# Patient Record
Sex: FEMALE | Race: BLACK OR AFRICAN AMERICAN | NOT HISPANIC OR LATINO | Employment: OTHER | URBAN - METROPOLITAN AREA
[De-identification: names, ages, dates, MRNs, and addresses within clinical notes are randomized per-mention and may not be internally consistent; named-entity substitution may affect disease eponyms.]

---

## 2017-03-18 ENCOUNTER — HOSPITAL ENCOUNTER (EMERGENCY)
Facility: HOSPITAL | Age: 72
Discharge: HOME | End: 2017-03-18
Attending: EMERGENCY MEDICINE
Payer: MEDICARE

## 2017-03-18 VITALS
HEIGHT: 69 IN | WEIGHT: 210 LBS | TEMPERATURE: 99 F | HEART RATE: 76 BPM | RESPIRATION RATE: 18 BRPM | OXYGEN SATURATION: 100 % | DIASTOLIC BLOOD PRESSURE: 90 MMHG | BODY MASS INDEX: 31.1 KG/M2 | SYSTOLIC BLOOD PRESSURE: 200 MMHG

## 2017-03-18 DIAGNOSIS — I10 UNCONTROLLED HYPERTENSION: ICD-10-CM

## 2017-03-18 DIAGNOSIS — S20.211A CHEST WALL CONTUSION, RIGHT, INITIAL ENCOUNTER: ICD-10-CM
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
## 2017-03-18 LAB
ALBUMIN SERPL BCP-MCNC: 3.6 G/DL
ALP SERPL-CCNC: 105 U/L
ALT SERPL W/O P-5'-P-CCNC: 8 U/L
ANION GAP SERPL CALC-SCNC: 8 MMOL/L
AST SERPL-CCNC: 11 U/L
BASOPHILS # BLD AUTO: 0.01 K/UL
BASOPHILS NFR BLD: 0.1 %
BILIRUB SERPL-MCNC: 0.2 MG/DL
BNP SERPL-MCNC: 12 PG/ML
BUN SERPL-MCNC: 15 MG/DL
CALCIUM SERPL-MCNC: 9.5 MG/DL
CHLORIDE SERPL-SCNC: 102 MMOL/L
CO2 SERPL-SCNC: 29 MMOL/L
CREAT SERPL-MCNC: 1.1 MG/DL
DIFFERENTIAL METHOD: ABNORMAL
EOSINOPHIL # BLD AUTO: 0.1 K/UL
EOSINOPHIL NFR BLD: 0.8 %
ERYTHROCYTE [DISTWIDTH] IN BLOOD BY AUTOMATED COUNT: 15.4 %
EST. GFR  (AFRICAN AMERICAN): 59 ML/MIN/1.73 M^2
EST. GFR  (NON AFRICAN AMERICAN): 51 ML/MIN/1.73 M^2
GLUCOSE SERPL-MCNC: 329 MG/DL
HCT VFR BLD AUTO: 34.6 %
HGB BLD-MCNC: 10.7 G/DL
INR PPP: 1
LYMPHOCYTES # BLD AUTO: 1.7 K/UL
LYMPHOCYTES NFR BLD: 20 %
MCH RBC QN AUTO: 23.2 PG
MCHC RBC AUTO-ENTMCNC: 30.9 %
MCV RBC AUTO: 75 FL
MONOCYTES # BLD AUTO: 0.4 K/UL
MONOCYTES NFR BLD: 4.3 %
NEUTROPHILS # BLD AUTO: 6.3 K/UL
NEUTROPHILS NFR BLD: 74.8 %
PLATELET # BLD AUTO: 256 K/UL
PMV BLD AUTO: 10.3 FL
POTASSIUM SERPL-SCNC: 4 MMOL/L
PROT SERPL-MCNC: 7.7 G/DL
PROTHROMBIN TIME: 10.5 SEC
RBC # BLD AUTO: 4.61 M/UL
SODIUM SERPL-SCNC: 139 MMOL/L
TROPONIN I SERPL DL<=0.01 NG/ML-MCNC: <0.006 NG/ML
WBC # BLD AUTO: 8.47 K/UL

## 2017-03-18 PROCEDURE — 99284 EMERGENCY DEPT VISIT MOD MDM: CPT

## 2017-03-18 PROCEDURE — 80053 COMPREHEN METABOLIC PANEL: CPT

## 2017-03-18 PROCEDURE — 84484 ASSAY OF TROPONIN QUANT: CPT

## 2017-03-18 PROCEDURE — 25000003 PHARM REV CODE 250: Performed by: EMERGENCY MEDICINE

## 2017-03-18 PROCEDURE — 85025 COMPLETE CBC W/AUTO DIFF WBC: CPT

## 2017-03-18 PROCEDURE — 83880 ASSAY OF NATRIURETIC PEPTIDE: CPT

## 2017-03-18 PROCEDURE — 93005 ELECTROCARDIOGRAM TRACING: CPT

## 2017-03-18 PROCEDURE — 85610 PROTHROMBIN TIME: CPT

## 2017-03-18 RX ORDER — TRAMADOL HYDROCHLORIDE 50 MG/1
50 TABLET ORAL
Status: COMPLETED | OUTPATIENT
Start: 2017-03-18 | End: 2017-03-18

## 2017-03-18 RX ORDER — LOVASTATIN 20 MG/1
20 TABLET ORAL NIGHTLY
Qty: 30 TABLET | Refills: 2 | Status: SHIPPED | OUTPATIENT
Start: 2017-03-18 | End: 2019-09-10 | Stop reason: CLARIF

## 2017-03-18 RX ORDER — LISINOPRIL 10 MG/1
10 TABLET ORAL DAILY
Qty: 30 TABLET | Refills: 2 | Status: SHIPPED | OUTPATIENT
Start: 2017-03-18 | End: 2019-09-10 | Stop reason: DRUGHIGH

## 2017-03-18 RX ORDER — LISINOPRIL 5 MG/1
10 TABLET ORAL
Status: COMPLETED | OUTPATIENT
Start: 2017-03-18 | End: 2017-03-18

## 2017-03-18 RX ORDER — TRAMADOL HYDROCHLORIDE 50 MG/1
50 TABLET ORAL EVERY 8 HOURS PRN
Qty: 20 TABLET | Refills: 0 | Status: SHIPPED | OUTPATIENT
Start: 2017-03-18 | End: 2017-03-21

## 2017-03-18 RX ORDER — ASPIRIN 81 MG/1
81 TABLET ORAL DAILY
Qty: 90 TABLET | Refills: 3 | Status: SHIPPED | OUTPATIENT
Start: 2017-03-18

## 2017-03-18 RX ORDER — CLONIDINE HYDROCHLORIDE 0.1 MG/1
0.3 TABLET ORAL
Status: COMPLETED | OUTPATIENT
Start: 2017-03-18 | End: 2017-03-18

## 2017-03-18 RX ORDER — GLIPIZIDE 5 MG/1
5 TABLET, FILM COATED, EXTENDED RELEASE ORAL
Qty: 30 TABLET | Refills: 2 | Status: SHIPPED | OUTPATIENT
Start: 2017-03-18 | End: 2017-03-21

## 2017-03-18 RX ADMIN — TRAMADOL HYDROCHLORIDE 50 MG: 50 TABLET, FILM COATED ORAL at 11:03

## 2017-03-18 RX ADMIN — CLONIDINE HYDROCHLORIDE 0.3 MG: 0.1 TABLET ORAL at 10:03

## 2017-03-18 RX ADMIN — LISINOPRIL 10 MG: 5 TABLET ORAL at 10:03

## 2017-03-18 NOTE — ED AVS SNAPSHOT
[facility]  [address] [place] [address]               Mariangel Moreau   3/18/2017  9:55 PM   ED    Description:  Female : 1945   Department:  [facility]           Your Care was Coordinated By:     Provider Role From To    Judson Nelson MD Attending Provider 17 [phone] --      Reason for Visit     Chest Pain           Diagnoses this Visit        Comments    Fall, initial encounter    -  Primary     Chest wall contusion, right, initial encounter         Uncontrolled hypertension           ED Disposition     ED Disposition Condition Comment    Discharge             To Do List           Follow-up Information     Schedule an appointment as soon as possible for a visit with Cassandra Raman NP.    Specialty:  Family Medicine    Contact information:    [address] [facility] [address]  [phone]         These Medications        Disp Refills Start End    aspirin (ECOTRIN) 81 MG EC tablet 90 tablet 3 3/18/2017     Take 1 tablet (81 mg total) by mouth once daily. - Oral    glipiZIDE (GLUCOTROL) 5 MG TR24 30 tablet 2 3/18/2017     Take 1 tablet (5 mg total) by mouth daily with breakfast. - Oral    lisinopril 10 MG tablet 30 tablet 2 3/18/2017     Take 1 tablet (10 mg total) by mouth once daily. - Oral    lovastatin (MEVACOR) 20 MG tablet 30 tablet 2 3/18/2017     Take 1 tablet (20 mg total) by mouth every evening. - Oral    tramadol (ULTRAM) 50 mg tablet 20 tablet 0 3/18/2017 3/21/2017    Take 1 tablet (50 mg total) by mouth every 8 (eight) hours as needed for Pain. - Oral      [facility]     [facility] Care [facility] -  Assistance  Registered nurses in the [facility] provide clinical advisement, health education, appointment booking, and other advisory services.  Call for this free service at [phone].             Medications           Message regarding Medications      Verify the changes and/or additions to your medication regime listed below are the same as discussed with your clinician today.  If any of these changes or additions are incorrect, please notify your healthcare provider.        START taking these NEW medications        Refills    tramadol (ULTRAM) 50 mg tablet 0    Sig: Take 1 tablet (50 mg total) by mouth every 8 (eight) hours as needed for Pain.    Class: Print    Route: Oral      These medications were administered today        Dose Freq    cloNIDine tablet 0.3 mg 0.3 mg ED 1 Time    Sig: Take 3 tablets (0.3 mg total) by mouth ED 1 Time.    Class: Normal    Route: Oral    lisinopril tablet 10 mg 10 mg ED 1 Time    Sig: Take 2 tablets (10 mg total) by mouth ED 1 Time.    Class: Normal    Route: Oral    tramadol tablet 50 mg 50 mg ED 1 Time    Sig: Take 1 tablet (50 mg total) by mouth ED 1 Time.    Class: Normal    Route: Oral      CHANGE how you are taking these medications     Start Taking Instead of    aspirin (ECOTRIN) 81 MG EC tablet aspirin (ECOTRIN) 81 MG EC tablet    Dosage:  Take 1 tablet (81 mg total) by mouth once daily. Dosage:  Take 81 mg by mouth once daily.    glipiZIDE (GLUCOTROL) 5 MG TR24 glipiZIDE (GLUCOTROL) 10 MG TR24    Dosage:  Take 1 tablet (5 mg total) by mouth daily with breakfast. Dosage:  Take 5 mg by mouth daily with breakfast.    lisinopril 10 MG tablet lisinopril 10 MG tablet    Dosage:  Take 1 tablet (10 mg total) by mouth once daily. Dosage:  Take 10 mg by mouth once daily.    lovastatin (MEVACOR) 20 MG tablet lovastatin (MEVACOR) 20 MG tablet    Dosage:  Take 1 tablet (20 mg total) by mouth every evening. Dosage:  Take 20 mg by mouth every evening.      STOP taking these medications     ondansetron (ZOFRAN-ODT) 8 MG TbDL Take 1 tablet (8 mg total) by mouth every 8 (eight) hours as needed (nausea).    omeprazole (PRILOSEC) 20 MG capsule Take 2 capsules (40 mg total) by mouth once daily.           Verify that the below list of  "medications is an accurate representation of the medications you are currently taking.  If none reported, the list may be blank. If incorrect, please contact your healthcare provider. Carry this list with you in case of emergency.           Current Medications     aspirin (ECOTRIN) 81 MG EC tablet Take 1 tablet (81 mg total) by mouth once daily.    calcium-vitamin D3 500 mg(1,250mg) -200 unit per tablet Take 1 tablet by mouth 2 (two) times daily with meals.    glipiZIDE (GLUCOTROL) 5 MG TR24 Take 1 tablet (5 mg total) by mouth daily with breakfast.    lisinopril 10 MG tablet Take 1 tablet (10 mg total) by mouth once daily.    lisinopril tablet 10 mg Take 2 tablets (10 mg total) by mouth ED 1 Time.    lovastatin (MEVACOR) 20 MG tablet Take 1 tablet (20 mg total) by mouth every evening.    multivitamin capsule Take 1 capsule by mouth once daily.    tramadol (ULTRAM) 50 mg tablet Take 1 tablet (50 mg total) by mouth every 8 (eight) hours as needed for Pain.    tramadol tablet 50 mg Take 1 tablet (50 mg total) by mouth ED 1 Time.           Clinical Reference Information           Your Vitals Were     BP Pulse Temp Resp Height Weight    209/94 74 99.4 °F (37.4 °C) (Oral) 20 5' 9" (1.753 m) 95.3 kg (210 lb)    SpO2 BMI             99% 31.01 kg/m2         Allergies as of 3/18/2017        Reactions    Darvocet A500 [Propoxyphene N-acetaminophen] Swelling    Penicillins Swelling    Percocet [Oxycodone-acetaminophen]       Immunizations Administered on Date of Encounter - 3/18/2017     None      ED Micro, Lab, POCT     Start Ordered       Status Ordering Provider    03/18/17 2026 03/18/17 2025  CBC auto differential  Once      Final result     03/18/17 2026 03/18/17 2025  Comprehensive metabolic panel  Once      Final result     03/18/17 2026 03/18/17 2025  Brain natriuretic peptide  Once      Final result     03/18/17 2026 03/18/17 2025  Troponin I  Once      Final result     03/18/17 2026 03/18/17 2025  Protime-INR  Once   "    Final result       ED Imaging Orders     Start Ordered       Status Ordering Provider    03/18/17 2026 03/18/17 2025  X-Ray Chest PA And Lateral  1 time imaging      Final result       Discharge References/Attachments     CHEST WALL CONTUSION (ENGLISH)    FALLS, PREVENTING, MAKING CHANGES IN YOUR LIVING SPACE (ENGLISH)    HIGH BLOOD PRESSURE, ESTABLISHED, OUT OF CONTROL (ENGLISH)      [facility] Sign-Up     Activating your [facility] account is as easy as 1-2-3!     1) Visit [email or web address], select Sign Up Now, enter this activation code and your date of birth, then select Next.  [email or web address]  Expires: 5/2/2017 10:49 PM      2) Create a username and password to use when you visit [facility] in the future and select a security question in case you lose your password and select Next.    3) Enter your e-mail address and click Sign Up!    Additional Information  If you have questions, please e-mail [email or web address] or call [phone] to talk to our [facility] staff. Remember, [facility] is NOT to be used for urgent needs. For medical emergencies, dial 911.          [facility] complies with applicable Federal civil rights laws and does not discriminate on the basis of race, color, national origin, age, disability, or sex.        Language Assistance Services     ATTENTION: Language assistance services are available, free of charge. Please call [phone].      ATENCIÓN: Si habla español, tiene a restrepo disposición servicios gratuitos de asistencia lingüística. Llame al [phone].     CHÚ Ý: N?u b?n nói Ti?ng Vi?t, có các d?ch v? h? tr? ngôn ng? mi?n phí dành cho b?n. G?i s? [phone].

## 2017-03-19 NOTE — ED PROVIDER NOTES
"Encounter Date: 3/18/2017    SCRIBE #1 NOTE: I, Sybil Bruce, am scribing for, and in the presence of,  Judson Nelson MD. I have scribed the following portions of the note - Other sections scribed: HPI, ROS.       History     Chief Complaint   Patient presents with    Chest Pain     "I've been having cp for 3 days now and I have tingling in my right shoulder and I can feel it in my wrist also. Right-sided cp     Review of patient's allergies indicates:   Allergen Reactions    Darvocet a500 [propoxyphene n-acetaminophen] Swelling    Penicillins Swelling    Percocet [oxycodone-acetaminophen]      HPI Comments: CC: Chest  Pain    HPI: 71 y.o. F with HTN and DM presents to the ED c/o R chest wall pain and R arm and shoulder pain s/p fall 2 days ago. Symptoms are acute onset and severe (8/10). Pt states symptoms began after slip and fall in the shower landing on her R side. No LOC. No head trauma. Pt denies fever, chills, neck pain, vision changes, abdominal pain, N/V, hematuria, flank pain, dizziness, and any other associated symptoms. No alleviating factors. No prior treatment. Of note, pt states she is noncompliant with her daily medications and states she forgets to take them.    The history is provided by the patient. No  was used.     Past Medical History:   Diagnosis Date    Diabetes mellitus     Hypertension     Sarcoidosis      Past Surgical History:   Procedure Laterality Date    CHOLECYSTECTOMY       History reviewed. No pertinent family history.  Social History   Substance Use Topics    Smoking status: Never Smoker    Smokeless tobacco: None    Alcohol use No     Review of Systems   Constitutional: Negative for chills, diaphoresis and fever.   HENT: Negative for ear pain and sore throat.    Eyes: Negative for photophobia and visual disturbance.   Respiratory: Negative for cough and shortness of breath.    Cardiovascular: Positive for chest pain (R chest wall). "   Gastrointestinal: Negative for abdominal pain, diarrhea, nausea and vomiting.   Genitourinary: Negative for dysuria, flank pain and hematuria.   Musculoskeletal: Negative for back pain and neck pain.        (+) R arm and shoulder pain   Skin: Negative for rash.   Neurological: Negative for dizziness and headaches.        (-) LOC       Physical Exam   Initial Vitals   BP Pulse Resp Temp SpO2   03/18/17 2024 03/18/17 2024 03/18/17 2024 03/18/17 2024 03/18/17 2024   224/93 73 20 99.4 °F (37.4 °C) 99 %     Physical Exam  Nursing note and vitals reviewed.  Constitutional:   HENT:    Head: NC/AT    Eyes: Conjunctivae normal.   (-) scleral icterus.              Mouth/Throat: MMM.     Neck: Neck supple, normal rom.   Cardiovascular: RRR  Pulmonary/Chest: CTAB   Abdominal: Soft. ND/NT w/o guarding or rebound.  (-) CVA tenderness.  Musculoskeletal: FROM of all major joints. No extremity edema or tenderness.  Neurological: A&Ox4, Normal speech.  No focal motor deficits.  Normal gait  Skin: Skin is warm and dry.   Psychiatric: normal mood and affect.      ED Course   Procedures  Labs Reviewed   CBC W/ AUTO DIFFERENTIAL - Abnormal; Notable for the following:        Result Value    Hemoglobin 10.7 (*)     Hematocrit 34.6 (*)     MCV 75 (*)     MCH 23.2 (*)     MCHC 30.9 (*)     RDW 15.4 (*)     Gran% 74.8 (*)     All other components within normal limits   COMPREHENSIVE METABOLIC PANEL - Abnormal; Notable for the following:     Glucose 329 (*)     ALT 8 (*)     eGFR if  59 (*)     eGFR if non  51 (*)     All other components within normal limits   B-TYPE NATRIURETIC PEPTIDE   TROPONIN I   PROTIME-INR     EKG Readings: (Independently Interpreted)   Initial Reading: No STEMI.   Normal sinus rhythm, rate 77, normal axis/intervals, no ST/T-wave abnormalities.       X-Rays:   Independently Interpreted Readings:   Chest X-Ray: Normal heart size.  No infiltrates.  No acute abnormalities.  No acute  displaced rib fracture, pneumothorax or hemothorax.     Medical Decision Making:   History:   I obtained history from: someone other than patient.  Old Medical Records: I decided to obtain old medical records.  Old Records Summarized: records from clinic visits and other records.  Independently Interpreted Test(s):   I have ordered and independently interpreted X-rays - see prior notes.  I have ordered and independently interpreted EKG Reading(s) - see prior notes  Clinical Tests:   Lab Tests: Ordered and Reviewed  Radiological Study: Ordered and Reviewed  Medical Tests: Ordered and Reviewed    Initial differential diagnosis includes but is not limited to... ACS, pneumothorax, PE, pneumonia, aortic dissection, chest wall/rib contusion versus fracture.     Additional Medical Decision Makin-year-old female with history of hypertension and diabetes presents the emergency department by her  complaining of right shoulder/arm/right sided chest pain for the past 2 days status post slip and fall in the shower - see hpi for additional details.  On exam, she has reproducible nonspecific right anterior chest ttp; Lugs CTA.  Aside from HTN, VS reassuring.  Basic labs including troponin and BNP wnls and without evidence of end organ damage.  Her EKG is without evidence of acute ischemia and/or dysrhythmia.  She's been given her home antihypertensives including clonidine prior to being discharged home.  Recommend supportive care including Ultram as needed.  All medications have been refilled per patient request.  She has been strongly advised to follow-up with her primary care physician within 5-7 days for reevaluation further management including repeat blood pressure check.            Scribe Attestation:   Scribe #1: I performed the above scribed service and the documentation accurately describes the services I performed. I attest to the accuracy of the note.    Attending Attestation:           Physician  Attestation for Scribe:  Physician Attestation Statement for Scribe #1: I, Judson Nelson MD, reviewed documentation, as scribed by Sybil Bruce in my presence, and it is both accurate and complete.                 ED Course     Clinical Impression:   The primary encounter diagnosis was Fall, initial encounter. Diagnoses of Chest wall contusion, right, initial encounter and Uncontrolled hypertension were also pertinent to this visit.    Disposition:   Disposition: Discharged       Judson Nelson MD  03/18/17 0363

## 2017-03-19 NOTE — ED TRIAGE NOTES
"Pt presents to ED with c/o right-sided cp. Pt states, "I've been having cp for 3 days now and I have tingling in my right shoulder and I can feel it in my wrist also." Rates pain 10/10.   Past Medical History:   Diagnosis Date    Diabetes mellitus     Hypertension     Sarcoidosis      Past Surgical History:   Procedure Laterality Date    CHOLECYSTECTOMY        Vitals:    03/18/17 2024   BP: (!) 224/93   BP Location: Right arm   Patient Position: Sitting   Pulse: 73   Resp: 20   Temp: 99.4 °F (37.4 °C)   TempSrc: Oral   SpO2: 99%   Weight: 95.3 kg (210 lb)   Height: 5' 9" (1.753 m)     "

## 2017-03-21 ENCOUNTER — HOSPITAL ENCOUNTER (EMERGENCY)
Facility: HOSPITAL | Age: 72
Discharge: HOME | End: 2017-03-21
Attending: EMERGENCY MEDICINE
Payer: MEDICARE

## 2017-03-21 VITALS
WEIGHT: 210 LBS | DIASTOLIC BLOOD PRESSURE: 81 MMHG | HEART RATE: 80 BPM | HEIGHT: 69 IN | TEMPERATURE: 98 F | SYSTOLIC BLOOD PRESSURE: 198 MMHG | OXYGEN SATURATION: 96 % | RESPIRATION RATE: 18 BRPM | BODY MASS INDEX: 31.1 KG/M2

## 2017-03-21 DIAGNOSIS — R07.9 CHEST PAIN, UNSPECIFIED TYPE: Primary | ICD-10-CM

## 2017-03-21 LAB
ALBUMIN SERPL BCP-MCNC: 3.4 G/DL
ALP SERPL-CCNC: 101 U/L
ALT SERPL W/O P-5'-P-CCNC: 10 U/L
ANION GAP SERPL CALC-SCNC: 9 MMOL/L
AST SERPL-CCNC: 12 U/L
BASOPHILS # BLD AUTO: 0.01 K/UL
BASOPHILS NFR BLD: 0.1 %
BILIRUB SERPL-MCNC: 0.4 MG/DL
BNP SERPL-MCNC: 21 PG/ML
BUN SERPL-MCNC: 14 MG/DL
CALCIUM SERPL-MCNC: 9.7 MG/DL
CHLORIDE SERPL-SCNC: 95 MMOL/L
CO2 SERPL-SCNC: 31 MMOL/L
CREAT SERPL-MCNC: 1 MG/DL
DIFFERENTIAL METHOD: ABNORMAL
EOSINOPHIL # BLD AUTO: 0 K/UL
EOSINOPHIL NFR BLD: 0.2 %
ERYTHROCYTE [DISTWIDTH] IN BLOOD BY AUTOMATED COUNT: 15.3 %
EST. GFR  (AFRICAN AMERICAN): >60 ML/MIN/1.73 M^2
EST. GFR  (NON AFRICAN AMERICAN): 58 ML/MIN/1.73 M^2
GLUCOSE SERPL-MCNC: 341 MG/DL
HCT VFR BLD AUTO: 34 %
HGB BLD-MCNC: 10.7 G/DL
INR PPP: 1
LYMPHOCYTES # BLD AUTO: 1.2 K/UL
LYMPHOCYTES NFR BLD: 13.3 %
MCH RBC QN AUTO: 22.6 PG
MCHC RBC AUTO-ENTMCNC: 31.5 %
MCV RBC AUTO: 72 FL
MONOCYTES # BLD AUTO: 0.8 K/UL
MONOCYTES NFR BLD: 8.5 %
NEUTROPHILS # BLD AUTO: 7 K/UL
NEUTROPHILS NFR BLD: 77.7 %
PLATELET # BLD AUTO: 269 K/UL
PMV BLD AUTO: 10.6 FL
POTASSIUM SERPL-SCNC: 4.1 MMOL/L
PROT SERPL-MCNC: 8.1 G/DL
PROTHROMBIN TIME: 10.9 SEC
RBC # BLD AUTO: 4.74 M/UL
SODIUM SERPL-SCNC: 135 MMOL/L
TROPONIN I SERPL DL<=0.01 NG/ML-MCNC: <0.006 NG/ML
WBC # BLD AUTO: 8.96 K/UL

## 2017-03-21 PROCEDURE — 99284 EMERGENCY DEPT VISIT MOD MDM: CPT | Mod: 25

## 2017-03-21 PROCEDURE — 85025 COMPLETE CBC W/AUTO DIFF WBC: CPT

## 2017-03-21 PROCEDURE — 25500020 PHARM REV CODE 255: Performed by: EMERGENCY MEDICINE

## 2017-03-21 PROCEDURE — 96360 HYDRATION IV INFUSION INIT: CPT

## 2017-03-21 PROCEDURE — 80053 COMPREHEN METABOLIC PANEL: CPT

## 2017-03-21 PROCEDURE — 83880 ASSAY OF NATRIURETIC PEPTIDE: CPT

## 2017-03-21 PROCEDURE — 25000003 PHARM REV CODE 250: Performed by: EMERGENCY MEDICINE

## 2017-03-21 PROCEDURE — 85610 PROTHROMBIN TIME: CPT

## 2017-03-21 PROCEDURE — 93005 ELECTROCARDIOGRAM TRACING: CPT

## 2017-03-21 PROCEDURE — 84484 ASSAY OF TROPONIN QUANT: CPT

## 2017-03-21 RX ORDER — GLIPIZIDE 10 MG/1
10 TABLET ORAL
COMMUNITY
End: 2019-09-10 | Stop reason: SDUPTHER

## 2017-03-21 RX ORDER — SODIUM CHLORIDE 9 MG/ML
500 INJECTION, SOLUTION INTRAVENOUS
Status: COMPLETED | OUTPATIENT
Start: 2017-03-21 | End: 2017-03-21

## 2017-03-21 RX ORDER — METAXALONE 800 MG/1
400 TABLET ORAL 3 TIMES DAILY PRN
Qty: 15 TABLET | Refills: 0 | Status: SHIPPED | OUTPATIENT
Start: 2017-03-21 | End: 2017-03-26

## 2017-03-21 RX ORDER — NITROGLYCERIN 0.4 MG/1
0.4 TABLET SUBLINGUAL EVERY 5 MIN PRN
Status: COMPLETED | OUTPATIENT
Start: 2017-03-21 | End: 2017-03-21

## 2017-03-21 RX ORDER — METAXALONE 800 MG/1
800 TABLET ORAL
Status: COMPLETED | OUTPATIENT
Start: 2017-03-21 | End: 2017-03-21

## 2017-03-21 RX ADMIN — METAXALONE 800 MG: 800 TABLET ORAL at 10:03

## 2017-03-21 RX ADMIN — IOHEXOL 80 ML: 350 INJECTION, SOLUTION INTRAVENOUS at 09:03

## 2017-03-21 RX ADMIN — NITROGLYCERIN 0.4 MG: 0.4 TABLET SUBLINGUAL at 09:03

## 2017-03-21 RX ADMIN — NITROGLYCERIN 0.4 MG: 0.4 TABLET SUBLINGUAL at 08:03

## 2017-03-21 RX ADMIN — SODIUM CHLORIDE 500 ML: 0.9 INJECTION, SOLUTION INTRAVENOUS at 10:03

## 2017-03-21 NOTE — ED AVS SNAPSHOT
[facility]  [address] [place] [address]               Mariangel Moreau   3/21/2017  7:49 PM   ED    Description:  Female : 1945   Department:  [facility]           Your Care was Coordinated By:     Provider Role From To    Chepe Colon MD Attending Provider 17 --      Reason for Visit     Chest Pain           Diagnoses this Visit        Comments    Chest pain, unspecified type    -  Primary       ED Disposition     ED Disposition Condition Comment    Discharge  Our goal in the emergency department is to always give you outstanding care and exceptional service. You may receive a survey by mail or e-mail in the next week regarding your experience in our ED. We would greatly appreciate your completing and returnin g the survey. Your feedback provides us with a way to recognize our staff who give very good care and it helps us learn how to improve when your experience was below our aspiration of excellence.              To Do List           Follow-up Information     Follow up with Cassandra Raman NP.    Specialty:  Family Medicine    Why:  For repeat evaluation    Contact information:    [address] [facility] [id number] [phone]          Follow up with [facility].    Specialty:  Emergency Medicine    Why:  If symptoms worsen    Contact information:    [address] [place] [address] [phone]        Follow up with Gustavo Camp MD.    Specialty:  Cardiology    Why:  FOR SPECIALTY EVALUATION    Contact information:    [address] [facility] [address] [phone]         These Medications        Disp Refills Start End    metaxalone (SKELAXIN) 800 MG tablet 15 tablet 0 3/21/2017 3/26/2017    Take 0.5 tablets (400 mg total) by mouth 3 (three) times daily as needed for Pain. - Oral      [facility]     [facility] Care [facility] -  24/7 Assistance  Registered nurses in the [facility] provide clinical advisement, health education, appointment booking, and other advisory services.  Call for this free service at [phone].             Medications           Message regarding Medications     Verify the changes and/or additions to your medication regime listed below are the same as discussed with your clinician today.  If any of these changes or additions are incorrect, please notify your healthcare provider.        START taking these NEW medications        Refills    metaxalone (SKELAXIN) 800 MG tablet 0    Sig: Take 0.5 tablets (400 mg total) by mouth 3 (three) times daily as needed for Pain.    Class: Print    Route: Oral      These medications were administered today        Dose Freq    nitroGLYCERIN SL tablet 0.4 mg 0.4 mg Every 5 min PRN    Sig: Place 1 tablet (0.4 mg total) under the tongue every 5 (five) minutes as needed for Chest pain.    Class: Normal    Route: Sublingual    omnipaque 350 iohexol 80 mL 80 mL IMG once as needed    Sig: Inject 80 mLs into the vein ONCE PRN for contrast.    Class: Normal    Route: Intravenous    metaxalone tablet 800 mg 800 mg ED 1 Time    Sig: Take 1 tablet (800 mg total) by mouth ED 1 Time.    Class: Normal    Route: Oral    0.9%  NaCl infusion 500 mL ED 1 Time    Sig: Inject 500 mLs into the vein ED 1 Time.    Class: Normal    Route: Intravenous      STOP taking these medications     glipiZIDE (GLUCOTROL) 5 MG TR24 Take 1 tablet (5 mg total) by mouth daily with breakfast.           Verify that the below list of medications is an accurate representation of the medications you are currently taking.  If none reported, the list may be blank. If incorrect, please contact your healthcare provider. Carry this list with you in case of emergency.           Current Medications     aspirin (ECOTRIN) 81 MG EC tablet Take 1 tablet (81 mg total) by mouth once daily.    glipiZIDE (GLUCOTROL) 10 MG  "tablet Take 10 mg by mouth 2 (two) times daily before meals.    lisinopril 10 MG tablet Take 1 tablet (10 mg total) by mouth once daily.    lovastatin (MEVACOR) 20 MG tablet Take 1 tablet (20 mg total) by mouth every evening.    tramadol (ULTRAM) 50 mg tablet Take 1 tablet (50 mg total) by mouth every 8 (eight) hours as needed for Pain.    calcium-vitamin D3 500 mg(1,250mg) -200 unit per tablet Take 1 tablet by mouth 2 (two) times daily with meals.    metaxalone (SKELAXIN) 800 MG tablet Take 0.5 tablets (400 mg total) by mouth 3 (three) times daily as needed for Pain.    multivitamin capsule Take 1 capsule by mouth once daily.           Clinical Reference Information           Your Vitals Were     BP Pulse Temp Resp Height Weight    150/65 (BP Location: Left arm, Patient Position: Sitting, BP Method: Automatic) 96 98.3 °F (36.8 °C) (Oral) 18 5' 9" (1.753 m) 95.3 kg (210 lb)    SpO2 BMI             97% 31.01 kg/m2         Allergies as of 3/21/2017        Reactions    Darvocet A500 [Propoxyphene N-acetaminophen] Swelling    Penicillins Swelling    Percocet [Oxycodone-acetaminophen]       Immunizations Administered on Date of Encounter - 3/21/2017     None      ED Micro, Lab, POCT     Start Ordered       Status Ordering Provider    03/21/17 1949 03/21/17 1948  CBC auto differential  Once      Final result     03/21/17 1949 03/21/17 1948  Comprehensive metabolic panel  Once      Final result     03/21/17 1949 03/21/17 1948  Brain natriuretic peptide  Once      Final result     03/21/17 1949 03/21/17 1948  Troponin I  Once      Final result     03/21/17 1949 03/21/17 1948  Protime-INR  Once      Final result       ED Imaging Orders     Start Ordered       Status Ordering Provider    03/21/17 2023 03/21/17 2023  CTA Chest Non-Coronary  1 time imaging      Final result     03/21/17 1949 03/21/17 1948  X-Ray Chest PA And Lateral  1 time imaging      Final result         Discharge Instructions           Uncertain Causes of " Chest Pain    Chest pain can happen for a number of reasons. Sometimes the cause can't be determined. If your condition does not seem serious, and your pain does not appear to be coming from your heart, your healthcare provider may recommend watching it closely. Sometimes the signs of a serious problem take more time to appear. Many problems not related to your heart can cause chest pain.These include:  · Musculoskeletal. Costochondritis, an inflammation of the tissues around the ribs that can occur from trauma or overuse injuries  · Respiratory. Pneumonia, pneumothorax, or pneumonitis (inflammation of the lining of the chest and lungs)  · Gastrointestinal. Esophageal reflux, heartburn, or gallbladder disease  · Anxiety and panic disorders  · Nerve compression and neuritis  · Miscellaneous problems such as aortic aneurysm or pulmonary embolism (a blood clot in the lungs)  Home care  After your visit, follow these recommendations:  · Rest today and avoid strenuous activity.  · Take any prescribed medicine as directed.  · Be aware of any recurrent chest pain and notice any changes  Follow-up care  Follow up with your healthcare provider if you do not start to feel better within 24 hours, or as advised.  Call 911  Call 911 if any of these occur:  · A change in the type of pain: if it feels different, becomes more severe, lasts longer, or begins to spread into your shoulder, arm, neck, jaw or back  · Shortness of breath or increased pain with breathing  · Weakness, dizziness, or fainting  · Rapid heart beat  · Crushing sensation in your chest  When to seek medical advice  Call your healthcare provider right away if any of the following occur:  · Cough with dark colored sputum (phlegm) or blood  · Fever of 100.4ºF (38ºC) or higher, or as directed by your healthcare provider  · Swelling, pain or redness in one leg  · Shortness of breath  Date Last Reviewed: 12/30/2015  © 3309-5065 [employer]. [address] [place] [address]  [place] [address]. All rights reserved. This information is not intended as a substitute for professional medical care. Always follow your healthcare professional's instructions.                [facility] Sign-Up     Activating your [facility] account is as easy as 1-2-3!     1) Visit [email or web address], select Sign Up Now, enter this activation code and your date of birth, then select Next.  [email or web address]  Expires: 5/2/2017 10:49 PM      2) Create a username and password to use when you visit [facility] in the future and select a security question in case you lose your password and select Next.    3) Enter your e-mail address and click Sign Up!    Additional Information  If you have questions, please e-mail [email or web address] or call [phone] to talk to our [facility] staff. Remember, [facility] is NOT to be used for urgent needs. For medical emergencies, dial 911.          [facility] complies with applicable Federal civil rights laws and does not discriminate on the basis of race, color, national origin, age, disability, or sex.        Language Assistance Services     ATTENTION: Language assistance services are available, free of charge. Please call [phone].      ATENCIÓN: Si habla español, tiene a restrepo disposición servicios gratuitos de asistencia lingüística. Llame al [phone].     CHÚ Ý: N?u b?n nói Ti?ng Vi?t, có các d?ch v? h? tr? ngôn ng? mi?n phí dành cho b?n. G?i s? [phone].

## 2017-03-22 NOTE — ED NOTES
Pt informed that she needs to speak with her primary doctor for glipizide prescription. Pt verbalized understanding.

## 2017-03-22 NOTE — ED NOTES
Pt states she needs prescription refill for glipizide. Pt states she was wrote a script for 5mg when she takes 10mg twice a day.

## 2017-03-22 NOTE — ED TRIAGE NOTES
Pt presents to ER due to chest pain which began last Saturday. Pt states she was seen in the ER and was told she had a contusion. Pt states that she has been taking tramadol and aleve with no relief. Pt states that the pain is intermittent and radiates midsternal to her back.

## 2017-03-22 NOTE — DISCHARGE INSTRUCTIONS

## 2017-03-22 NOTE — ED NOTES
"  Pt states that she has side effects from flexeril states "it makes me very loopy.. I can't handle it."  "

## 2017-03-22 NOTE — ED PROVIDER NOTES
Encounter Date: 3/21/2017    SCRIBE #1 NOTE: I, Seth Martino, am scribing for, and in the presence of,  Chepe Colon MD. I have scribed the following portions of the note - Other sections scribed: HPI/ROS/PE/EKG.       History     Chief Complaint   Patient presents with    Chest Pain     Right side chest pain that radaites to the back since Saturday. Pain 10/10     Review of patient's allergies indicates:   Allergen Reactions    Darvocet a500 [propoxyphene n-acetaminophen] Swelling    Penicillins Swelling    Percocet [oxycodone-acetaminophen]      HPI Comments: CC: Chest Pain    HPI: This 71 y.o. Female with HTN, DM, and sarcoidosis presents to the ED c/o severe (pain rating 10/10), sharp, intermittent, right sided chest pain that radiates to the back since Saturday. The chest pain is exacerbated by movement. The pt stated that the pain occurs every few minutes. The pt does not have any hx of heart problems. The pt took Aleve but it did not help alleviate her symptoms. The pt denies SOB, cough, fever, vomiting, rhinorrhea, sore throat, or leg swelling.   No recent travel, immobility, clots, hemoptysis, or hormone therapy.      The history is provided by the patient and a relative. No  was used.     Past Medical History:   Diagnosis Date    Diabetes mellitus     Hypertension     Sarcoidosis      Past Surgical History:   Procedure Laterality Date    CHOLECYSTECTOMY       No family history on file.  Social History   Substance Use Topics    Smoking status: Never Smoker    Smokeless tobacco: Not on file    Alcohol use No     Review of Systems   Constitutional: Negative for fever.   HENT: Negative for rhinorrhea and sore throat.    Respiratory: Negative for cough and shortness of breath.    Cardiovascular: Positive for chest pain. Negative for leg swelling.   Gastrointestinal: Negative for nausea and vomiting.   Genitourinary: Negative for dysuria.   Musculoskeletal: Positive for back pain.    Skin: Negative for rash.   Neurological: Negative for weakness.   Hematological: Does not bruise/bleed easily.       Physical Exam   Initial Vitals   BP Pulse Resp Temp SpO2   03/21/17 1946 03/21/17 1946 03/21/17 1946 03/21/17 1946 03/21/17 1946   183/76 76 19 98.9 °F (37.2 °C) 97 %     Physical Exam    Nursing note and vitals reviewed.  Constitutional: She appears well-developed and well-nourished.  Non-toxic appearance. She does not appear ill.   HENT:   Head: Normocephalic and atraumatic.   Eyes: EOM are normal.   Neck: Neck supple.   Cardiovascular: Normal rate and regular rhythm.   Pulmonary/Chest: Effort normal and breath sounds normal. No respiratory distress. Right breast exhibits tenderness (mild).   Abdominal: Soft. Normal appearance and bowel sounds are normal. She exhibits no distension. There is no tenderness.   Musculoskeletal: Normal range of motion.   Neurological: She is alert.   Skin: Skin is warm and dry.   Psychiatric: She has a normal mood and affect.         ED Course   Procedures  Labs Reviewed   CBC W/ AUTO DIFFERENTIAL - Abnormal; Notable for the following:        Result Value    Hemoglobin 10.7 (*)     Hematocrit 34.0 (*)     MCV 72 (*)     MCH 22.6 (*)     MCHC 31.5 (*)     RDW 15.3 (*)     Gran% 77.7 (*)     Lymph% 13.3 (*)     All other components within normal limits   COMPREHENSIVE METABOLIC PANEL - Abnormal; Notable for the following:     Sodium 135 (*)     CO2 31 (*)     Glucose 341 (*)     Albumin 3.4 (*)     eGFR if non  58 (*)     All other components within normal limits   B-TYPE NATRIURETIC PEPTIDE   TROPONIN I   PROTIME-INR     EKG Readings: (Independently Interpreted)   Initial Reading: No STEMI. Rhythm: Normal Sinus Rhythm. Heart Rate: 105. ST Segments: Normal ST Segments. T Waves: Normal.          Medical Decision Making:   History:   Old Medical Records: I decided to obtain old medical records.  Clinical Tests:   Lab Tests: Ordered and  Reviewed  Radiological Study: Reviewed and Ordered  Medical Tests: Ordered and Reviewed  ED Management:  This was an emergent evaluation.  The patient is complaining of chest pain for the past 4 days.  The vital signs are stable in the room except for HTN  EKG is normal.  There is no evidence of STEMI or ischemia.    CXR is negative for pneumonia, pneumothorax and edema.  Troponin is negative.  I doubt ACS.  BNP is negative.  There is no evidence of congestive heart failure.  The electrolytes are relatively normal.  The pt is not anemic.   I doubt PE.    The pt's symptoms were treated with:  NTG without relief, then with skelaxin.    The pt will be discharged in stable condition with a prescription for skelaxin.  She will be referred to cardiology for further workup.            Scribe Attestation:   Scribe #1: I performed the above scribed service and the documentation accurately describes the services I performed. I attest to the accuracy of the note.    Attending Attestation:           Physician Attestation for Scribe:  Physician Attestation Statement for Scribe #1: I, Chepe Colon MD, reviewed documentation, as scribed by Seth Martino in my presence, and it is both accurate and complete.                 ED Course     Clinical Impression:   The encounter diagnosis was Chest pain, unspecified type.    Disposition:   Disposition: Discharged  Condition: Stable       Chepe Colon MD  03/22/17 0258

## 2017-03-28 ENCOUNTER — OFFICE VISIT (OUTPATIENT)
Dept: CARDIOLOGY | Facility: CLINIC | Age: 72
End: 2017-03-28
Payer: MEDICARE

## 2017-03-28 VITALS
WEIGHT: 188 LBS | DIASTOLIC BLOOD PRESSURE: 84 MMHG | HEIGHT: 70 IN | SYSTOLIC BLOOD PRESSURE: 189 MMHG | OXYGEN SATURATION: 100 % | HEART RATE: 69 BPM | BODY MASS INDEX: 26.92 KG/M2

## 2017-03-28 DIAGNOSIS — E11.8 TYPE 2 DIABETES MELLITUS WITH COMPLICATION, WITHOUT LONG-TERM CURRENT USE OF INSULIN: ICD-10-CM

## 2017-03-28 DIAGNOSIS — E78.00 PURE HYPERCHOLESTEROLEMIA: ICD-10-CM
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
## 2017-03-28 DIAGNOSIS — R07.9 CHEST PAIN, UNSPECIFIED TYPE: Primary | ICD-10-CM

## 2017-03-28 DIAGNOSIS — D86.9 SARCOIDOSIS: ICD-10-CM

## 2017-03-28 PROCEDURE — 1160F RVW MEDS BY RX/DR IN RCRD: CPT | Mod: S$GLB,,, | Performed by: INTERNAL MEDICINE

## 2017-03-28 PROCEDURE — 99204 OFFICE O/P NEW MOD 45 MIN: CPT | Mod: S$GLB,,, | Performed by: INTERNAL MEDICINE

## 2017-03-28 PROCEDURE — 4010F ACE/ARB THERAPY RXD/TAKEN: CPT | Mod: S$GLB,,, | Performed by: INTERNAL MEDICINE

## 2017-03-28 PROCEDURE — 2022F DILAT RTA XM EVC RTNOPTHY: CPT | Mod: S$GLB,,, | Performed by: INTERNAL MEDICINE

## 2017-03-28 PROCEDURE — 3079F DIAST BP 80-89 MM HG: CPT | Mod: S$GLB,,, | Performed by: INTERNAL MEDICINE

## 2017-03-28 PROCEDURE — 1159F MED LIST DOCD IN RCRD: CPT | Mod: S$GLB,,, | Performed by: INTERNAL MEDICINE

## 2017-03-28 PROCEDURE — 1157F ADVNC CARE PLAN IN RCRD: CPT | Mod: S$GLB,,, | Performed by: INTERNAL MEDICINE

## 2017-03-28 PROCEDURE — 99999 PR PBB SHADOW E&M-EST. PATIENT-LVL III: CPT | Mod: PBBFAC,,, | Performed by: INTERNAL MEDICINE

## 2017-03-28 PROCEDURE — 3046F HEMOGLOBIN A1C LEVEL >9.0%: CPT | Mod: S$GLB,,, | Performed by: INTERNAL MEDICINE

## 2017-03-28 PROCEDURE — 1126F AMNT PAIN NOTED NONE PRSNT: CPT | Mod: S$GLB,,, | Performed by: INTERNAL MEDICINE

## 2017-03-28 PROCEDURE — 3077F SYST BP >= 140 MM HG: CPT | Mod: S$GLB,,, | Performed by: INTERNAL MEDICINE

## 2017-03-28 NOTE — PROGRESS NOTES
Subjective:    Patient ID:  Mariangel Moreau is a 71 y.o. female who presents for follow-up of Hospital Follow Up      HPI     Recently went to the ER x 2 for CP  HPI: This 71 y.o. Female with HTN, DM, and sarcoidosis presents to the ED c/o severe (pain rating 10/10), sharp, intermittent, right sided chest pain that radiates to the back since Saturday. The chest pain is exacerbated by movement. The pt stated that the pain occurs every few minutes. The pt does not have any hx of heart problems. The pt took Aleve but it did not help alleviate her symptoms. The pt denies SOB, cough, fever, vomiting, rhinorrhea, sore throat, or leg swelling. No recent travel, immobility, clots, hemoptysis, or hormone therapy.      Still with some chest soreness  Denies prior Hx CAD  EKG 3/21/17 - NSR - ok      Review of Systems   Constitution: Negative for decreased appetite.   HENT: Negative for ear discharge.    Eyes: Negative for blurred vision.   Respiratory: Negative for hemoptysis.    Endocrine: Negative for polyphagia.   Hematologic/Lymphatic: Negative for adenopathy.   Skin: Negative for color change.   Musculoskeletal: Negative for joint swelling.   Neurological: Negative for brief paralysis.   Psychiatric/Behavioral: Negative for hallucinations.        Objective:    Physical Exam   Constitutional: She is oriented to person, place, and time. She appears well-developed and well-nourished.   HENT:   Head: Normocephalic and atraumatic.   Eyes: Conjunctivae are normal. Pupils are equal, round, and reactive to light.   Neck: Normal range of motion. Neck supple.   Cardiovascular: Normal rate, normal heart sounds and intact distal pulses.    Pulmonary/Chest: Effort normal and breath sounds normal.   Abdominal: Soft. Bowel sounds are normal.   Musculoskeletal: Normal range of motion.   Neurological: She is alert and oriented to person, place, and time.   Skin: Skin is warm and dry.         Assessment:       1. Chest pain, unspecified  type    2. Type 2 diabetes mellitus with complication, without long-term current use of insulin    3. Fall, sequela    4. Pure hypercholesterolemia    5. Sarcoidosis         Plan:       Will check echo given HTN and Hx sarcoidosis  Lexiscan myoview for CP

## 2017-03-28 NOTE — MR AVS SNAPSHOT
[facility]  [address] [place] [address]  Phone: [phone]                  Mariangel Moreau   3/28/2017 10:45 AM   Office Visit    Description:  Female : 1945   Provider:  Gustavo Camp MD   Department:  [facility]           Reason for Visit     Hospital Follow Up           Diagnoses this Visit        Comments    Chest pain, unspecified type    -  Primary     Type 2 diabetes mellitus with complication, without long-term current use of insulin         Fall, sequela         Pure hypercholesterolemia         Sarcoidosis                To Do List           Future Appointments        Provider Department Dept Phone    3/28/2017 10:45 AM Gustavo Camp MD [facility] [phone]      Goals (5 Years of Data)     None      [facility]     [facility] On Call Nurse Care Line -  Assistance  Registered nurses in the [facility] Call Center provide clinical advisement, health education, appointment booking, and other advisory services.  Call for this free service at [phone].             Medications           Message regarding Medications     Verify the changes and/or additions to your medication regime listed below are the same as discussed with your clinician today.  If any of these changes or additions are incorrect, please notify your healthcare provider.             Verify that the below list of medications is an accurate representation of the medications you are currently taking.  If none reported, the list may be blank. If incorrect, please contact your healthcare provider. Carry this list with you in case of emergency.           Current Medications     aspirin (ECOTRIN) 81 MG EC tablet Take 1 tablet (81 mg total) by mouth once daily.    calcium-vitamin D3 500 mg(1,250mg) -200 unit per tablet Take 1 tablet by mouth 2 (two) times daily with meals.    glipiZIDE (GLUCOTROL) 10 MG tablet Take 10 mg by mouth 2 (two) times daily before meals.     "lisinopril 10 MG tablet Take 1 tablet (10 mg total) by mouth once daily.    lovastatin (MEVACOR) 20 MG tablet Take 1 tablet (20 mg total) by mouth every evening.    multivitamin capsule Take 1 capsule by mouth once daily.           Clinical Reference Information           Your Vitals Were     BP Pulse Height Weight SpO2 BMI    189/84 (BP Location: Right arm, Patient Position: Sitting, BP Method: Automatic) 69 5' 9.5" (1.765 m) 85.3 kg (188 lb) 100% 27.36 kg/m2      Blood Pressure          Most Recent Value    BP  (!)  189/84      Allergies as of 3/28/2017     Darvocet A500 [Propoxyphene N-acetaminophen]    Penicillins    Percocet [Oxycodone-acetaminophen]      Immunizations Administered on Date of Encounter - 3/28/2017     None      [facility] Sign-Up     Activating your [facility] account is as easy as 1-2-3!     1) Visit [email or web address], select Sign Up Now, enter this activation code and your date of birth, then select Next.  [email or web address]  Expires: 5/2/2017 10:49 PM      2) Create a username and password to use when you visit [facility] in the future and select a security question in case you lose your password and select Next.    3) Enter your e-mail address and click Sign Up!    Additional Information  If you have questions, please e-mail [email or web address] or call [phone] to talk to our [facility] staff. Remember, [facility] is NOT to be used for urgent needs. For medical emergencies, dial 911.         Language Assistance Services     ATTENTION: Language assistance services are available, free of charge. Please call [phone].      ATENCIÓN: Si habla español, tiene a restrepo disposición servicios gratuitos de asistencia lingüística. Llame al [phone].     CHÚ Ý: N?u b?n nói Ti?ng Vi?t, có các d?ch v? h? tr? ngôn ng? mi?n phí dành cho b?n. G?i s? [phone].         [facility] complies with applicable Federal civil rights laws and does not discriminate on the basis of race, color, " national origin, age, disability, or sex.

## 2017-04-10 ENCOUNTER — HOSPITAL ENCOUNTER (OUTPATIENT)
Dept: RADIOLOGY | Facility: HOSPITAL | Age: 72
Discharge: HOME | End: 2017-04-10
Attending: INTERNAL MEDICINE
Payer: MEDICARE

## 2017-04-10 ENCOUNTER — HOSPITAL ENCOUNTER (OUTPATIENT)
Dept: CARDIOLOGY | Facility: HOSPITAL | Age: 72
Discharge: HOME | End: 2017-04-10
Attending: INTERNAL MEDICINE
Payer: MEDICARE

## 2017-04-10 DIAGNOSIS — E11.8 TYPE 2 DIABETES MELLITUS WITH COMPLICATION, WITHOUT LONG-TERM CURRENT USE OF INSULIN: ICD-10-CM

## 2017-04-10 DIAGNOSIS — R07.9 CHEST PAIN, UNSPECIFIED TYPE: ICD-10-CM

## 2017-04-10 DIAGNOSIS — D86.9 SARCOIDOSIS: ICD-10-CM

## 2017-04-10 DIAGNOSIS — E78.00 PURE HYPERCHOLESTEROLEMIA: ICD-10-CM
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
## 2017-04-10 LAB
DIASTOLIC DYSFUNCTION: NO
DIASTOLIC DYSFUNCTION: NO
ESTIMATED PA SYSTOLIC PRESSURE: 23.79
GLOBAL PERICARDIAL EFFUSION: NORMAL
MITRAL VALVE MOBILITY: NORMAL
MITRAL VALVE REGURGITATION: NORMAL
RETIRED EF AND QEF - SEE NOTES: 60 (ref 55–65)
TRICUSPID VALVE REGURGITATION: NORMAL

## 2017-04-10 PROCEDURE — 93306 TTE W/DOPPLER COMPLETE: CPT

## 2017-04-10 PROCEDURE — 78452 HT MUSCLE IMAGE SPECT MULT: CPT | Mod: TC

## 2017-04-10 PROCEDURE — 93016 CV STRESS TEST SUPVJ ONLY: CPT | Mod: ,,, | Performed by: INTERNAL MEDICINE

## 2017-04-10 PROCEDURE — 93306 TTE W/DOPPLER COMPLETE: CPT | Mod: 26,,, | Performed by: INTERNAL MEDICINE

## 2017-04-10 PROCEDURE — 63600175 PHARM REV CODE 636 W HCPCS

## 2017-04-10 PROCEDURE — 78452 HT MUSCLE IMAGE SPECT MULT: CPT | Mod: 26,,, | Performed by: INTERNAL MEDICINE

## 2017-04-10 PROCEDURE — 93018 CV STRESS TEST I&R ONLY: CPT | Mod: ,,, | Performed by: INTERNAL MEDICINE

## 2017-04-10 PROCEDURE — 93017 CV STRESS TEST TRACING ONLY: CPT

## 2017-04-10 RX ORDER — REGADENOSON 0.08 MG/ML
INJECTION, SOLUTION INTRAVENOUS
Status: DISPENSED
Start: 2017-04-10 | End: 2017-04-10

## 2017-04-21 ENCOUNTER — OFFICE VISIT (OUTPATIENT)
Dept: CARDIOLOGY | Facility: CLINIC | Age: 72
End: 2017-04-21
Payer: MEDICARE

## 2017-04-21 VITALS
HEART RATE: 68 BPM | BODY MASS INDEX: 27.85 KG/M2 | DIASTOLIC BLOOD PRESSURE: 80 MMHG | SYSTOLIC BLOOD PRESSURE: 191 MMHG | HEIGHT: 69 IN | WEIGHT: 188 LBS | OXYGEN SATURATION: 100 %

## 2017-04-21 DIAGNOSIS — D86.9 SARCOIDOSIS: ICD-10-CM

## 2017-04-21 DIAGNOSIS — R07.9 CHEST PAIN, UNSPECIFIED TYPE: Primary | ICD-10-CM

## 2017-04-21 DIAGNOSIS — E78.00 PURE HYPERCHOLESTEROLEMIA: ICD-10-CM

## 2017-04-21 DIAGNOSIS — E11.8 TYPE 2 DIABETES MELLITUS WITH COMPLICATION, WITHOUT LONG-TERM CURRENT USE OF INSULIN: ICD-10-CM

## 2017-04-21 PROCEDURE — 1160F RVW MEDS BY RX/DR IN RCRD: CPT | Mod: S$GLB,,, | Performed by: INTERNAL MEDICINE

## 2017-04-21 PROCEDURE — 1126F AMNT PAIN NOTED NONE PRSNT: CPT | Mod: S$GLB,,, | Performed by: INTERNAL MEDICINE

## 2017-04-21 PROCEDURE — 99999 PR PBB SHADOW E&M-EST. PATIENT-LVL III: CPT | Mod: PBBFAC,,, | Performed by: INTERNAL MEDICINE

## 2017-04-21 PROCEDURE — 1159F MED LIST DOCD IN RCRD: CPT | Mod: S$GLB,,, | Performed by: INTERNAL MEDICINE

## 2017-04-21 PROCEDURE — 99213 OFFICE O/P EST LOW 20 MIN: CPT | Mod: S$GLB,,, | Performed by: INTERNAL MEDICINE

## 2017-04-21 PROCEDURE — 3077F SYST BP >= 140 MM HG: CPT | Mod: S$GLB,,, | Performed by: INTERNAL MEDICINE

## 2017-04-21 PROCEDURE — 3079F DIAST BP 80-89 MM HG: CPT | Mod: S$GLB,,, | Performed by: INTERNAL MEDICINE

## 2017-04-21 PROCEDURE — 4010F ACE/ARB THERAPY RXD/TAKEN: CPT | Mod: S$GLB,,, | Performed by: INTERNAL MEDICINE

## 2017-04-21 RX ORDER — METFORMIN HYDROCHLORIDE 500 MG/1
TABLET, EXTENDED RELEASE ORAL
COMMUNITY
Start: 2017-04-07 | End: 2022-03-11

## 2017-04-21 RX ORDER — MELOXICAM 7.5 MG/1
7.5 TABLET ORAL DAILY
COMMUNITY

## 2017-04-21 NOTE — MR AVS SNAPSHOT
[facility]  [address] [place] [address]  Phone: [phone]                  Mariangel Moreau   2017 1:45 PM   Office Visit    Description:  Female : 1945   Provider:  Gustavo Camp MD   Department:  [facility]           Reason for Visit     Results                To Do List           Future Appointments        Provider Department Dept Phone    2017 1:45 PM Gustavo Camp MD [facility] [phone]      Goals (5 Years of Data)     None      [facility]     [facility] On Call Nurse Care Line -  Assistance  Unless otherwise directed by your provider, please contact [facility], our nurse care line that is available for  assistance.     Registered nurses in the [facility] Center provide: appointment scheduling, clinical advisement, health education, and other advisory services.  Call: [phone] (toll free)               Medications           Message regarding Medications     Verify the changes and/or additions to your medication regime listed below are the same as discussed with your clinician today.  If any of these changes or additions are incorrect, please notify your healthcare provider.             Verify that the below list of medications is an accurate representation of the medications you are currently taking.  If none reported, the list may be blank. If incorrect, please contact your healthcare provider. Carry this list with you in case of emergency.           Current Medications     aspirin (ECOTRIN) 81 MG EC tablet Take 1 tablet (81 mg total) by mouth once daily.    calcium-vitamin D3 500 mg(1,250mg) -200 unit per tablet Take 1 tablet by mouth 2 (two) times daily with meals.    glipiZIDE (GLUCOTROL) 10 MG tablet Take 10 mg by mouth 2 (two) times daily before meals.    lisinopril 10 MG tablet Take 1 tablet (10 mg total) by mouth once daily.    lovastatin (MEVACOR) 20 MG tablet Take 1 tablet (20 mg total) by  "mouth every evening.    meloxicam (MOBIC) 7.5 MG tablet Take 7.5 mg by mouth once daily.    metformin (GLUCOPHAGE-XR) 500 MG 24 hr tablet     multivitamin capsule Take 1 capsule by mouth once daily.           Clinical Reference Information           Your Vitals Were     BP Pulse Height Weight SpO2 BMI    191/80 (BP Location: Left arm, Patient Position: Sitting, BP Method: Automatic) 68 5' 9" (1.753 m) 85.3 kg (188 lb) 100% 27.76 kg/m2      Blood Pressure          Most Recent Value    BP  (!)  191/80      Allergies as of 4/21/2017     Darvocet A500 [Propoxyphene N-acetaminophen]    Penicillins    Percocet [Oxycodone-acetaminophen]      Immunizations Administered on Date of Encounter - 4/21/2017     None      [facility] Sign-Up     Activating your [facility] account is as easy as 1-2-3!     1) Visit [email or web address], select Sign Up Now, enter this activation code and your date of birth, then select Next.  [email or web address]  Expires: 5/2/2017 10:49 PM      2) Create a username and password to use when you visit [facility] in the future and select a security question in case you lose your password and select Next.    3) Enter your e-mail address and click Sign Up!    Additional Information  If you have questions, please e-mail [email or web address] or call [phone] to talk to our [facility] staff. Remember, [facility] is NOT to be used for urgent needs. For medical emergencies, dial 911.         Language Assistance Services     ATTENTION: Language assistance services are available, free of charge. Please call [phone].      ATENCIÓN: Si habla español, tiene a restrepo disposición servicios gratuitos de asistencia lingüística. Llame al [phone].     CHÚ Ý: N?u b?n nói Ti?ng Vi?t, có các d?ch v? h? tr? ngôn ng? mi?n phí dành cho b?n. G?i s? [phone].         [facility] complies with applicable Federal civil rights laws and does not discriminate on the basis of race, color, national origin, age, " disability, or sex.

## 2017-04-21 NOTE — PROGRESS NOTES
Subjective:    Patient ID:  Mariangel Moreau is a 71 y.o. female who presents for follow-up of Results      HPI     Recently went to the ER x 2 for CP  HPI: This 71 y.o. Female with HTN, DM, and sarcoidosis presents to the ED c/o severe (pain rating 10/10), sharp, intermittent, right sided chest pain that radiates to the back since Saturday. The chest pain is exacerbated by movement. The pt stated that the pain occurs every few minutes. The pt does not have any hx of heart problems. The pt took Aleve but it did not help alleviate her symptoms. The pt denies SOB, cough, fever, vomiting, rhinorrhea, sore throat, or leg swelling. No recent travel, immobility, clots, hemoptysis, or hormone therapy.    Stress test 4/10/17  LVEF: 50 %  Impression: NORMAL MYOCARDIAL PERFUSION  1. The perfusion scan is free of evidence for myocardial ischemia or injury.   2. Resting wall motion is physiologic.   3. Resting LV function is normal.     Echo 4/10/17    1 - Normal left ventricular systolic function (EF 60-65%).  Normal wall motion.     2 - Concentric hypertrophy.     3 - Trivial to mild mitral regurgitation.     4 - Trivial tricuspid regurgitation.     5 - The estimated PA systolic pressure is 24 mmHg.     CP less severe          Review of Systems   Constitution: Negative for decreased appetite.   HENT: Negative for ear discharge.    Eyes: Negative for blurred vision.   Respiratory: Negative for hemoptysis.    Endocrine: Negative for polyphagia.   Hematologic/Lymphatic: Negative for adenopathy.   Skin: Negative for color change.   Musculoskeletal: Negative for joint swelling.   Neurological: Negative for brief paralysis.   Psychiatric/Behavioral: Negative for hallucinations.        Objective:    Physical Exam   Constitutional: She is oriented to person, place, and time. She appears well-developed and well-nourished.   HENT:   Head: Normocephalic and atraumatic.   Eyes: Conjunctivae are normal. Pupils are equal, round, and reactive  to light.   Neck: Normal range of motion. Neck supple.   Cardiovascular: Normal rate, normal heart sounds and intact distal pulses.    Pulmonary/Chest: Effort normal and breath sounds normal.   Abdominal: Soft. Bowel sounds are normal.   Musculoskeletal: Normal range of motion.   Neurological: She is alert and oriented to person, place, and time.   Skin: Skin is warm and dry.         Assessment:       1. Chest pain, unspecified type    2. Type 2 diabetes mellitus with complication, without long-term current use of insulin    3. Pure hypercholesterolemia    4. Sarcoidosis         Plan:       Cardiac stable  OV 6 months

## 2017-06-17 ENCOUNTER — HOSPITAL ENCOUNTER (EMERGENCY)
Facility: HOSPITAL | Age: 72
Discharge: HOME | End: 2017-06-17
Attending: EMERGENCY MEDICINE
Payer: MEDICARE

## 2017-06-17 VITALS
SYSTOLIC BLOOD PRESSURE: 119 MMHG | WEIGHT: 180 LBS | DIASTOLIC BLOOD PRESSURE: 67 MMHG | HEIGHT: 70 IN | HEART RATE: 90 BPM | TEMPERATURE: 100 F | OXYGEN SATURATION: 99 % | BODY MASS INDEX: 25.77 KG/M2 | RESPIRATION RATE: 20 BRPM

## 2017-06-17 DIAGNOSIS — L02.214 ABSCESS OF GROIN, LEFT: Primary | ICD-10-CM

## 2017-06-17 DIAGNOSIS — S66.912A WRIST STRAIN, LEFT, INITIAL ENCOUNTER: ICD-10-CM

## 2017-06-17 PROCEDURE — 96372 THER/PROPH/DIAG INJ SC/IM: CPT

## 2017-06-17 PROCEDURE — 10060 I&D ABSCESS SIMPLE/SINGLE: CPT

## 2017-06-17 PROCEDURE — 87077 CULTURE AEROBIC IDENTIFY: CPT

## 2017-06-17 PROCEDURE — 87070 CULTURE OTHR SPECIMN AEROBIC: CPT

## 2017-06-17 PROCEDURE — 87186 SC STD MICRODIL/AGAR DIL: CPT

## 2017-06-17 PROCEDURE — 25000003 PHARM REV CODE 250: Performed by: EMERGENCY MEDICINE

## 2017-06-17 PROCEDURE — 63600175 PHARM REV CODE 636 W HCPCS: Performed by: EMERGENCY MEDICINE

## 2017-06-17 PROCEDURE — 99283 EMERGENCY DEPT VISIT LOW MDM: CPT | Mod: 25

## 2017-06-17 PROCEDURE — 29125 APPL SHORT ARM SPLINT STATIC: CPT | Mod: LT

## 2017-06-17 RX ORDER — LIDOCAINE HYDROCHLORIDE AND EPINEPHRINE 10; 10 MG/ML; UG/ML
10 INJECTION, SOLUTION INFILTRATION; PERINEURAL
Status: DISCONTINUED | OUTPATIENT
Start: 2017-06-17 | End: 2017-06-17 | Stop reason: RX

## 2017-06-17 RX ORDER — HYDROCODONE BITARTRATE AND ACETAMINOPHEN 5; 325 MG/1; MG/1
1 TABLET ORAL EVERY 6 HOURS PRN
Qty: 8 TABLET | Refills: 0 | Status: SHIPPED | OUTPATIENT
Start: 2017-06-17 | End: 2017-12-11

## 2017-06-17 RX ORDER — CLINDAMYCIN HYDROCHLORIDE 150 MG/1
450 CAPSULE ORAL EVERY 8 HOURS
Qty: 63 CAPSULE | Refills: 0 | Status: SHIPPED | OUTPATIENT
Start: 2017-06-17 | End: 2017-06-24

## 2017-06-17 RX ORDER — CLINDAMYCIN PHOSPHATE 150 MG/ML
600 INJECTION, SOLUTION INTRAVENOUS
Status: COMPLETED | OUTPATIENT
Start: 2017-06-17 | End: 2017-06-17

## 2017-06-17 RX ORDER — HYDROMORPHONE HYDROCHLORIDE 2 MG/ML
1 INJECTION, SOLUTION INTRAMUSCULAR; INTRAVENOUS; SUBCUTANEOUS
Status: COMPLETED | OUTPATIENT
Start: 2017-06-17 | End: 2017-06-17

## 2017-06-17 RX ORDER — LIDOCAINE HYDROCHLORIDE AND EPINEPHRINE 20; 10 MG/ML; UG/ML
10 INJECTION, SOLUTION INFILTRATION; PERINEURAL ONCE
Status: COMPLETED | OUTPATIENT
Start: 2017-06-17 | End: 2017-06-17

## 2017-06-17 RX ORDER — ONDANSETRON 4 MG/1
4 TABLET, ORALLY DISINTEGRATING ORAL
Status: COMPLETED | OUTPATIENT
Start: 2017-06-17 | End: 2017-06-17

## 2017-06-17 RX ADMIN — LIDOCAINE HYDROCHLORIDE,EPINEPHRINE BITARTRATE 10 ML: 20; .01 INJECTION, SOLUTION INFILTRATION; PERINEURAL at 12:06

## 2017-06-17 RX ADMIN — CLINDAMYCIN PHOSPHATE 600 MG: 150 INJECTION, SOLUTION INTRAVENOUS at 12:06

## 2017-06-17 RX ADMIN — HYDROMORPHONE HYDROCHLORIDE 1 MG: 2 INJECTION INTRAMUSCULAR; INTRAVENOUS; SUBCUTANEOUS at 11:06

## 2017-06-17 RX ADMIN — ONDANSETRON 4 MG: 4 TABLET, ORALLY DISINTEGRATING ORAL at 12:06

## 2017-06-17 NOTE — DISCHARGE INSTRUCTIONS
Please return to this emergency department to 3 days for wound check and possible packing change.  Please take medications as prescribed.  Return for new or worsening symptoms such as fever, chills, lightheadedness, redness or hard area spreading outside the areas of the marked off area of your skin.

## 2017-06-17 NOTE — ED TRIAGE NOTES
Pain and swelling left wrist for a few days no trauma also an abscess to genital area for 4-5 days

## 2017-06-17 NOTE — ED PROVIDER NOTES
"Encounter Date: 6/17/2017    SCRIBE #1 NOTE: I, Shameka Chatterjee , am scribing for, and in the presence of,  Jay Mcbride Jr., MD. I have scribed the following portions of the note - Other sections scribed: HPI/ROS .       History     Chief Complaint   Patient presents with    Abscess     States "I have a boil in my private area"    Wrist Pain     L wrist pain X several days. Denies injury     Review of patient's allergies indicates:   Allergen Reactions    Darvocet a500 [propoxyphene n-acetaminophen] Swelling    Penicillins Swelling    Percocet [oxycodone-acetaminophen]      CC: Abscess, Wrist Pain     HPI: This 71 y.o. female with HTN, NIDDM, and sarcoidosis presents to the ED, accompanied by her  and daughter, c/o an acute-onset abscess to the L inguinal crease which has been present for the past 4-5 days. Pt reports associated constant, moderate (5/10) pain and states that she "aggravated" the abscess by applying a heating pad and Boil Ease ointment. Pt notes she has had a hx of abscesses to the groin region, but never to the inguinal crease. Pt denies fever and chills.     Additionally, pt reports a few-days hx of acute-onset, atraumatic L wrist pain. Pt states the pain has reduced since onset. Pt can not recall any recent  injury, falls, or trauma to the wrist. Daughter notes the pt could have injured her wrist while rolling a heavy concrete table around her backyard. No prior attempted tx. No hx of injury to the affected wrist. Pt otherwise denies joint swelling, numbness, and weakness.         The history is provided by the patient and a relative (daughter). No  was used.     Past Medical History:   Diagnosis Date    Diabetes mellitus     Hypertension     Sarcoidosis      Past Surgical History:   Procedure Laterality Date    CHOLECYSTECTOMY       History reviewed. No pertinent family history.  Social History   Substance Use Topics    Smoking status: Never Smoker    " Smokeless tobacco: Not on file    Alcohol use No     Review of Systems   Constitutional: Negative for chills and fever.   HENT: Negative for congestion and sore throat.    Respiratory: Negative for cough and shortness of breath.    Cardiovascular: Negative for chest pain.   Gastrointestinal: Negative for abdominal pain, diarrhea, nausea and rectal pain.   Genitourinary: Negative for decreased urine volume, dysuria, flank pain, frequency, hematuria, urgency, vaginal bleeding, vaginal discharge and vaginal pain.   Musculoskeletal: Positive for arthralgias (L wrist). Negative for back pain and joint swelling.   Skin: Positive for wound (abscess to the L inguinal area ).       Physical Exam     Initial Vitals [06/17/17 1004]   BP Pulse Resp Temp SpO2   136/62 86 18 99 °F (37.2 °C) --     Physical Exam    Nursing note and vitals reviewed.  Constitutional: She appears well-developed and well-nourished. She is not diaphoretic. No distress.   HENT:   Head: Normocephalic and atraumatic.   Right Ear: External ear normal.   Left Ear: External ear normal.   Nose: Nose normal.   Mouth/Throat: Oropharynx is clear and moist.   Eyes: Conjunctivae and EOM are normal. Pupils are equal, round, and reactive to light. Right eye exhibits no discharge. Left eye exhibits no discharge. No scleral icterus.   Neck: Normal range of motion. Neck supple.   Cardiovascular: Normal rate, regular rhythm, normal heart sounds and intact distal pulses.   No murmur heard.  Genitourinary:   Genitourinary Comments: There is induration and erythema which is well-circumscribed in the area of the left inguinal crease.  There is some extension toward the proximal left thigh and the left labia majora.  No perineal involvement.  No evidence of bullae or other findings to suggest necrotizing infection/Alexandria's gangrene.    Musculoskeletal: Normal range of motion. She exhibits no edema or tenderness.   Neurological: She is alert and oriented to person, place,  and time. She has normal strength. No cranial nerve deficit or sensory deficit.   Skin: Skin is warm and dry. No rash noted. No erythema. No pallor.   Psychiatric: She has a normal mood and affect. Her behavior is normal. Judgment and thought content normal.         ED Course   I & D - Incision and Drainage  Date/Time: 6/17/2017 3:00 PM  Location procedure was performed: [facility] EMERGENCY DEPARTMENT  Performed by: RUFINA HARRELL JR  Authorized by: RUFINA HARRELL JR   Consent Done: Not Needed  Type: abscess  Body area: lower extremity  Location details: left leg  Anesthesia: local infiltration    Anesthesia:  Anesthesia: local infiltration  Local Anesthetic: lidocaine 1% with epinephrine   Anesthetic total: 4 mL  Patient sedated: no  Scalpel size: 11  Incision type: cruciate.  Complexity: simple  Drainage: pus  Drainage amount: copious  Wound treatment: incision and  wound packed  Packing material: 1/4 in gauze  Complications: No  Estimated blood loss (mL): 2  Specimens: Yes (wound culture)  Implants: No  Patient tolerance: Patient tolerated the procedure well with no immediate complications        Labs Reviewed   CULTURE, AEROBIC  (SPECIFY SOURCE)             Medical Decision Making:   ED Management:  This is the emergent evaluation of a 71-year-old female presents the emergency department with abscess to the left inguinal area.  Differential diagnosis includes: Drainable abscess, cellulitis, necrotizing infection.  I do not suspect necrotizing infection after inspection.  There is no evidence of perineal involvement or evidence of anything approximately Alexandria's gangrene.  The patient does have a history of diabetes and there is surrounding induration and erythema which is well-circumscribed around the abscess.  The patient received an intramuscular dose of clindamycin after wound culture was obtained.  Draining of the abscess revealed copious amount of drainage.  The patient is afebrile and though there  is surrounding redness, it is well-circumscribed.  I believe that this patient can be treated with home oral antibiotics provided that she returns within 2-3 days for a wound check which I have emphasized.  The patient's daughter is a nurse and I emphasized the importance of watching the wound and making sure that redness does not spread outside of the skin markings were marked in the emergency department today area and I also emphasized return for any fevers, chills, or vomiting, or any other concerning symptoms.            Scribe Attestation:   Scribe #1: I performed the above scribed service and the documentation accurately describes the services I performed. I attest to the accuracy of the note.    Attending Attestation:           Physician Attestation for Scribe:  Physician Attestation Statement for Scribe #1: I, Jay Mcbride Jr., MD , reviewed documentation, as scribed by Shameka Chatterjee  in my presence, and it is both accurate and complete.                 ED Course     Clinical Impression:   The primary encounter diagnosis was Abscess of groin, left. A diagnosis of Wrist strain, left, initial encounter was also pertinent to this visit.          Jay Mcbride Jr., MD  06/17/17 7884       Jay Mcbride Jr., MD  06/17/17 4202

## 2017-06-19 LAB — BACTERIA SPEC AEROBE CULT: NORMAL

## 2017-06-20 ENCOUNTER — TELEPHONE (OUTPATIENT)
Dept: DERMATOLOGY | Facility: CLINIC | Age: 72
End: 2017-06-20

## 2017-06-20 NOTE — TELEPHONE ENCOUNTER
----- Message from Kathy Bennett RN sent at 6/20/2017 11:32 AM CDT -----  Contact: pt   I spoke to the patient and explained that we do not have any appointments available at this time but, Im not sure if you want to ask Dr. Montanez if she would like to fit this patient in. The ER specifically said for her to follow up with Dr. Montanez in two days for some reason. Id appreciate any help or just some feedback if you are able to talk to her. Thank you.     Kathy   ----- Message -----  From: Radha Sosa  Sent: 6/20/2017   8:24 AM  To: NICOLE Grace-pt- pt is calling to speak with the  Nurse pt is a new pt to derm pt was seen in the er last night and was told to follow up with Dr Montanez in two days. Pt had an an incision and drainage from an abscess in her groin area pt said she had a culture done. Can you please call pt at [phone]    DIDI

## 2017-06-20 NOTE — TELEPHONE ENCOUNTER
----- Message from Sybil Mino sent at 6/20/2017 12:11 PM CDT -----  Contact: pt [phone]  Pt went to the ED yesterday for an abscess on the left groin.    These are her AV'S instructions:    ED After Visit Summary (Printed 6/19/2017)   Follow-Ups     1 Schedule an appointment with Ashley Cain MD (Infectious Diseases) in 2 days (6/21/2017); for follow up   2 Schedule an appointment with Regla Montanez MD (Dermatology) in 2 days (6/21/2017); for follow up with Dermatologist   3 Go to [facility] (Emergency Medicine); As needed, If symptoms worsen    Pt is requesting a call from the dermatology staff about getting seen.  Pt already has her apt to follow up with Dr Cain/ID at [facility].    Pt can be reached at [phone]  HIGH PRIORITY per pt    Thanks  sindhu

## 2017-06-21 ENCOUNTER — OFFICE VISIT (OUTPATIENT)
Dept: DERMATOLOGY | Facility: CLINIC | Age: 72
End: 2017-06-21
Payer: MEDICARE

## 2017-06-21 VITALS — WEIGHT: 190 LBS | BODY MASS INDEX: 28.06 KG/M2

## 2017-06-21 DIAGNOSIS — L08.0 PYODERMA: Primary | ICD-10-CM

## 2017-06-21 DIAGNOSIS — L30.0 NUMMULAR ECZEMATOUS DERMATITIS: ICD-10-CM

## 2017-06-21 PROCEDURE — 1159F MED LIST DOCD IN RCRD: CPT | Mod: S$GLB,,, | Performed by: DERMATOLOGY

## 2017-06-21 PROCEDURE — 99999 PR PBB SHADOW E&M-EST. PATIENT-LVL II: CPT | Mod: PBBFAC,,, | Performed by: DERMATOLOGY

## 2017-06-21 PROCEDURE — 99202 OFFICE O/P NEW SF 15 MIN: CPT | Mod: S$GLB,,, | Performed by: DERMATOLOGY

## 2017-06-21 RX ORDER — MUPIROCIN 20 MG/G
OINTMENT TOPICAL
Qty: 30 G | Refills: 2 | Status: SHIPPED | OUTPATIENT
Start: 2017-06-21

## 2017-06-21 RX ORDER — GLIPIZIDE 10 MG/1
TABLET, FILM COATED, EXTENDED RELEASE ORAL
COMMUNITY
Start: 2017-05-12

## 2017-06-21 NOTE — PROGRESS NOTES
Subjective:       Patient ID:  Mariangel Moreau is a 71 y.o. female who presents for   Chief Complaint   Patient presents with    Lesion     groin     See notes ED had abcess groin area now improving with abx.  Also has patches of dry skin on trunk for months no tx no sx.       Lesion         Review of Systems   Constitutional: Negative for fever.   Skin: Positive for rash. Negative for itching.   Hematologic/Lymphatic: Does not bruise/bleed easily.        Objective:    Physical Exam   Constitutional: She appears well-developed and well-nourished. No distress.   Neurological: She is alert and oriented to person, place, and time. She is not disoriented.   Psychiatric: She has a normal mood and affect.   Skin:   Areas Examined (abnormalities noted in diagram):   Head / Face Inspection Performed  Neck Inspection Performed  Chest / Axilla Inspection Performed  Genitals / Buttocks / Groin Inspection Performed  Back Inspection Performed  RUE Inspected  LUE Inspection Performed              Diagram Legend      Erythematous eczematous patches       See annotation      Assessment / Plan:        Pyoderma  -     mupirocin (BACTROBAN) 2 % ointment; Use qd on wound  Dispense: 30 g; Refill: 2  See notes ED    Culture grew MORGANELLA MORGANII  On cipro and clindamycine    Nummular eczematous dermatitis  No hot water  cerave lotion after baths           Return if symptoms worsen or fail to improve.

## 2017-12-11 ENCOUNTER — HOSPITAL ENCOUNTER (EMERGENCY)
Facility: HOSPITAL | Age: 72
Discharge: HOME | End: 2017-12-11
Attending: EMERGENCY MEDICINE
Payer: MEDICARE

## 2017-12-11 VITALS
HEIGHT: 69 IN | BODY MASS INDEX: 27.55 KG/M2 | SYSTOLIC BLOOD PRESSURE: 138 MMHG | DIASTOLIC BLOOD PRESSURE: 63 MMHG | TEMPERATURE: 98 F | WEIGHT: 186 LBS | HEART RATE: 66 BPM | OXYGEN SATURATION: 99 % | RESPIRATION RATE: 16 BRPM

## 2017-12-11 DIAGNOSIS — R51.9 HEADACHE: ICD-10-CM

## 2017-12-11 DIAGNOSIS — M54.2 NECK PAIN: Primary | ICD-10-CM

## 2017-12-11 LAB
ALBUMIN SERPL BCP-MCNC: 3.6 G/DL
ALP SERPL-CCNC: 94 U/L
ALT SERPL W/O P-5'-P-CCNC: 10 U/L
ANION GAP SERPL CALC-SCNC: 12 MMOL/L
AST SERPL-CCNC: 17 U/L
BASOPHILS # BLD AUTO: 0.02 K/UL
BASOPHILS NFR BLD: 0.2 %
BILIRUB SERPL-MCNC: 0.3 MG/DL
BUN SERPL-MCNC: 13 MG/DL
CALCIUM SERPL-MCNC: 9.9 MG/DL
CHLORIDE SERPL-SCNC: 101 MMOL/L
CO2 SERPL-SCNC: 28 MMOL/L
CREAT SERPL-MCNC: 0.8 MG/DL
DIFFERENTIAL METHOD: ABNORMAL
EOSINOPHIL # BLD AUTO: 0.1 K/UL
EOSINOPHIL NFR BLD: 0.8 %
ERYTHROCYTE [DISTWIDTH] IN BLOOD BY AUTOMATED COUNT: 16.5 %
EST. GFR  (AFRICAN AMERICAN): >60 ML/MIN/1.73 M^2
EST. GFR  (NON AFRICAN AMERICAN): >60 ML/MIN/1.73 M^2
GLUCOSE SERPL-MCNC: 56 MG/DL
HCT VFR BLD AUTO: 37.2 %
HGB BLD-MCNC: 11.4 G/DL
LYMPHOCYTES # BLD AUTO: 2.6 K/UL
LYMPHOCYTES NFR BLD: 26.5 %
MCH RBC QN AUTO: 22.2 PG
MCHC RBC AUTO-ENTMCNC: 30.6 G/DL
MCV RBC AUTO: 73 FL
MONOCYTES # BLD AUTO: 0.6 K/UL
MONOCYTES NFR BLD: 5.7 %
NEUTROPHILS # BLD AUTO: 6.4 K/UL
NEUTROPHILS NFR BLD: 66.8 %
PLATELET # BLD AUTO: 338 K/UL
PMV BLD AUTO: 10.7 FL
POLYCHROMASIA BLD QL SMEAR: ABNORMAL
POTASSIUM SERPL-SCNC: 3.7 MMOL/L
PROT SERPL-MCNC: 8.6 G/DL
RBC # BLD AUTO: 5.13 M/UL
SODIUM SERPL-SCNC: 141 MMOL/L
WBC # BLD AUTO: 9.63 K/UL

## 2017-12-11 PROCEDURE — 25000003 PHARM REV CODE 250: Performed by: EMERGENCY MEDICINE

## 2017-12-11 PROCEDURE — 85025 COMPLETE CBC W/AUTO DIFF WBC: CPT

## 2017-12-11 PROCEDURE — 80053 COMPREHEN METABOLIC PANEL: CPT

## 2017-12-11 PROCEDURE — 93010 ELECTROCARDIOGRAM REPORT: CPT | Mod: ,,, | Performed by: INTERNAL MEDICINE

## 2017-12-11 PROCEDURE — 96374 THER/PROPH/DIAG INJ IV PUSH: CPT

## 2017-12-11 PROCEDURE — 93005 ELECTROCARDIOGRAM TRACING: CPT

## 2017-12-11 PROCEDURE — 96375 TX/PRO/DX INJ NEW DRUG ADDON: CPT

## 2017-12-11 PROCEDURE — 63600175 PHARM REV CODE 636 W HCPCS: Performed by: EMERGENCY MEDICINE

## 2017-12-11 PROCEDURE — 99284 EMERGENCY DEPT VISIT MOD MDM: CPT | Mod: 25

## 2017-12-11 RX ORDER — BUTALBITAL, ACETAMINOPHEN AND CAFFEINE 50; 325; 40 MG/1; MG/1; MG/1
1 TABLET ORAL EVERY 4 HOURS PRN
Qty: 15 TABLET | Refills: 0 | Status: SHIPPED | OUTPATIENT
Start: 2017-12-11 | End: 2018-01-10

## 2017-12-11 RX ORDER — PROCHLORPERAZINE EDISYLATE 5 MG/ML
5 INJECTION INTRAMUSCULAR; INTRAVENOUS
Status: COMPLETED | OUTPATIENT
Start: 2017-12-11 | End: 2017-12-11

## 2017-12-11 RX ORDER — BUTALBITAL, ACETAMINOPHEN AND CAFFEINE 50; 325; 40 MG/1; MG/1; MG/1
1 TABLET ORAL
Status: COMPLETED | OUTPATIENT
Start: 2017-12-11 | End: 2017-12-11

## 2017-12-11 RX ORDER — BUTALBITAL, ACETAMINOPHEN AND CAFFEINE 50; 325; 40 MG/1; MG/1; MG/1
1 TABLET ORAL EVERY 4 HOURS PRN
Status: DISCONTINUED | OUTPATIENT
Start: 2017-12-11 | End: 2017-12-11 | Stop reason: HOSPADM

## 2017-12-11 RX ORDER — KETOROLAC TROMETHAMINE 30 MG/ML
15 INJECTION, SOLUTION INTRAMUSCULAR; INTRAVENOUS
Status: COMPLETED | OUTPATIENT
Start: 2017-12-11 | End: 2017-12-11

## 2017-12-11 RX ADMIN — KETOROLAC TROMETHAMINE 15 MG: 30 INJECTION, SOLUTION INTRAMUSCULAR at 12:12

## 2017-12-11 RX ADMIN — PROCHLORPERAZINE EDISYLATE 5 MG: 5 INJECTION INTRAMUSCULAR; INTRAVENOUS at 12:12

## 2017-12-11 RX ADMIN — BUTALBITAL, ACETAMINOPHEN AND CAFFEINE 1 TABLET: 50; 325; 40 TABLET ORAL at 12:12

## 2017-12-11 NOTE — ED PROVIDER NOTES
Encounter Date: 12/11/2017    SCRIBE #1 NOTE: I, Jenna De La Cruz, am scribing for, and in the presence of,  Silvio Laboy PA-C . I have scribed the following portions of the note - Other sections scribed: HPI and ROS .       History     Chief Complaint   Patient presents with    Neck Pain     states woke up with pains to back of head down right neck, denies trauma. PMH HTN, (took her meds this am.)     CC:Neck Pain.   History obtained from patient and her daughter.   Pt arrived via personal transportation.     HPI: This 72 y.o. Female, who has DM, HTN, and Sarcoidosis, presents to the ED for evaluation of a 3 day history of neck pain and occipital head pain. Pain began upon waking, is constant, and is rated as a moderate, 6/10. She denies any recent trauma to head/neck, falls, or recent car accidents. She states she is having difficulty moving the neck and has slept in a chair at night rather than her bed secondary to pain. She denies extremity weakness, facial weakness, numbness, vision change, speech difficulty, chest pain, SOB, or abdominal pain. Daughter reports the patient having a low-grade fever measured at 99 this morning. Treatment with Ibuprofen (taken last night) provided mild relief. Daughter denies observation of any facial asymmetry, change in ambulation, or speech difficulty. No further reported symptoms.       The history is provided by the patient and a relative. No  was used.     Review of patient's allergies indicates:   Allergen Reactions    Darvocet a500 [propoxyphene n-acetaminophen] Swelling    Penicillins Swelling    Percocet [oxycodone-acetaminophen]      Past Medical History:   Diagnosis Date    Diabetes mellitus     Hypertension     Sarcoidosis      Past Surgical History:   Procedure Laterality Date    CHOLECYSTECTOMY       History reviewed. No pertinent family history.  Social History   Substance Use Topics    Smoking status: Never Smoker    Smokeless tobacco:  Never Used    Alcohol use No     Review of Systems   Constitutional: Negative for chills.   HENT: Negative for congestion and rhinorrhea.    Eyes: Negative for visual disturbance.   Respiratory: Negative for cough and shortness of breath.    Cardiovascular: Negative for chest pain.   Gastrointestinal: Negative for abdominal pain, diarrhea, nausea and vomiting.   Musculoskeletal: Positive for neck pain and neck stiffness.   Skin: Negative for rash.   Neurological: Positive for headaches. Negative for syncope, facial asymmetry, speech difficulty, weakness and numbness.   Psychiatric/Behavioral: Negative for confusion.       Physical Exam     Initial Vitals [12/11/17 0903]   BP Pulse Resp Temp SpO2   (!) 217/84 82 16 98.6 °F (37 °C) 100 %      MAP       128.33         Physical Exam    Nursing note and vitals reviewed.  Constitutional: She appears well-developed and well-nourished. She is not diaphoretic. No distress.   HENT:   Head: Normocephalic and atraumatic.   Right Ear: External ear normal.   Left Ear: External ear normal.   Nose: Nose normal.   Mouth/Throat: Oropharynx is clear and moist.   Eyes: Conjunctivae and EOM are normal. Pupils are equal, round, and reactive to light. Right eye exhibits no discharge. Left eye exhibits no discharge. No scleral icterus.   Neck: Normal range of motion. Neck supple.   Patient has full range of motion.  There is discomfort when moving the neck from left to right.  There is a firm approximately 2 cm x 2 cm circumferential nodule in the right mid posterior cervical area.   Cardiovascular: Normal rate, regular rhythm, normal heart sounds and intact distal pulses.   No murmur heard.  Pulmonary/Chest: Breath sounds normal. No respiratory distress. She has no wheezes. She has no rhonchi. She has no rales.   Abdominal: Soft. Bowel sounds are normal. She exhibits no distension and no mass. There is no tenderness. There is no rebound and no guarding.   Musculoskeletal: Normal range of  motion. She exhibits no edema or tenderness.   Lymphadenopathy:     She has cervical adenopathy.   Neurological: She is alert and oriented to person, place, and time. She has normal strength. No cranial nerve deficit or sensory deficit.   GCS is 15.  Patient is alert and oriented to person, place, time, and events.  Cranial nerves II-XII intact by exam.  Strength and sensation equal and intact in all 4 extremities.  There is no evidence of ataxia or dismetria.   Skin: Skin is warm and dry. No rash noted. No erythema. No pallor.   Psychiatric: She has a normal mood and affect. Her behavior is normal. Judgment and thought content normal.         ED Course   Procedures  Labs Reviewed - No data to display  EKG Readings: (Independently Interpreted)   Initial Reading: No STEMI.   EKG reviewed and interpreted by me shows sinus rhythm and rate is 79.  NJ, QRS, QT intervals within normal limits.  Is normal axis.  There is normal R-wave progression.  There are no ST segment or T-wave ischemic findings.       X-Rays:   Independently Interpreted Readings:   Other Readings:  CT the head reviewed and interpreted by me shows no evidence of intracranial hemorrhage or mass effect.    Medical Decision Making:   ED Management:  This is the emergent evaluation of a 72-year-old female presents the emergency department with bilateral neck pain that started 3 days ago.  Patient also complains of occipital headache.Differential diagnosis at the time of initial evaluation included, but was not limited to:  Tension headache, cluster headache, migraine headache, intracranial hemorrhage.  I considered but doubt cervical artery dissection.  Patient's symptoms are bilateral.  She has no fever or chills or confusion.  No focal neurologic deficits.  She has full range of motion of her neck but with discomfort when moving the neck from right to left.    Patient's laboratory evaluation is unremarkable.  Patient's had a head CT which showed remote  gliosis but no acute process such as mass effect or intracranial hemorrhage.  I do not suspect occult subarachnoid hemorrhage at this time.  Do not suspect encephalitis or meningitis.  I believe this patient is safe and stable to go home with symptomatic treatment and close follow-up.  I explained all the above to the patient.  Patient's daughter was also at the bedside.  All questions answered.  Patient was advised to follow-up as above and return for new or worsening symptoms such as fever, worsening headache, abnormal neurologic symptoms such as double vision or difficulty speaking or weakness.  Upon discharge, patient's headache is almost resolved and her blood pressure has decreased significantly.  Do not suspect hypertensive crisis.            Scribe Attestation:   Scribe #1: I performed the above scribed service and the documentation accurately describes the services I performed. I attest to the accuracy of the note.    Attending Attestation:           Physician Attestation for Scribe:  Physician Attestation Statement for Scribe #1: I, Silvio Laboy PA-C , reviewed documentation, as scribed by Jenna De La Cruz  in my presence, and it is both accurate and complete.                 ED Course      Clinical Impression:   Headache, neck pain                         Jay Mcbride Jr., MD  12/11/17 3397

## 2017-12-11 NOTE — ED NOTES
ED charge nurse notified of need for bed to continue care for this patient, will continue to monitor.

## 2017-12-11 NOTE — DISCHARGE INSTRUCTIONS
Please follow-up with your PCP in the next 1-2 days for further evaluation and management.  Please return for new or worsening symptoms such as fever, double vision, confusion, or severe worsening of headache.

## 2017-12-29 ENCOUNTER — HOSPITAL ENCOUNTER (EMERGENCY)
Facility: HOSPITAL | Age: 72
Discharge: HOME | End: 2017-12-29
Attending: EMERGENCY MEDICINE
Payer: MEDICARE

## 2017-12-29 VITALS
OXYGEN SATURATION: 95 % | WEIGHT: 200 LBS | TEMPERATURE: 98 F | HEART RATE: 90 BPM | DIASTOLIC BLOOD PRESSURE: 65 MMHG | BODY MASS INDEX: 29.62 KG/M2 | SYSTOLIC BLOOD PRESSURE: 136 MMHG | RESPIRATION RATE: 18 BRPM | HEIGHT: 69 IN

## 2017-12-29 DIAGNOSIS — B34.9 VIRAL SYNDROME: Primary | ICD-10-CM

## 2017-12-29 DIAGNOSIS — R11.10 NON-INTRACTABLE VOMITING, PRESENCE OF NAUSEA NOT SPECIFIED, UNSPECIFIED VOMITING TYPE: ICD-10-CM

## 2017-12-29 DIAGNOSIS — R05.9 COUGH: ICD-10-CM

## 2017-12-29 LAB
ALBUMIN SERPL BCP-MCNC: 3.5 G/DL
ALP SERPL-CCNC: 71 U/L
ALT SERPL W/O P-5'-P-CCNC: 30 U/L
ANION GAP SERPL CALC-SCNC: 10 MMOL/L
ANISOCYTOSIS BLD QL SMEAR: SLIGHT
AST SERPL-CCNC: 33 U/L
BACTERIA #/AREA URNS HPF: ABNORMAL /HPF
BASOPHILS # BLD AUTO: 0.03 K/UL
BASOPHILS NFR BLD: 0.4 %
BILIRUB SERPL-MCNC: 0.5 MG/DL
BILIRUB UR QL STRIP: NEGATIVE
BUN SERPL-MCNC: 21 MG/DL
CALCIUM SERPL-MCNC: 9.4 MG/DL
CHLORIDE SERPL-SCNC: 98 MMOL/L
CLARITY UR: ABNORMAL
CO2 SERPL-SCNC: 28 MMOL/L
COLOR UR: ABNORMAL
CREAT SERPL-MCNC: 0.9 MG/DL
DIFFERENTIAL METHOD: ABNORMAL
EOSINOPHIL # BLD AUTO: 0 K/UL
EOSINOPHIL NFR BLD: 0 %
ERYTHROCYTE [DISTWIDTH] IN BLOOD BY AUTOMATED COUNT: 16.5 %
EST. GFR  (AFRICAN AMERICAN): >60 ML/MIN/1.73 M^2
EST. GFR  (NON AFRICAN AMERICAN): >60 ML/MIN/1.73 M^2
FLUAV AG SPEC QL IA: NEGATIVE
FLUBV AG SPEC QL IA: NEGATIVE
GLUCOSE SERPL-MCNC: 106 MG/DL
GLUCOSE UR QL STRIP: NEGATIVE
HCT VFR BLD AUTO: 36.7 %
HGB BLD-MCNC: 11.3 G/DL
HGB UR QL STRIP: NEGATIVE
HYALINE CASTS #/AREA URNS LPF: 0 /LPF
KETONES UR QL STRIP: NEGATIVE
LEUKOCYTE ESTERASE UR QL STRIP: NEGATIVE
LIPASE SERPL-CCNC: 28 U/L
LYMPHOCYTES # BLD AUTO: 1.6 K/UL
LYMPHOCYTES NFR BLD: 23.5 %
MCH RBC QN AUTO: 22.3 PG
MCHC RBC AUTO-ENTMCNC: 30.8 G/DL
MCV RBC AUTO: 72 FL
MICROSCOPIC COMMENT: ABNORMAL
MONOCYTES # BLD AUTO: 0.7 K/UL
MONOCYTES NFR BLD: 10.3 %
NEUTROPHILS # BLD AUTO: 4.5 K/UL
NEUTROPHILS NFR BLD: 65.9 %
NITRITE UR QL STRIP: NEGATIVE
NON-SQ EPI CELLS #/AREA URNS HPF: 5 /HPF
OVALOCYTES BLD QL SMEAR: ABNORMAL
PH UR STRIP: 5 [PH] (ref 5–8)
PLATELET # BLD AUTO: 207 K/UL
PMV BLD AUTO: 10.2 FL
POCT GLUCOSE: 115 MG/DL (ref 70–110)
POLYCHROMASIA BLD QL SMEAR: ABNORMAL
POTASSIUM SERPL-SCNC: 4.1 MMOL/L
PROT SERPL-MCNC: 7.9 G/DL
PROT UR QL STRIP: ABNORMAL
RBC # BLD AUTO: 5.07 M/UL
RBC #/AREA URNS HPF: 0 /HPF (ref 0–4)
SODIUM SERPL-SCNC: 136 MMOL/L
SP GR UR STRIP: 1.02 (ref 1–1.03)
SPECIMEN SOURCE: NORMAL
SPHEROCYTES BLD QL SMEAR: ABNORMAL
URN SPEC COLLECT METH UR: ABNORMAL
UROBILINOGEN UR STRIP-ACNC: ABNORMAL EU/DL
WBC # BLD AUTO: 6.82 K/UL
WBC #/AREA URNS HPF: 2 /HPF (ref 0–5)

## 2017-12-29 PROCEDURE — 96361 HYDRATE IV INFUSION ADD-ON: CPT

## 2017-12-29 PROCEDURE — 87400 INFLUENZA A/B EACH AG IA: CPT | Mod: 59

## 2017-12-29 PROCEDURE — 63600175 PHARM REV CODE 636 W HCPCS: Performed by: EMERGENCY MEDICINE

## 2017-12-29 PROCEDURE — 99284 EMERGENCY DEPT VISIT MOD MDM: CPT | Mod: 25

## 2017-12-29 PROCEDURE — 96374 THER/PROPH/DIAG INJ IV PUSH: CPT

## 2017-12-29 PROCEDURE — 82962 GLUCOSE BLOOD TEST: CPT

## 2017-12-29 PROCEDURE — 85025 COMPLETE CBC W/AUTO DIFF WBC: CPT

## 2017-12-29 PROCEDURE — 80053 COMPREHEN METABOLIC PANEL: CPT

## 2017-12-29 PROCEDURE — 83690 ASSAY OF LIPASE: CPT

## 2017-12-29 PROCEDURE — 81000 URINALYSIS NONAUTO W/SCOPE: CPT

## 2017-12-29 PROCEDURE — 25000003 PHARM REV CODE 250: Performed by: EMERGENCY MEDICINE

## 2017-12-29 RX ORDER — ONDANSETRON 4 MG/1
4 TABLET, FILM COATED ORAL EVERY 8 HOURS PRN
Qty: 30 TABLET | Refills: 0 | Status: SHIPPED | OUTPATIENT
Start: 2017-12-29 | End: 2022-03-03

## 2017-12-29 RX ORDER — BENZONATATE 100 MG/1
100 CAPSULE ORAL 3 TIMES DAILY PRN
Qty: 20 CAPSULE | Refills: 0 | Status: SHIPPED | OUTPATIENT
Start: 2017-12-29 | End: 2018-01-08

## 2017-12-29 RX ORDER — ONDANSETRON 2 MG/ML
8 INJECTION INTRAMUSCULAR; INTRAVENOUS
Status: COMPLETED | OUTPATIENT
Start: 2017-12-29 | End: 2017-12-29

## 2017-12-29 RX ADMIN — SODIUM CHLORIDE 1000 ML: 0.9 INJECTION, SOLUTION INTRAVENOUS at 08:12

## 2017-12-29 RX ADMIN — ONDANSETRON 8 MG: 2 INJECTION INTRAMUSCULAR; INTRAVENOUS at 08:12

## 2017-12-30 NOTE — ED PROVIDER NOTES
Encounter Date: 12/29/2017    SCRIBE #1 NOTE: I, EstelitaSuki Jamie, am scribing for, and in the presence of,  Nava Melton MD. I have scribed the following portions of the note - Other sections scribed: HPI, ROS.       History     Chief Complaint   Patient presents with    Generalized Body Aches     body aches, + nausea and vomiting, and diarrhea.  denies fever.  cough - green x 1 week.       CC: Generalized Body Aches    73 y/o female with DM, HTN, and sarcoidosis presents to the ED c/o 1 wk hx of acute onset generalized myalgias, nausea, posttussive emesis (x3 ep today), diarrhea, productive cough, and weakness. The symptoms are moderate (5/10). The patient is unable to keep any food or drinks down. The patient presented to this facility on 12/11/17 for neck pain due to a sarcoidosis flare. The patient denies hx of heart failure. The patient denies any sick contacts. The patient denies abdominal pain, chest pain, or SOB. No attempted treatment reported. No alleviating factors or other symptoms reported.      The history is provided by the patient. No  was used.     Review of patient's allergies indicates:   Allergen Reactions    Darvocet a500 [propoxyphene n-acetaminophen] Swelling    Penicillins Swelling    Percocet [oxycodone-acetaminophen]      Past Medical History:   Diagnosis Date    Diabetes mellitus     Hypertension     Sarcoidosis      Past Surgical History:   Procedure Laterality Date    CHOLECYSTECTOMY       History reviewed. No pertinent family history.  Social History   Substance Use Topics    Smoking status: Never Smoker    Smokeless tobacco: Never Used    Alcohol use No     Review of Systems   Constitutional: Negative for chills, fatigue and fever.   HENT: Negative for congestion, drooling, facial swelling, rhinorrhea, sore throat and trouble swallowing.    Eyes: Negative for redness and visual disturbance.   Respiratory: Positive for cough (productive). Negative for  choking, chest tightness and shortness of breath.    Cardiovascular: Negative for chest pain and palpitations.   Gastrointestinal: Positive for diarrhea, nausea and vomiting (posttussive (x3 ep today)). Negative for abdominal pain.   Genitourinary: Negative for difficulty urinating, dysuria, frequency, hematuria and urgency.   Musculoskeletal: Positive for myalgias (generalized). Negative for back pain, gait problem, neck pain and neck stiffness.        (-) arm or leg trouble   Skin: Negative for rash and wound.   Neurological: Positive for weakness. Negative for headaches.   Hematological: Does not bruise/bleed easily.   Psychiatric/Behavioral: Negative for agitation and confusion.       Physical Exam     Initial Vitals [12/29/17 1848]   BP Pulse Resp Temp SpO2   136/65 87 16 98.8 °F (37.1 °C) 97 %      MAP       88.67         Physical Exam    Nursing note and vitals reviewed.  Constitutional: She appears well-developed and well-nourished. She is not diaphoretic. No distress.   HENT:   Head: Normocephalic and atraumatic.   Right Ear: External ear normal.   Left Ear: External ear normal.   Nose: Nose normal.   Mouth/Throat: Oropharynx is clear and moist. No oropharyngeal exudate.   Eyes: Conjunctivae and EOM are normal. Pupils are equal, round, and reactive to light. Right eye exhibits no discharge. Left eye exhibits no discharge.   Neck: Normal range of motion. Neck supple. No thyromegaly present. No tracheal deviation present.   Cardiovascular: Normal rate, regular rhythm, normal heart sounds and intact distal pulses. Exam reveals no gallop and no friction rub.    No murmur heard.  Pulmonary/Chest: Breath sounds normal. No respiratory distress. She has no wheezes. She has no rhonchi. She has no rales. She exhibits no tenderness.   Abdominal: Soft. Bowel sounds are normal. She exhibits no distension and no mass. There is no tenderness. There is no rebound and no guarding.   Musculoskeletal: Normal range of motion.  She exhibits no edema or tenderness.   Neurological: She is alert and oriented to person, place, and time. She has normal strength. She displays normal reflexes. No cranial nerve deficit or sensory deficit.   Skin: Skin is warm and dry. Capillary refill takes less than 2 seconds. No rash noted. No erythema. No pallor.   Psychiatric: She has a normal mood and affect. Thought content normal.         ED Course   Procedures  Labs Reviewed   CBC W/ AUTO DIFFERENTIAL   COMPREHENSIVE METABOLIC PANEL   LIPASE   URINALYSIS   INFLUENZA A AND B ANTIGEN   POCT GLUCOSE MONITORING CONTINUOUS          X-Rays:   Independently Interpreted Readings:   Other Readings:  CXR reviewed. No evidence of focal consolidation, PTX, effusion or cardiomegaly.     Medical Decision Makin-year-old female with history multiple medical problems as above presents to the emergency department complaining of generalized body aches, productive cough, nausea, vomiting for the last week.  On exam she is afebrile with stable vital signs.  She is nontoxic appearing.  Her abdomen is soft nontender without peritoneal signs.  She has had no emesis in the emergency department.  Differential diagnosis includes but is not limited to viral syndrome, influenza, gastroenteritis, gastritis, electrolyte abnormality, dehydration.  I considered but do not suspect perforated viscus, appendicitis, diverticulitis, urinary tract infection, pyelonephritis, sepsis.  Labs are pending at this time.  Chest x-ray is unremarkable as above.  Will give IV fluids as well as Zofran for symptomatic relief.  Patient and her daughter at bedside agree with the plan.    Patient feels much better after receiving IV fluids as well as Zofran.  She is tolerating by mouth without emesis.  Labs and imaging are unremarkable.  Patient and her daughter at bedside requests discharge home.  We'll discharge with Zofran as well as Tessalon Perles.  Patient agrees to follow-up with her primary care  doctor return for any new or worsening complaints.            Scribe Attestation:   Scribe #1: I performed the above scribed service and the documentation accurately describes the services I performed. I attest to the accuracy of the note.    Attending Attestation:           Physician Attestation for Scribe:  Physician Attestation Statement for Scribe #1: I, Nava Melton MD, reviewed documentation, as scribed by Sue Ayala in my presence, and it is both accurate and complete.                 ED Course      Clinical Impression:   The encounter diagnosis was Cough.                           Nava Jacobson MD  12/29/17 [id number]

## 2019-07-29 ENCOUNTER — TELEPHONE (OUTPATIENT)
Dept: ENDOSCOPY | Facility: HOSPITAL | Age: 74
End: 2019-07-29

## 2019-07-29 DIAGNOSIS — Z12.11 SPECIAL SCREENING FOR MALIGNANT NEOPLASMS, COLON: Primary | ICD-10-CM

## 2019-07-29 RX ORDER — POLYETHYLENE GLYCOL 3350, SODIUM SULFATE ANHYDROUS, SODIUM BICARBONATE, SODIUM CHLORIDE, POTASSIUM CHLORIDE 236; 22.74; 6.74; 5.86; 2.97 G/4L; G/4L; G/4L; G/4L; G/4L
4 POWDER, FOR SOLUTION ORAL ONCE
Qty: 4000 ML | Refills: 0 | Status: SHIPPED | OUTPATIENT
Start: 2019-07-29 | End: 2019-07-29

## 2019-08-15 ENCOUNTER — OFFICE VISIT (OUTPATIENT)
Dept: UROLOGY | Facility: CLINIC | Age: 74
End: 2019-08-15
Payer: MEDICARE

## 2019-08-15 VITALS
DIASTOLIC BLOOD PRESSURE: 70 MMHG | WEIGHT: 200 LBS | HEIGHT: 69 IN | SYSTOLIC BLOOD PRESSURE: 118 MMHG | BODY MASS INDEX: 29.62 KG/M2

## 2019-08-15 DIAGNOSIS — R31.0 GROSS HEMATURIA: Primary | ICD-10-CM

## 2019-08-15 PROCEDURE — 99204 PR OFFICE/OUTPT VISIT, NEW, LEVL IV, 45-59 MIN: ICD-10-PCS | Mod: 25,S$GLB,, | Performed by: NURSE PRACTITIONER

## 2019-08-15 PROCEDURE — 3074F PR MOST RECENT SYSTOLIC BLOOD PRESSURE < 130 MM HG: ICD-10-PCS | Mod: CPTII,S$GLB,, | Performed by: NURSE PRACTITIONER

## 2019-08-15 PROCEDURE — 1101F PT FALLS ASSESS-DOCD LE1/YR: CPT | Mod: CPTII,S$GLB,, | Performed by: NURSE PRACTITIONER

## 2019-08-15 PROCEDURE — 87086 URINE CULTURE/COLONY COUNT: CPT

## 2019-08-15 PROCEDURE — 99204 OFFICE O/P NEW MOD 45 MIN: CPT | Mod: 25,S$GLB,, | Performed by: NURSE PRACTITIONER

## 2019-08-15 PROCEDURE — 3074F SYST BP LT 130 MM HG: CPT | Mod: CPTII,S$GLB,, | Performed by: NURSE PRACTITIONER

## 2019-08-15 PROCEDURE — 81001 URINALYSIS AUTO W/SCOPE: CPT | Mod: S$GLB,,, | Performed by: NURSE PRACTITIONER

## 2019-08-15 PROCEDURE — 81001 PR  URINALYSIS, AUTO, W/SCOPE: ICD-10-PCS | Mod: S$GLB,,, | Performed by: NURSE PRACTITIONER

## 2019-08-15 PROCEDURE — 99999 PR PBB SHADOW E&M-EST. PATIENT-LVL III: CPT | Mod: PBBFAC,,, | Performed by: NURSE PRACTITIONER

## 2019-08-15 PROCEDURE — 3078F DIAST BP <80 MM HG: CPT | Mod: CPTII,S$GLB,, | Performed by: NURSE PRACTITIONER

## 2019-08-15 PROCEDURE — 99999 PR PBB SHADOW E&M-EST. PATIENT-LVL III: ICD-10-PCS | Mod: PBBFAC,,, | Performed by: NURSE PRACTITIONER

## 2019-08-15 PROCEDURE — 3078F PR MOST RECENT DIASTOLIC BLOOD PRESSURE < 80 MM HG: ICD-10-PCS | Mod: CPTII,S$GLB,, | Performed by: NURSE PRACTITIONER

## 2019-08-15 PROCEDURE — 1101F PR PT FALLS ASSESS DOC 0-1 FALLS W/OUT INJ PAST YR: ICD-10-PCS | Mod: CPTII,S$GLB,, | Performed by: NURSE PRACTITIONER

## 2019-08-15 NOTE — PROGRESS NOTES
Subjective:       Patient ID: Mariangel Moreau is a 74 y.o. female who is a new patient was self referred for evaluation of hematuria  Chief Complaint:   Chief Complaint   Patient presents with    Other     Pt. was referred by PCP recently because of blood in her urine ..no pain or burning associated ..states she saw blood herself about 2 weeks ago but its not everytime she goes to the bathroom    Other     Pt will bring medications with her on next appt. so the list can be updated        Hematuria  Patient complains of both microscopic and gross hematuria. Reports gross hematuria only with wiping x 1 episode.  Onset of hematuria was a few weeks ago and was sudden in onset. There is not a history of nephrolithiasis. There is not a history of urologic trauma. Other urologic symptoms include urgency. Patient admits to history of tobacco use.  She quit smoking ~ 40 year ago. Patient denies history of chronic Ibrahim catheter,  surgeries, occupational exposure, sexually transmitted diseases, trauma and urolithiasis. Prior workup has been UA'S.  Denies known family history of  malignancy.      ACTIVE MEDICAL ISSUES:  Patient Active Problem List   Diagnosis    Foot pain, bilateral    Fall    Chest pain    Type 2 diabetes mellitus with complication, without long-term current use of insulin    Pure hypercholesterolemia    Sarcoidosis    Neck pain       PAST MEDICAL HISTORY  Past Medical History:   Diagnosis Date    Diabetes mellitus     Hypertension     Sarcoidosis        PAST SURGICAL HISTORY:  Past Surgical History:   Procedure Laterality Date    CHOLECYSTECTOMY         SOCIAL HISTORY:  Social History     Tobacco Use    Smoking status: Never Smoker    Smokeless tobacco: Never Used   Substance Use Topics    Alcohol use: No    Drug use: Not on file       FAMILY HISTORY:  History reviewed. No pertinent family history.    ALLERGIES AND MEDICATIONS: updated and reviewed.  Review of patient's allergies  indicates:   Allergen Reactions    Darvocet a500 [propoxyphene n-acetaminophen] Swelling    Penicillins Swelling    Percocet [oxycodone-acetaminophen]      Current Outpatient Medications   Medication Sig    aspirin (ECOTRIN) 81 MG EC tablet Take 1 tablet (81 mg total) by mouth once daily.    calcium-vitamin D3 500 mg(1,250mg) -200 unit per tablet Take 1 tablet by mouth 2 (two) times daily with meals.    glipiZIDE (GLUCOTROL) 10 MG tablet Take 10 mg by mouth 2 (two) times daily before meals.    glipiZIDE (GLUCOTROL) 10 MG TR24     lisinopril 10 MG tablet Take 1 tablet (10 mg total) by mouth once daily.    lovastatin (MEVACOR) 20 MG tablet Take 1 tablet (20 mg total) by mouth every evening.    meloxicam (MOBIC) 7.5 MG tablet Take 7.5 mg by mouth once daily.    metformin (GLUCOPHAGE-XR) 500 MG 24 hr tablet     multivitamin capsule Take 1 capsule by mouth once daily.    mupirocin (BACTROBAN) 2 % ointment Use qd on wound    ondansetron (ZOFRAN) 4 MG tablet Take 1 tablet (4 mg total) by mouth every 8 (eight) hours as needed.     No current facility-administered medications for this visit.        Review of Systems   Constitutional: Negative for activity change, chills, fatigue, fever and unexpected weight change.   Eyes: Negative for discharge, redness and visual disturbance.   Respiratory: Negative for cough, shortness of breath and wheezing.    Cardiovascular: Negative for chest pain and leg swelling.   Gastrointestinal: Negative for abdominal distention, abdominal pain, constipation, diarrhea, nausea and vomiting.   Genitourinary: Positive for hematuria. Negative for decreased urine volume, difficulty urinating, dysuria, flank pain, frequency, pelvic pain and urgency.   Musculoskeletal: Negative for arthralgias, joint swelling and myalgias.   Skin: Negative for color change and rash.   Neurological: Negative for dizziness and light-headedness.   Psychiatric/Behavioral: Negative for behavioral problems and  "confusion. The patient is not nervous/anxious.        Objective:      Vitals:    08/15/19 1433   BP: 118/70   Weight: 90.7 kg (200 lb)   Height: 5' 9" (1.753 m)     Physical Exam   Constitutional: She is oriented to person, place, and time. She appears well-developed.   HENT:   Head: Normocephalic and atraumatic.   Nose: Nose normal.   Eyes: Conjunctivae are normal. Right eye exhibits no discharge. Left eye exhibits no discharge.   Neck: Normal range of motion. Neck supple. No tracheal deviation present. No thyromegaly present.   Cardiovascular: Normal rate and regular rhythm.    Pulmonary/Chest: Effort normal. No respiratory distress. She has no wheezes.   Abdominal: Soft. She exhibits no distension. There is no hepatosplenomegaly. There is no tenderness. There is no CVA tenderness. No hernia.   Genitourinary:   Genitourinary Comments: Patient declined exam   Musculoskeletal: Normal range of motion. She exhibits no edema.   Neurological: She is alert and oriented to person, place, and time.   Skin: Skin is warm and dry. No rash noted. No erythema.     Psychiatric: She has a normal mood and affect. Her behavior is normal. Judgment normal.       Urine dipstick shows 50 RBCs.      Assessment:       1. Gross hematuria          Plan:       1. Gross hematuria   - Discussed etiology and workup of hematuria   -+history of tobacco use   - Cytology - not indicated  - POCT urinalysis, dipstick or tablet reag  - Basic metabolic panel; Future  - CT Urogram Abd Pelvis W WO; Future  - Urine culture  - Cystoscopy; Future            Follow up in about 6 weeks (around 9/26/2019) for office cystoscopy.  "

## 2019-08-16 LAB
BILIRUB SERPL-MCNC: NORMAL MG/DL
BLOOD URINE, POC: 50
COLOR, POC UA: YELLOW
GLUCOSE UR QL STRIP: NORMAL
KETONES UR QL STRIP: NORMAL
LEUKOCYTE ESTERASE URINE, POC: NORMAL
NITRITE, POC UA: NORMAL
PH, POC UA: 5
PROTEIN, POC: NORMAL
SPECIFIC GRAVITY, POC UA: 1030
UROBILINOGEN, POC UA: NORMAL

## 2019-08-17 LAB — BACTERIA UR CULT: NORMAL

## 2019-09-10 ENCOUNTER — TELEPHONE (OUTPATIENT)
Dept: ENDOSCOPY | Facility: HOSPITAL | Age: 74
End: 2019-09-10

## 2019-09-10 RX ORDER — LORATADINE 10 MG/1
10 TABLET ORAL DAILY
COMMUNITY
End: 2022-03-03

## 2019-09-10 RX ORDER — HYDROCHLOROTHIAZIDE 12.5 MG/1
12.5 CAPSULE ORAL DAILY
COMMUNITY
End: 2022-03-03

## 2019-09-10 RX ORDER — ROSUVASTATIN CALCIUM 20 MG/1
20 TABLET, COATED ORAL DAILY
COMMUNITY
End: 2022-03-03

## 2019-09-10 RX ORDER — ERGOCALCIFEROL 1.25 MG/1
50000 CAPSULE ORAL
COMMUNITY

## 2019-09-10 RX ORDER — LISINOPRIL 40 MG/1
40 TABLET ORAL DAILY
COMMUNITY

## 2019-09-13 ENCOUNTER — HOSPITAL ENCOUNTER (OUTPATIENT)
Facility: HOSPITAL | Age: 74
Discharge: HOME | End: 2019-09-13
Attending: INTERNAL MEDICINE | Admitting: INTERNAL MEDICINE
Payer: MEDICARE

## 2019-09-13 ENCOUNTER — ANESTHESIA EVENT (OUTPATIENT)
Dept: ENDOSCOPY | Facility: HOSPITAL | Age: 74
End: 2019-09-13
Payer: MEDICARE

## 2019-09-13 ENCOUNTER — ANESTHESIA (OUTPATIENT)
Dept: ENDOSCOPY | Facility: HOSPITAL | Age: 74
End: 2019-09-13
Payer: MEDICARE

## 2019-09-13 VITALS
WEIGHT: 180 LBS | HEIGHT: 69 IN | HEART RATE: 65 BPM | TEMPERATURE: 98 F | SYSTOLIC BLOOD PRESSURE: 174 MMHG | BODY MASS INDEX: 26.66 KG/M2 | OXYGEN SATURATION: 100 % | RESPIRATION RATE: 17 BRPM | DIASTOLIC BLOOD PRESSURE: 74 MMHG

## 2019-09-13 DIAGNOSIS — Z12.11 COLON CANCER SCREENING: Primary | ICD-10-CM

## 2019-09-13 DIAGNOSIS — Z12.11 SCREENING FOR COLON CANCER: ICD-10-CM

## 2019-09-13 LAB — POCT GLUCOSE: 95 MG/DL (ref 70–110)

## 2019-09-13 PROCEDURE — 63600175 PHARM REV CODE 636 W HCPCS: Performed by: INTERNAL MEDICINE

## 2019-09-13 PROCEDURE — 27201089 HC SNARE, DISP (ANY): Performed by: INTERNAL MEDICINE

## 2019-09-13 PROCEDURE — 25000003 PHARM REV CODE 250: Performed by: ANESTHESIOLOGY

## 2019-09-13 PROCEDURE — 37000008 HC ANESTHESIA 1ST 15 MINUTES: Performed by: INTERNAL MEDICINE

## 2019-09-13 PROCEDURE — 88305 TISSUE EXAM BY PATHOLOGIST: CPT | Mod: 26,,, | Performed by: PATHOLOGY

## 2019-09-13 PROCEDURE — 37000009 HC ANESTHESIA EA ADD 15 MINS: Performed by: INTERNAL MEDICINE

## 2019-09-13 PROCEDURE — E9220 PRA ENDO ANESTHESIA: HCPCS | Mod: PT,,, | Performed by: NURSE ANESTHETIST, CERTIFIED REGISTERED

## 2019-09-13 PROCEDURE — 63600175 PHARM REV CODE 636 W HCPCS: Performed by: NURSE ANESTHETIST, CERTIFIED REGISTERED

## 2019-09-13 PROCEDURE — 45385 PR COLONOSCOPY,REMV LESN,SNARE: ICD-10-PCS | Mod: PT,,, | Performed by: INTERNAL MEDICINE

## 2019-09-13 PROCEDURE — 88305 TISSUE EXAM BY PATHOLOGIST: CPT | Mod: 59 | Performed by: PATHOLOGY

## 2019-09-13 PROCEDURE — 88305 TISSUE SPECIMEN TO PATHOLOGY - SURGERY: ICD-10-PCS | Mod: 26,,, | Performed by: PATHOLOGY

## 2019-09-13 PROCEDURE — 45385 COLONOSCOPY W/LESION REMOVAL: CPT | Mod: PT,,, | Performed by: INTERNAL MEDICINE

## 2019-09-13 PROCEDURE — E9220 PRA ENDO ANESTHESIA: ICD-10-PCS | Mod: PT,,, | Performed by: NURSE ANESTHETIST, CERTIFIED REGISTERED

## 2019-09-13 PROCEDURE — 45385 COLONOSCOPY W/LESION REMOVAL: CPT | Performed by: INTERNAL MEDICINE

## 2019-09-13 RX ORDER — SODIUM CHLORIDE 9 MG/ML
INJECTION, SOLUTION INTRAVENOUS CONTINUOUS
Status: DISCONTINUED | OUTPATIENT
Start: 2019-09-13 | End: 2019-09-13 | Stop reason: HOSPADM

## 2019-09-13 RX ORDER — PROPOFOL 10 MG/ML
VIAL (ML) INTRAVENOUS
Status: DISCONTINUED | OUTPATIENT
Start: 2019-09-13 | End: 2019-09-13

## 2019-09-13 RX ORDER — LIDOCAINE HCL/PF 100 MG/5ML
SYRINGE (ML) INTRAVENOUS
Status: DISCONTINUED | OUTPATIENT
Start: 2019-09-13 | End: 2019-09-13

## 2019-09-13 RX ORDER — LISINOPRIL 40 MG/1
40 TABLET ORAL ONCE
Status: COMPLETED | OUTPATIENT
Start: 2019-09-13 | End: 2019-09-13

## 2019-09-13 RX ADMIN — LIDOCAINE HYDROCHLORIDE 50 MG: 20 INJECTION, SOLUTION INTRAVENOUS at 10:09

## 2019-09-13 RX ADMIN — PROPOFOL 50 MG: 10 INJECTION, EMULSION INTRAVENOUS at 10:09

## 2019-09-13 RX ADMIN — PROPOFOL 100 MG: 10 INJECTION, EMULSION INTRAVENOUS at 10:09

## 2019-09-13 RX ADMIN — LISINOPRIL 40 MG: 40 TABLET ORAL at 11:09

## 2019-09-13 RX ADMIN — SODIUM CHLORIDE: 0.9 INJECTION, SOLUTION INTRAVENOUS at 09:09

## 2019-09-13 NOTE — DISCHARGE INSTRUCTIONS

## 2019-09-13 NOTE — ANESTHESIA POSTPROCEDURE EVALUATION
Anesthesia Post Evaluation    Patient: Mariangel Moreau    Procedure(s) Performed: Procedure(s) (LRB):  COLONOSCOPY (N/A)    Final Anesthesia Type: general  Patient location during evaluation: PACU  Patient participation: Yes- Able to Participate  Level of consciousness: awake and alert and oriented  Post-procedure vital signs: reviewed and stable  Pain management: adequate  Airway patency: patent  PONV status at discharge: No PONV  Anesthetic complications: no      Cardiovascular status: stable  Respiratory status: unassisted, spontaneous ventilation and room air  Hydration status: euvolemic  Follow-up not needed.          Vitals Value Taken Time   /74 9/13/2019 11:32 AM   Temp 36.6 °C (97.9 °F) 9/13/2019 11:03 AM   Pulse 65 9/13/2019 11:32 AM   Resp 17 9/13/2019 11:32 AM   SpO2 100 % 9/13/2019 11:32 AM         Event Time     Out of Recovery 11:33:55          Pain/Rachana Score: Rachana Score: 10 (9/13/2019 11:33 AM)

## 2019-09-13 NOTE — H&P
Short Stay Endoscopy History and Physical    PCP - Jorge Sahni NP    Procedure - Colonoscopy  Sedation: GA  ASA - per anesthesia  Mallampati - per anesthesia  History of Anesthesia problems - no  Family history Anesthesia problems -  no     HPI:  This is a 74 y.o. female here for evaluation of : Screening for CRC    Reflux - no  Dysphagia - no  Abdominal pain - no  Diarrhea - no    ROS:  Constitutional: No fevers, chills, No weight loss  ENT: No allergies  CV: No chest pain  Pulm: No cough, No shortness of breath  Ophtho: No vision changes  GI: see HPI  Medical History:  has a past medical history of Aortic atherosclerosis, Diabetes mellitus, High cholesterol, Hypertension, and Sarcoidosis.    Surgical History:  has a past surgical history that includes Cholecystectomy.    Family History: family history is not on file.. Otherwise no colon cancer, inflammatory bowel disease, or GI malignancies.    Social History:  reports that she has never smoked. She has never used smokeless tobacco. She reports that she does not drink alcohol.    Review of patient's allergies indicates:   Allergen Reactions    Darvocet a500 [propoxyphene n-acetaminophen] Swelling    Penicillins Swelling    Percocet [oxycodone-acetaminophen]        Medications:   Medications Prior to Admission   Medication Sig Dispense Refill Last Dose    aspirin (ECOTRIN) 81 MG EC tablet Take 1 tablet (81 mg total) by mouth once daily. 90 tablet 3 Taking    ergocalciferol (ERGOCALCIFEROL) 50,000 unit Cap Take 50,000 Units by mouth every 7 days.       glipiZIDE (GLUCOTROL) 10 MG TR24    Taking    hydroCHLOROthiazide (MICROZIDE) 12.5 mg capsule Take 12.5 mg by mouth once daily.       lisinopril (PRINIVIL,ZESTRIL) 40 MG tablet Take 40 mg by mouth once daily.       loratadine (CLARITIN) 10 mg tablet Take 10 mg by mouth once daily.       meloxicam (MOBIC) 7.5 MG tablet Take 7.5 mg by mouth once daily.   Taking    metformin (GLUCOPHAGE-XR) 500 MG 24 hr  tablet    Taking    multivitamin capsule Take 1 capsule by mouth once daily.   Taking    mupirocin (BACTROBAN) 2 % ointment Use qd on wound 30 g 2 Taking    ondansetron (ZOFRAN) 4 MG tablet Take 1 tablet (4 mg total) by mouth every 8 (eight) hours as needed. 30 tablet 0 Taking    rosuvastatin (CRESTOR) 20 MG tablet Take 20 mg by mouth once daily.          Objective Findings:    Vital Signs: Per nursing notes.    Physical Exam:  General Appearance: Well appearing in no acute distress  Head:   Normocephalic, without obvious abnormality  Eyes:    No scleral icterus  Airway: Open  Neck: No restriction in mobility  Lungs: CTA bilaterally in anterior and posterior fields, no wheezes, no crackles.  Heart:  Regular rate and rhythm, S1, S2 normal, no murmurs heard  Abdomen: Soft, non tender, non distended      Labs:  Lab Results   Component Value Date    WBC 6.82 12/29/2017    HGB 11.3 (L) 12/29/2017    HCT 36.7 (L) 12/29/2017     12/29/2017    ALT 30 12/29/2017    AST 33 12/29/2017     12/29/2017    K 4.1 12/29/2017    CL 98 12/29/2017    CREATININE 0.9 12/29/2017    BUN 21 12/29/2017    CO2 28 12/29/2017    INR 1.0 03/21/2017         I have explained the risks and benefits of endoscopy procedures to the patient including but not limited to bleeding, perforation, infection, and death.    Thank you so much for allowing me to participate in the care of Mariangel Moreau    Miguelito Garcia MD

## 2019-09-13 NOTE — TRANSFER OF CARE
"Anesthesia Transfer of Care Note    Patient: Mariangel Moreau    Procedure(s) Performed: Procedure(s) (LRB):  COLONOSCOPY (N/A)    Patient location: PACU    Anesthesia Type: general    Transport from OR: Transported from OR on room air with adequate spontaneous ventilation    Post pain: adequate analgesia    Post assessment: no apparent anesthetic complications and tolerated procedure well    Post vital signs: stable    Level of consciousness: sedated    Nausea/Vomiting: no nausea/vomiting    Complications: none    Transfer of care protocol was followed      Last vitals:   Visit Vitals  BP (!) 217/90   Pulse 72   Temp 36.9 °C (98.4 °F)   Resp 15   Ht 5' 9" (1.753 m)   Wt 81.6 kg (180 lb)   SpO2 99%   BMI 26.58 kg/m²     "

## 2019-09-13 NOTE — [employer] PATIENT INSTRUCTIONS
Discharge Summary/Instructions after an Endoscopic Procedure  Patient Name: Mariangel Moreau  Patient MRN: [id number]  Patient YOB: 1945 Friday, September 13, 2019  Miguelito Garcia MD  RESTRICTIONS:  During your procedure today, you received medications for sedation.  These   medications may affect your judgment, balance and coordination.  Therefore,   for 24 hours, you have the following restrictions:   - DO NOT drive a car, operate machinery, make legal/financial decisions,   sign important papers or drink alcohol.    ACTIVITY:  Today: no heavy lifting, straining or running due to procedural   sedation/anesthesia.  The following day: return to full activity including work.  DIET:  Eat and drink normally unless instructed otherwise.     TREATMENT FOR COMMON SIDE EFFECTS:  - Mild abdominal pain, nausea, belching, bloating or excessive gas:  rest,   eat lightly and use a heating pad.  - Sore Throat: treat with throat lozenges and/or gargle with warm salt   water.  - Because air was used during the procedure, expelling large amounts of air   from your rectum or belching is normal.  - If a bowel prep was taken, you may not have a bowel movement for 1-3 days.    This is normal.  SYMPTOMS TO WATCH FOR AND REPORT TO YOUR PHYSICIAN:  1. Abdominal pain or bloating, other than gas cramps.  2. Chest pain.  3. Back pain.  4. Signs of infection such as: chills or fever occurring within 24 hours   after the procedure.  5. Rectal bleeding, which would show as bright red, maroon, or black stools.   (A tablespoon of blood from the rectum is not serious, especially if   hemorrhoids are present.)  6. Vomiting.  7. Weakness or dizziness.  GO DIRECTLY TO THE NEAREST EMERGENCY ROOM IF YOU HAVE ANY OF THE FOLLOWING:      Difficulty breathing              Chills and/or fever over 101 F   Persistent vomiting and/or vomiting blood   Severe abdominal pain   Severe chest pain   Black, tarry stools   Bleeding- more than one  tablespoon   Any other symptom or condition that you feel may need urgent attention  Your doctor recommends these additional instructions:  If any biopsies were taken, your doctors clinic will contact you in 1 to 2   weeks with any results.  - Patient has a contact number available for emergencies.  The signs and   symptoms of potential delayed complications were discussed with the   patient.  Return to normal activities tomorrow.  Written discharge   instructions were provided to the patient.   - Discharge patient to home.   - Resume previous diet.   - Continue present medications.   - Await pathology results.   - Repeat colonoscopy in 5 years for surveillance.   For questions, problems or results please call your physician - Miguelito Garcia MD at Work:  [phone].  [facility], EMERGENCY ROOM PHONE NUMBER: [phone]  IF A COMPLICATION OR EMERGENCY SITUATION ARISES AND YOU ARE UNABLE TO REACH   YOUR PHYSICIAN - GO DIRECTLY TO THE EMERGENCY ROOM.  Miguelito Garcia MD  9/13/2019 11:01:59 AM  This report has been verified and signed electronically.  [employer]

## 2019-09-13 NOTE — ANESTHESIA PREPROCEDURE EVALUATION
"                                                                                                             09/13/2019  Mariangel Moreau is a 74 y.o., female.    Procedure: COLONOSCOPY (N/A Abdomen) - Referral from SABRINA Gonzalez, [facility]   Ht: 5'9"  Wt: 180 lbs BMI: 26.2   Anesthesia type: General   Diagnosis: Special screening for malignant neoplasms, colon [Z12.11]         Pre-operative evaluation for Procedure(s) (LRB):  COLONOSCOPY (N/A)    Encounter Diagnoses   Name Primary?    Colon cancer screening Yes    Screening for colon cancer        Review of patient's allergies indicates:   Allergen Reactions    Darvocet a500 [propoxyphene n-acetaminophen] Swelling    Penicillins Swelling    Percocet [oxycodone-acetaminophen]        No current facility-administered medications on file prior to encounter.      Current Outpatient Medications on File Prior to Encounter   Medication Sig Dispense Refill    aspirin (ECOTRIN) 81 MG EC tablet Take 1 tablet (81 mg total) by mouth once daily. 90 tablet 3    glipiZIDE (GLUCOTROL) 10 MG TR24       meloxicam (MOBIC) 7.5 MG tablet Take 7.5 mg by mouth once daily.      metformin (GLUCOPHAGE-XR) 500 MG 24 hr tablet       multivitamin capsule Take 1 capsule by mouth once daily.      mupirocin (BACTROBAN) 2 % ointment Use qd on wound 30 g 2    ondansetron (ZOFRAN) 4 MG tablet Take 1 tablet (4 mg total) by mouth every 8 (eight) hours as needed. 30 tablet 0       Social History     Tobacco Use   Smoking Status Never Smoker   Smokeless Tobacco Never Used       Social History     Substance and Sexual Activity   Alcohol Use No       Patient Active Problem List   Diagnosis    Foot pain, bilateral    Fall    Chest pain    Type 2 diabetes mellitus with complication, without long-term current use of insulin    Pure hypercholesterolemia    Sarcoidosis    Neck pain    Screening for colon cancer       Past Surgical History:   Procedure Laterality Date    CHOLECYSTECTOMY   "           No results for input(s): HCT in the last 72 hours.  No results for input(s): PLT in the last 72 hours.  No results for input(s): K in the last 72 hours.  No results for input(s): CREATININE in the last 72 hours.  No results for input(s): GLU in the last 72 hours.  No results for input(s): PT in the last 72 hours.                    Anesthesia Evaluation         Review of Systems  Anesthesia Hx:  No problems with previous Anesthesia   Hematology/Oncology:  Hematology Normal   Oncology Normal     Cardiovascular:   Hypertension, well controlled Denies MI.    Denies Angina. 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  Denies Asthma.  Denies Shortness of breath.    Renal/:   Denies Chronic Renal Disease.     Hepatic/GI:   Denies Liver Disease.    Neurological:   Denies TIA. Denies CVA. Denies Seizures.    Endocrine:   Diabetes, type 2        Physical Exam  General:  Well nourished    Airway/Jaw/Neck:  Airway Findings: Mouth Opening: Normal Tongue: Normal  General Airway Assessment: Adult, Average  Mallampati: II  TM Distance: Normal, at least 6 cm  Jaw/Neck Findings:  Neck ROM: Normal ROM  Full upper and lower          Mental Status:  Mental Status Findings:  Cooperative, Alert and Oriented         Anesthesia Plan  Type of Anesthesia, risks & benefits discussed:  Anesthesia Type:  general  Patient's Preference:   Intra-op Monitoring Plan:   Intra-op Monitoring Plan Comments:   Post Op Pain Control Plan:   Post Op Pain Control Plan Comments: As per surgeon's plan  Induction:   IV  Beta Blocker:  Patient is not currently on a Beta-Blocker (No further documentation required).       Informed Consent: Patient understands risks and agrees with Anesthesia plan.  Questions answered. Anesthesia consent signed with patient.  ASA Score: 2     Day of Surgery Review of History & Physical:    H&P update referred to the surgeon.         Ready For Surgery From Anesthesia Perspective.      Vitals - 1 value per visit 3/21/2017 3/28/2017 4/21/2017 6/17/2017   SYSTOLIC 198 189 191 119   DIASTOLIC 81 84 80 67   PULSE 80 69 68 90     Vitals - 1 value per visit 6/19/2017 6/21/2017 12/11/2017 12/29/2017   SYSTOLIC 129  138 136   DIASTOLIC 61  63 65   PULSE 69  66 90     Vitals - 1 value per visit 8/15/2019 9/13/2019   SYSTOLIC 118 217   DIASTOLIC 70 90   PULSE  72

## 2019-09-17 ENCOUNTER — TELEPHONE (OUTPATIENT)
Dept: GASTROENTEROLOGY | Facility: CLINIC | Age: 74
End: 2019-09-17

## 2019-09-17 NOTE — TELEPHONE ENCOUNTER
----- Message from Miguelito Garcia MD sent at 9/17/2019  9:18 AM CDT -----  Please notify patient, the colon polyps were benign.

## 2019-09-20 ENCOUNTER — TELEPHONE (OUTPATIENT)
Dept: ENDOSCOPY | Facility: HOSPITAL | Age: 74
End: 2019-09-20

## 2019-10-21 ENCOUNTER — HOSPITAL ENCOUNTER (EMERGENCY)
Facility: HOSPITAL | Age: 74
Discharge: HOME | End: 2019-10-21
Attending: EMERGENCY MEDICINE
Payer: MEDICARE

## 2019-10-21 VITALS
SYSTOLIC BLOOD PRESSURE: 183 MMHG | RESPIRATION RATE: 18 BRPM | TEMPERATURE: 99 F | HEART RATE: 71 BPM | OXYGEN SATURATION: 100 % | HEIGHT: 69 IN | DIASTOLIC BLOOD PRESSURE: 75 MMHG | WEIGHT: 180 LBS | BODY MASS INDEX: 26.66 KG/M2

## 2019-10-21 DIAGNOSIS — S80.01XA CONTUSION OF RIGHT KNEE, INITIAL ENCOUNTER: Primary | ICD-10-CM

## 2019-10-21 DIAGNOSIS — S93.601A RIGHT FOOT SPRAIN, INITIAL ENCOUNTER: ICD-10-CM

## 2019-10-21 DIAGNOSIS — R52 PAIN: ICD-10-CM

## 2019-10-21 PROCEDURE — 99284 EMERGENCY DEPT VISIT MOD MDM: CPT | Mod: 25

## 2019-10-21 PROCEDURE — 25000003 PHARM REV CODE 250: Performed by: PHYSICIAN ASSISTANT

## 2019-10-21 RX ORDER — DICLOFENAC SODIUM 10 MG/G
2 GEL TOPICAL 4 TIMES DAILY PRN
Qty: 100 G | Refills: 0 | Status: SHIPPED | OUTPATIENT
Start: 2019-10-21 | End: 2019-10-31

## 2019-10-21 RX ORDER — ACETAMINOPHEN 325 MG/1
650 TABLET ORAL
Status: COMPLETED | OUTPATIENT
Start: 2019-10-21 | End: 2019-10-21

## 2019-10-21 RX ADMIN — ACETAMINOPHEN 650 MG: 325 TABLET, FILM COATED ORAL at 11:10

## 2019-10-21 NOTE — ED PROVIDER NOTES
"Encounter Date: 10/21/2019    SCRIBE #1 NOTE: I, Ema Tovar, am scribing for, and in the presence of,  Silvio Laboy PA-C. I have scribed the following portions of the note - Other sections scribed: HPI and ROS.       History     Chief Complaint   Patient presents with    Knee Pain     pt fell saturday and c/o R knee pain and R foot pain. denies hitting head. states she slipped and fell. denies dizziness or weakness.      CC: Knee Pain    HPI: This 74 y.o female, with a medical history of aortic atherosclerosis, diabetes mellitus, high cholesterol, hypertension, and sarcoidosis, presents to the ED accompanied by her daughter c/o right knee pain (with swelling) and right foot pain s/p a mechanical fall that occurred on Saturday. Pt reports that her right foot got caught causing her to fall onto the right foot and right knee. She states that she has since been experiencing pain to the anterior right knee (with swelling) as well as to the top of the right foot. The symptoms are acute, constant and moderate (4/10). She notes taking Ibuprofen for treatment. Pt denies head trauma and numbness. No other associated symptoms. No alleviating factors.     The history is provided by the patient. No  was used.     Review of patient's allergies indicates:   Allergen Reactions    Darvocet a500 [propoxyphene n-acetaminophen] Swelling    Penicillins Swelling    Percocet [oxycodone-acetaminophen]      Past Medical History:   Diagnosis Date    Aortic atherosclerosis     Diabetes mellitus     High cholesterol     Hypertension     Sarcoidosis      Past Surgical History:   Procedure Laterality Date    CHOLECYSTECTOMY      COLONOSCOPY N/A 9/13/2019    Procedure: COLONOSCOPY;  Surgeon: Miguelito Garcia MD;  Location: [facility] ([address] [place] [address]);  Service: Endoscopy;  Laterality: N/A;  Referral from SABRINA Gonzalez, [facility]  Ht: 5'9"  Wt: 180 lbs BMI: 26.2     No family history on file.  Social History     Tobacco Use "    Smoking status: Never Smoker    Smokeless tobacco: Never Used   Substance Use Topics    Alcohol use: No    Drug use: Not on file     Review of Systems   Constitutional: Negative for fever.   HENT: Negative for sore throat.    Respiratory: Negative for shortness of breath.    Cardiovascular: Negative for chest pain.   Gastrointestinal: Negative for nausea.   Genitourinary: Negative for dysuria.   Musculoskeletal: Positive for arthralgias (anterior right knee pain) and joint swelling (to the right knee). Negative for back pain.        (+) right foot pain   Skin: Negative for rash.   Neurological: Negative for weakness and numbness.       Physical Exam     Initial Vitals [10/21/19 1040]   BP Pulse Resp Temp SpO2   (!) 163/68 75 18 98.1 °F (36.7 °C) 100 %      MAP       --         Physical Exam    Vitals reviewed.  Constitutional: She appears well-developed and well-nourished. She is not diaphoretic. No distress.   HENT:   Head: Normocephalic and atraumatic.   Right Ear: External ear normal.   Left Ear: External ear normal.   Nose: Nose normal.   Eyes: Conjunctivae are normal. No scleral icterus.   Neck: Normal range of motion. Neck supple.   Cardiovascular: Normal rate, regular rhythm and intact distal pulses.   Pulmonary/Chest: No respiratory distress.   Musculoskeletal: Normal range of motion. She exhibits edema and tenderness.   Tenderness and ecchymosis to the medial right knee with no deformity or laxity.  Leg compartments are soft and nontender. Mild tenderness over the dorsal right foot over the navicular.  No ankle tenderness. No metatarsal tenderness.   Neurological: She is alert and oriented to person, place, and time. No sensory deficit.   Skin: Skin is warm and dry.         ED Course   Procedures  Labs Reviewed - No data to display       Imaging Results          X-Ray Knee 3 View Right (Final result)  Result time 10/21/19 11:21:49    Final result by Judson Yates MD (10/21/19 11:21:49)                  Impression:      Mild degenerative changes.    Chondrocalcinosis.    Probable small suprapatellar joint effusion.      Electronically signed by: Judson Yates MD  Date:    10/21/2019  Time:    11:21             Narrative:    EXAMINATION:  XR KNEE 3 VIEW RIGHT    CLINICAL HISTORY:  Pain, unspecified    TECHNIQUE:  AP, lateral, and Merchant views of the right knee were performed.    COMPARISON:  None    FINDINGS:  The bones are intact.  There is no evidence for acute fracture or bone destruction.  There are degenerative changes with small osteophytes of the femorotibial and patellofemoral joints.  There appears to be minimal narrowing of the medial compartment of the femorotibial joint.  There is chondrocalcinosis present.  There is soft tissue prominence in the suprapatellar location and a small joint effusion is suspected.                                X-Ray Foot Complete Right (Final result)  Result time 10/21/19 11:26:03    Final result by Louis Smallwood MD (10/21/19 11:26:03)                 Impression:      Mild deformity of the proximal phalanx of the right 4th toe which may represent an old healed fracture.  No definite evidence of acute fracture or dislocation.  If symptoms persist, follow-up radiographs in 10-14 days may be helpful for further evaluation.    This report was flagged in Epic as abnormal.      Electronically signed by: Louis Smallwood MD  Date:    10/21/2019  Time:    11:26             Narrative:    EXAMINATION:  XR FOOT COMPLETE 3 VIEW RIGHT    CLINICAL HISTORY:  . Pain, unspecified    TECHNIQUE:  AP, lateral, and oblique views of the right foot were performed.    COMPARISON:  None    FINDINGS:  Generalized osteopenia.  Alignment is satisfactory.  Suggestion of some mild deformity of the proximal phalanx of the 4th digit, best appreciated on the oblique view.  Findings may represent an old healed fracture.  No definite evidence of acute fracture or dislocation.  Degenerative changes are  present.  Prominent calcaneal spurs at the insertions of the Achilles tendon and plantar fascia.  Some spurring noted at the dorsal aspect of the midfoot.                              X-Rays:   Independently Interpreted Readings:   Other Readings:  X-ray right knee with small suprapatellar effusion and degenerative changes.  No evidence of fracture.  X-ray right foot with possible old fracture of the 4th toe.    Medical Decision Making:   ED Management:  74-year-old female with history and exam concerning for right knee and right foot contusions.  She is ambulatory.  No evidence of infection, compartment syndrome, DVT, or Achilles tendon rupture.  X-rays negative for acute fracture.  X-ray of the right foot with possible old fracture of the 4th toe.  She has no tenderness here, and this is not acute finding.  Will treat with Tylenol and topical NSAID and have patient follow up with Orthopedics and/or PCP.                      Clinical Impression:       ICD-10-CM ICD-9-CM   1. Contusion of right knee, initial encounter S80.01XA 924.11   2. Pain R52 780.96   3. Right foot sprain, initial encounter S93.601A 845.10            Scribe attestation: I, Silvio Laboy, personally performed the services described in this documentation. All medical record entries made by the scribe were at my direction and in my presence. I have reviewed the chart and agree that the record reflects my personal performance and is accurate and complete.                    Silvio Laboy PA-C  10/21/19 2928

## 2019-11-11 ENCOUNTER — HOSPITAL ENCOUNTER (OUTPATIENT)
Dept: RADIOLOGY | Facility: HOSPITAL | Age: 74
Discharge: HOME | End: 2019-11-11
Attending: NURSE PRACTITIONER
Payer: MEDICARE

## 2019-11-11 ENCOUNTER — PROCEDURE VISIT (OUTPATIENT)
Dept: UROLOGY | Facility: CLINIC | Age: 74
End: 2019-11-11
Payer: MEDICARE

## 2019-11-11 VITALS
WEIGHT: 185.63 LBS | TEMPERATURE: 99 F | HEIGHT: 69 IN | BODY MASS INDEX: 27.49 KG/M2 | HEART RATE: 70 BPM | DIASTOLIC BLOOD PRESSURE: 82 MMHG | SYSTOLIC BLOOD PRESSURE: 142 MMHG | RESPIRATION RATE: 16 BRPM

## 2019-11-11 DIAGNOSIS — R31.0 GROSS HEMATURIA: ICD-10-CM

## 2019-11-11 LAB
BILIRUB SERPL-MCNC: NEGATIVE MG/DL
BLOOD URINE, POC: NORMAL
COLOR, POC UA: YELLOW
GLUCOSE UR QL STRIP: NORMAL
KETONES UR QL STRIP: NEGATIVE
LEUKOCYTE ESTERASE URINE, POC: NEGATIVE
NITRITE, POC UA: NEGATIVE
PH, POC UA: 5
PROTEIN, POC: NEGATIVE
SPECIFIC GRAVITY, POC UA: 1000
UROBILINOGEN, POC UA: NORMAL

## 2019-11-11 PROCEDURE — 52000 CYSTOURETHROSCOPY: CPT | Mod: S$GLB,,, | Performed by: UROLOGY

## 2019-11-11 PROCEDURE — 74178 CT ABD&PLV WO CNTR FLWD CNTR: CPT | Mod: 26,,, | Performed by: RADIOLOGY

## 2019-11-11 PROCEDURE — 81002 POCT URINE DIPSTICK WITHOUT MICROSCOPE: ICD-10-PCS | Mod: S$GLB,,, | Performed by: UROLOGY

## 2019-11-11 PROCEDURE — 74178 CT UROGRAM ABD PELVIS W WO: ICD-10-PCS | Mod: 26,,, | Performed by: RADIOLOGY

## 2019-11-11 PROCEDURE — 81002 URINALYSIS NONAUTO W/O SCOPE: CPT | Mod: S$GLB,,, | Performed by: UROLOGY

## 2019-11-11 PROCEDURE — 25500020 PHARM REV CODE 255: Performed by: NURSE PRACTITIONER

## 2019-11-11 PROCEDURE — 74178 CT ABD&PLV WO CNTR FLWD CNTR: CPT | Mod: TC

## 2019-11-11 PROCEDURE — 52000 CYSTOSCOPY: ICD-10-PCS | Mod: S$GLB,,, | Performed by: UROLOGY

## 2019-11-11 RX ORDER — AMLODIPINE BESYLATE 5 MG/1
5 TABLET ORAL DAILY
Refills: 0 | COMMUNITY
Start: 2019-10-30 | End: 2022-03-03

## 2019-11-11 RX ADMIN — IOHEXOL 125 ML: 350 INJECTION, SOLUTION INTRAVENOUS at 09:11

## 2019-11-11 NOTE — PROCEDURES
"Cystoscopy  Date/Time: 11/11/2019 11:00 AM  Performed by: Cassandra Lay MD  Authorized by: Jayashree Zhang NP     Consent Done?:  Yes (Written)  Time out: Immediately prior to procedure a "time out" was called to verify the correct patient, procedure, equipment, support staff and site/side marked as required.    Indications: hematuria    Position:  Dorsal lithotomy  Anesthesia:  Lidocaine jelly  Patient sedated?: No    Preparation: Patient was prepped and draped in usual sterile fashion      Scope type:  Flexible cystoscope  Stent inserted: No    Stent removed: No    External exam normal: Yes    Digital exam performed: No    Urethra normal: Yes  Bladder neck normal: Bladder neck normal   Bladder normal: Yes      Patient tolerance:  Patient tolerated the procedure well with no immediate complications     Cystoscopy normal.  CT uro with small pulmonary nodules, discussed with pt. Recommend f/u with PCP.       "

## 2022-02-14 ENCOUNTER — TELEPHONE (OUTPATIENT)
Dept: SURGERY | Facility: CLINIC | Age: 77
End: 2022-02-14
Payer: MEDICARE

## 2022-02-14 NOTE — TELEPHONE ENCOUNTER
Spoke with Mercedes regarding referral. infor her it was never received, gave correct fax number.She state she ill be faxing it over soon.      ----- Message from Jesus Duong sent at 2/14/2022  1:13 PM CST -----  Type: Patient Call Back    Who called: Mercedes Blackmon    What is the request in detail: She faxed over a referral to schedule an appt, would like to know if fax was received.    Can the clinic reply by [facility]? no    Would the patient rather a call back or a response via [facility]? Call back    Best call back number: [phone]

## 2022-02-17 ENCOUNTER — OFFICE VISIT (OUTPATIENT)
Dept: SURGERY | Facility: CLINIC | Age: 77
End: 2022-02-17
Payer: MEDICARE

## 2022-02-17 VITALS
DIASTOLIC BLOOD PRESSURE: 73 MMHG | SYSTOLIC BLOOD PRESSURE: 168 MMHG | HEIGHT: 69 IN | BODY MASS INDEX: 28.24 KG/M2 | WEIGHT: 190.69 LBS

## 2022-02-17 DIAGNOSIS — R22.31 ARM MASS, RIGHT: Primary | ICD-10-CM

## 2022-02-17 PROCEDURE — 1159F PR MEDICATION LIST DOCUMENTED IN MEDICAL RECORD: ICD-10-PCS | Mod: CPTII,S$GLB,, | Performed by: SURGERY

## 2022-02-17 PROCEDURE — 1125F AMNT PAIN NOTED PAIN PRSNT: CPT | Mod: CPTII,S$GLB,, | Performed by: SURGERY

## 2022-02-17 PROCEDURE — 99204 OFFICE O/P NEW MOD 45 MIN: CPT | Mod: S$GLB,,, | Performed by: SURGERY

## 2022-02-17 PROCEDURE — 3008F PR BODY MASS INDEX (BMI) DOCUMENTED: ICD-10-PCS | Mod: CPTII,S$GLB,, | Performed by: SURGERY

## 2022-02-17 PROCEDURE — 3077F SYST BP >= 140 MM HG: CPT | Mod: CPTII,S$GLB,, | Performed by: SURGERY

## 2022-02-17 PROCEDURE — 3078F DIAST BP <80 MM HG: CPT | Mod: CPTII,S$GLB,, | Performed by: SURGERY

## 2022-02-17 PROCEDURE — 3077F PR MOST RECENT SYSTOLIC BLOOD PRESSURE >= 140 MM HG: ICD-10-PCS | Mod: CPTII,S$GLB,, | Performed by: SURGERY

## 2022-02-17 PROCEDURE — 3078F PR MOST RECENT DIASTOLIC BLOOD PRESSURE < 80 MM HG: ICD-10-PCS | Mod: CPTII,S$GLB,, | Performed by: SURGERY

## 2022-02-17 PROCEDURE — 3288F PR FALLS RISK ASSESSMENT DOCUMENTED: ICD-10-PCS | Mod: CPTII,S$GLB,, | Performed by: SURGERY

## 2022-02-17 PROCEDURE — 3288F FALL RISK ASSESSMENT DOCD: CPT | Mod: CPTII,S$GLB,, | Performed by: SURGERY

## 2022-02-17 PROCEDURE — 1101F PR PT FALLS ASSESS DOC 0-1 FALLS W/OUT INJ PAST YR: ICD-10-PCS | Mod: CPTII,S$GLB,, | Performed by: SURGERY

## 2022-02-17 PROCEDURE — 99204 PR OFFICE/OUTPT VISIT, NEW, LEVL IV, 45-59 MIN: ICD-10-PCS | Mod: S$GLB,,, | Performed by: SURGERY

## 2022-02-17 PROCEDURE — 1125F PR PAIN SEVERITY QUANTIFIED, PAIN PRESENT: ICD-10-PCS | Mod: CPTII,S$GLB,, | Performed by: SURGERY

## 2022-02-17 PROCEDURE — 3008F BODY MASS INDEX DOCD: CPT | Mod: CPTII,S$GLB,, | Performed by: SURGERY

## 2022-02-17 PROCEDURE — 1159F MED LIST DOCD IN RCRD: CPT | Mod: CPTII,S$GLB,, | Performed by: SURGERY

## 2022-02-17 PROCEDURE — 1101F PT FALLS ASSESS-DOCD LE1/YR: CPT | Mod: CPTII,S$GLB,, | Performed by: SURGERY

## 2022-02-17 RX ORDER — SODIUM CHLORIDE 9 MG/ML
INJECTION, SOLUTION INTRAVENOUS CONTINUOUS
Status: CANCELLED | OUTPATIENT
Start: 2022-02-17

## 2022-02-17 RX ORDER — MUPIROCIN 20 MG/G
OINTMENT TOPICAL
Status: CANCELLED | OUTPATIENT
Start: 2022-02-17

## 2022-02-17 NOTE — H&P (VIEW-ONLY)
History & Physical    SUBJECTIVE:     History of Present Illness:  Patient is a 76 y.o. female presents with right upper extremity mass for year that has increased in size recently. It is more symptomatic    Chief Complaint   Patient presents with    Consult     Cyst on right upper arm       Review of patient's allergies indicates:   Allergen Reactions    Darvocet a500 [propoxyphene n-acetaminophen] Swelling    Penicillins Swelling    Percocet [oxycodone-acetaminophen]        Current Outpatient Medications   Medication Sig Dispense Refill    amLODIPine (NORVASC) 5 MG tablet Take 5 mg by mouth once daily.  0    aspirin (ECOTRIN) 81 MG EC tablet Take 1 tablet (81 mg total) by mouth once daily. 90 tablet 3    ergocalciferol (ERGOCALCIFEROL) 50,000 unit Cap Take 50,000 Units by mouth every 7 days.      glipiZIDE (GLUCOTROL) 10 MG TR24       hydroCHLOROthiazide (MICROZIDE) 12.5 mg capsule Take 12.5 mg by mouth once daily.      lisinopril (PRINIVIL,ZESTRIL) 40 MG tablet Take 40 mg by mouth once daily.      loratadine (CLARITIN) 10 mg tablet Take 10 mg by mouth once daily.      meloxicam (MOBIC) 7.5 MG tablet Take 7.5 mg by mouth once daily.      metformin (GLUCOPHAGE-XR) 500 MG 24 hr tablet       multivitamin capsule Take 1 capsule by mouth once daily.      mupirocin (BACTROBAN) 2 % ointment Use qd on wound 30 g 2    ondansetron (ZOFRAN) 4 MG tablet Take 1 tablet (4 mg total) by mouth every 8 (eight) hours as needed. 30 tablet 0    rosuvastatin (CRESTOR) 20 MG tablet Take 20 mg by mouth once daily.      diclofenac sodium (VOLTAREN) 1 % Gel Apply 2 g topically 4 (four) times daily as needed. 100 g 0     No current facility-administered medications for this visit.       Past Medical History:   Diagnosis Date    Aortic atherosclerosis     Diabetes mellitus     High cholesterol     Hypertension     Sarcoidosis      Past Surgical History:   Procedure Laterality Date    CHOLECYSTECTOMY      COLONOSCOPY  "N/A 9/13/2019    Procedure: COLONOSCOPY;  Surgeon: Miguelito Garcia MD;  Location: [facility] ([address] [place] [address]);  Service: Endoscopy;  Laterality: N/A;  Referral from SABRINA Gonzalez [facility]  Ht: 5'9"  Wt: 180 lbs BMI: 26.2     No family history on file.  Social History     Tobacco Use    Smoking status: Former Smoker     Types: Cigarettes    Smokeless tobacco: Never Used   Substance Use Topics    Alcohol use: No    Drug use: Never        Review of Systems:  Review of Systems   Constitutional: Negative.    HENT: Negative.    Eyes: Negative.    Respiratory: Negative.    Cardiovascular: Negative.    Gastrointestinal: Negative.    Endocrine: Negative.    Musculoskeletal: Negative.    Skin: Negative.    Allergic/Immunologic: Negative.    Neurological: Negative.    Hematological: Negative.    Psychiatric/Behavioral: Negative.    All other systems reviewed and are negative.      OBJECTIVE:     Vital Signs (Most Recent)  BP: (!) 168/73 (02/17/22 0909)  5' 9" (1.753 m)  86.5 kg (190 lb 11.2 oz)     Physical Exam:  Physical Exam  Vitals reviewed.   Constitutional:       Appearance: She is well-developed and well-nourished.   HENT:      Head: Normocephalic and atraumatic.      Right Ear: External ear normal.      Left Ear: External ear normal.      Nose: Nose normal.      Mouth/Throat:      Mouth: Oropharynx is clear and moist.   Eyes:      Extraocular Movements: EOM normal.      Conjunctiva/sclera: Conjunctivae normal.      Pupils: Pupils are equal, round, and reactive to light.   Cardiovascular:      Rate and Rhythm: Normal rate and regular rhythm.      Pulses: Intact distal pulses.      Heart sounds: Normal heart sounds.   Pulmonary:      Effort: Pulmonary effort is normal.      Breath sounds: Normal breath sounds.   Abdominal:      General: Bowel sounds are normal.      Palpations: Abdomen is soft.   Musculoskeletal:         General: Normal range of motion.        Arms:       Cervical back: Normal range of motion and neck supple. "   Skin:     General: Skin is warm and dry.   Neurological:      Mental Status: She is alert and oriented to person, place, and time.      Deep Tendon Reflexes: Reflexes are normal and symmetric.   Psychiatric:         Mood and Affect: Mood and affect normal.         Behavior: Behavior normal.         Thought Content: Thought content normal.         Laboratory  none    Diagnostic Results:  CT: Reviewed  mass of right arm    ASSESSMENT/PLAN:     Right upper extremity mass    PLAN:Plan     I discussed her status with the mass and I will take her to the OR for excision with the risks explained

## 2022-02-17 NOTE — PROGRESS NOTES
History & Physical    SUBJECTIVE:     History of Present Illness:  Patient is a 76 y.o. female presents with right upper extremity mass for year that has increased in size recently. It is more symptomatic    Chief Complaint   Patient presents with    Consult     Cyst on right upper arm       Review of patient's allergies indicates:   Allergen Reactions    Darvocet a500 [propoxyphene n-acetaminophen] Swelling    Penicillins Swelling    Percocet [oxycodone-acetaminophen]        Current Outpatient Medications   Medication Sig Dispense Refill    amLODIPine (NORVASC) 5 MG tablet Take 5 mg by mouth once daily.  0    aspirin (ECOTRIN) 81 MG EC tablet Take 1 tablet (81 mg total) by mouth once daily. 90 tablet 3    ergocalciferol (ERGOCALCIFEROL) 50,000 unit Cap Take 50,000 Units by mouth every 7 days.      glipiZIDE (GLUCOTROL) 10 MG TR24       hydroCHLOROthiazide (MICROZIDE) 12.5 mg capsule Take 12.5 mg by mouth once daily.      lisinopril (PRINIVIL,ZESTRIL) 40 MG tablet Take 40 mg by mouth once daily.      loratadine (CLARITIN) 10 mg tablet Take 10 mg by mouth once daily.      meloxicam (MOBIC) 7.5 MG tablet Take 7.5 mg by mouth once daily.      metformin (GLUCOPHAGE-XR) 500 MG 24 hr tablet       multivitamin capsule Take 1 capsule by mouth once daily.      mupirocin (BACTROBAN) 2 % ointment Use qd on wound 30 g 2    ondansetron (ZOFRAN) 4 MG tablet Take 1 tablet (4 mg total) by mouth every 8 (eight) hours as needed. 30 tablet 0    rosuvastatin (CRESTOR) 20 MG tablet Take 20 mg by mouth once daily.      diclofenac sodium (VOLTAREN) 1 % Gel Apply 2 g topically 4 (four) times daily as needed. 100 g 0     No current facility-administered medications for this visit.       Past Medical History:   Diagnosis Date    Aortic atherosclerosis     Diabetes mellitus     High cholesterol     Hypertension     Sarcoidosis      Past Surgical History:   Procedure Laterality Date    CHOLECYSTECTOMY      COLONOSCOPY  "N/A 9/13/2019    Procedure: COLONOSCOPY;  Surgeon: Miguelito Garcia MD;  Location: [facility] ([address] [place] [address]);  Service: Endoscopy;  Laterality: N/A;  Referral from SABRINA [facility]  Ht: 5'9"  Wt: 180 lbs BMI: 26.2     No family history on file.  Social History     Tobacco Use    Smoking status: Former Smoker     Types: Cigarettes    Smokeless tobacco: Never Used   Substance Use Topics    Alcohol use: No    Drug use: Never        Review of Systems:  Review of Systems   Constitutional: Negative.    HENT: Negative.    Eyes: Negative.    Respiratory: Negative.    Cardiovascular: Negative.    Gastrointestinal: Negative.    Endocrine: Negative.    Musculoskeletal: Negative.    Skin: Negative.    Allergic/Immunologic: Negative.    Neurological: Negative.    Hematological: Negative.    Psychiatric/Behavioral: Negative.    All other systems reviewed and are negative.      OBJECTIVE:     Vital Signs (Most Recent)  BP: (!) 168/73 (02/17/22 0909)  5' 9" (1.753 m)  86.5 kg (190 lb 11.2 oz)     Physical Exam:  Physical Exam  Vitals reviewed.   Constitutional:       Appearance: She is well-developed and well-nourished.   HENT:      Head: Normocephalic and atraumatic.      Right Ear: External ear normal.      Left Ear: External ear normal.      Nose: Nose normal.      Mouth/Throat:      Mouth: Oropharynx is clear and moist.   Eyes:      Extraocular Movements: EOM normal.      Conjunctiva/sclera: Conjunctivae normal.      Pupils: Pupils are equal, round, and reactive to light.   Cardiovascular:      Rate and Rhythm: Normal rate and regular rhythm.      Pulses: Intact distal pulses.      Heart sounds: Normal heart sounds.   Pulmonary:      Effort: Pulmonary effort is normal.      Breath sounds: Normal breath sounds.   Abdominal:      General: Bowel sounds are normal.      Palpations: Abdomen is soft.   Musculoskeletal:         General: Normal range of motion.        Arms:       Cervical back: Normal range of motion and neck supple. "   Skin:     General: Skin is warm and dry.   Neurological:      Mental Status: She is alert and oriented to person, place, and time.      Deep Tendon Reflexes: Reflexes are normal and symmetric.   Psychiatric:         Mood and Affect: Mood and affect normal.         Behavior: Behavior normal.         Thought Content: Thought content normal.         Laboratory  none    Diagnostic Results:  CT: Reviewed  mass of right arm    ASSESSMENT/PLAN:     Right upper extremity mass    PLAN:Plan     I discussed her status with the mass and I will take her to the OR for excision with the risks explained

## 2022-02-25 ENCOUNTER — ANESTHESIA EVENT (OUTPATIENT)
Dept: SURGERY | Facility: HOSPITAL | Age: 77
End: 2022-02-25
Payer: MEDICARE

## 2022-03-03 ENCOUNTER — HOSPITAL ENCOUNTER (OUTPATIENT)
Dept: PREADMISSION TESTING | Facility: HOSPITAL | Age: 77
Discharge: HOME | End: 2022-03-03
Attending: SURGERY
Payer: MEDICARE

## 2022-03-03 VITALS
BODY MASS INDEX: 28.05 KG/M2 | SYSTOLIC BLOOD PRESSURE: 141 MMHG | WEIGHT: 189.38 LBS | HEIGHT: 69 IN | RESPIRATION RATE: 18 BRPM | TEMPERATURE: 98 F | OXYGEN SATURATION: 100 % | DIASTOLIC BLOOD PRESSURE: 77 MMHG | HEART RATE: 92 BPM

## 2022-03-03 DIAGNOSIS — Z01.818 PREOPERATIVE TESTING: Primary | ICD-10-CM

## 2022-03-03 DIAGNOSIS — Z11.52 ENCOUNTER FOR PREOPERATIVE SCREENING LABORATORY TESTING FOR COVID-19 VIRUS: ICD-10-CM

## 2022-03-03 DIAGNOSIS — Z01.812 ENCOUNTER FOR PREOPERATIVE SCREENING LABORATORY TESTING FOR COVID-19 VIRUS: ICD-10-CM

## 2022-03-03 DIAGNOSIS — R22.31 ARM MASS, RIGHT: ICD-10-CM

## 2022-03-03 DIAGNOSIS — Z79.01 ANTICOAGULANT LONG-TERM USE: ICD-10-CM

## 2022-03-03 LAB
ALBUMIN SERPL BCP-MCNC: 3.2 G/DL (ref 3.5–5.2)
ALP SERPL-CCNC: 79 U/L (ref 55–135)
ALT SERPL W/O P-5'-P-CCNC: 12 U/L (ref 10–44)
ANION GAP SERPL CALC-SCNC: 8 MMOL/L (ref 8–16)
APTT BLDCRRT: 28.9 SEC (ref 21–32)
AST SERPL-CCNC: 7 U/L (ref 10–40)
BASOPHILS # BLD AUTO: 0.04 K/UL (ref 0–0.2)
BASOPHILS NFR BLD: 0.5 % (ref 0–1.9)
BILIRUB SERPL-MCNC: 0.2 MG/DL (ref 0.1–1)
BUN SERPL-MCNC: 14 MG/DL (ref 8–23)
CALCIUM SERPL-MCNC: 9.2 MG/DL (ref 8.7–10.5)
CHLORIDE SERPL-SCNC: 98 MMOL/L (ref 95–110)
CO2 SERPL-SCNC: 29 MMOL/L (ref 23–29)
CREAT SERPL-MCNC: 0.9 MG/DL (ref 0.5–1.4)
DIFFERENTIAL METHOD: ABNORMAL
EOSINOPHIL # BLD AUTO: 0 K/UL (ref 0–0.5)
EOSINOPHIL NFR BLD: 0.3 % (ref 0–8)
ERYTHROCYTE [DISTWIDTH] IN BLOOD BY AUTOMATED COUNT: 17 % (ref 11.5–14.5)
EST. GFR  (AFRICAN AMERICAN): >60 ML/MIN/1.73 M^2
EST. GFR  (NON AFRICAN AMERICAN): >60 ML/MIN/1.73 M^2
GLUCOSE SERPL-MCNC: 297 MG/DL (ref 70–110)
HCT VFR BLD AUTO: 35.3 % (ref 37–48.5)
HGB BLD-MCNC: 10.6 G/DL (ref 12–16)
IMM GRANULOCYTES # BLD AUTO: 0.03 K/UL (ref 0–0.04)
IMM GRANULOCYTES NFR BLD AUTO: 0.3 % (ref 0–0.5)
INR PPP: 1 (ref 0.8–1.2)
LYMPHOCYTES # BLD AUTO: 1.9 K/UL (ref 1–4.8)
LYMPHOCYTES NFR BLD: 21.9 % (ref 18–48)
MCH RBC QN AUTO: 22.8 PG (ref 27–31)
MCHC RBC AUTO-ENTMCNC: 30 G/DL (ref 32–36)
MCV RBC AUTO: 76 FL (ref 82–98)
MONOCYTES # BLD AUTO: 0.5 K/UL (ref 0.3–1)
MONOCYTES NFR BLD: 5.2 % (ref 4–15)
NEUTROPHILS # BLD AUTO: 6.2 K/UL (ref 1.8–7.7)
NEUTROPHILS NFR BLD: 71.8 % (ref 38–73)
NRBC BLD-RTO: 0 /100 WBC
PLATELET # BLD AUTO: 225 K/UL (ref 150–450)
PMV BLD AUTO: 9.7 FL (ref 9.2–12.9)
POTASSIUM SERPL-SCNC: 4.3 MMOL/L (ref 3.5–5.1)
PROT SERPL-MCNC: 7.3 G/DL (ref 6–8.4)
PROTHROMBIN TIME: 10.3 SEC (ref 9–12.5)
RBC # BLD AUTO: 4.64 M/UL (ref 4–5.4)
SARS-COV-2 RDRP RESP QL NAA+PROBE: NEGATIVE
SODIUM SERPL-SCNC: 135 MMOL/L (ref 136–145)
WBC # BLD AUTO: 8.58 K/UL (ref 3.9–12.7)

## 2022-03-03 PROCEDURE — 85025 COMPLETE CBC W/AUTO DIFF WBC: CPT | Performed by: SURGERY

## 2022-03-03 PROCEDURE — 93010 EKG 12-LEAD: ICD-10-PCS | Mod: ,,, | Performed by: INTERNAL MEDICINE

## 2022-03-03 PROCEDURE — 93005 ELECTROCARDIOGRAM TRACING: CPT

## 2022-03-03 PROCEDURE — 80053 COMPREHEN METABOLIC PANEL: CPT | Performed by: SURGERY

## 2022-03-03 PROCEDURE — 93010 ELECTROCARDIOGRAM REPORT: CPT | Mod: ,,, | Performed by: INTERNAL MEDICINE

## 2022-03-03 PROCEDURE — U0002 COVID-19 LAB TEST NON-CDC: HCPCS | Performed by: SURGERY

## 2022-03-03 PROCEDURE — 85730 THROMBOPLASTIN TIME PARTIAL: CPT | Performed by: SURGERY

## 2022-03-03 PROCEDURE — 85610 PROTHROMBIN TIME: CPT | Performed by: SURGERY

## 2022-03-03 RX ORDER — FLUCONAZOLE 150 MG/1
150 TABLET ORAL ONCE AS NEEDED
COMMUNITY
Start: 2021-12-01

## 2022-03-03 NOTE — PLAN OF CARE
"Pre-operative instructions, medication directives and pain scales reviewed with patient. All questions the patient had were answered. Re-assurance about surgical procedure and day of surgery routine given as needed, patient verbalized understanding of the pre-op instructions.    Patient reports that she "took herself off" off of her amlodipine, Crestor, and HCTZ after talking with her three daughters who are nurses. She states that her daughters told her that she didn't need to take these medications, thus she stopped taking them and admits that she has been "noncompliant" with them for "a while." She reports that she is compliant with her lisinopril, metformin, glipizide, and aspirin. She reports that she takes Mobic as needed for pain. Leslie Mensah, NP w/ Anesthesia was notified.     Pt instructed to maintain NPO status starting at midnight tonight (with the exception of a sip of water with AM meds) and to avoid taking antidiabetics on the morning of surgery. She verbalized understanding. She was also instructed on the use of Hibiclens to which she also verbalized understanding. Pre-op Center contact info was provided for any additional questions.    "

## 2022-03-03 NOTE — ANESTHESIA PREPROCEDURE EVALUATION
"                                                                                                             03/03/2022  Mariangel Moreau is a 76 y.o., female scheduled for EXCISION, MASS, UPPER EXTREMITY (Right ) on 3/4/2022.    Past Medical History:   Diagnosis Date    Aortic atherosclerosis     Diabetes mellitus     High cholesterol     Hypertension     Sarcoidosis        Past Surgical History:   Procedure Laterality Date    CHOLECYSTECTOMY      COLONOSCOPY N/A 9/13/2019    Procedure: COLONOSCOPY;  Surgeon: Miguelito Garcia MD;  Location: [address]);  Service: Endoscopy;  Laterality: N/A;  [facility]  Ht: 5'9"  Wt: 180 lbs BMI: 26.2       Lab Results   Component Value Date    WBC 8.58 03/03/2022    HGB 10.6 (L) 03/03/2022    HCT 35.3 (L) 03/03/2022    MCV 76 (L) 03/03/2022     03/03/2022       CMP  Sodium   Date Value Ref Range Status   03/03/2022 135 (L) 136 - 145 mmol/L Final     Potassium   Date Value Ref Range Status   03/03/2022 4.3 3.5 - 5.1 mmol/L Final     Chloride   Date Value Ref Range Status   03/03/2022 98 95 - 110 mmol/L Final     CO2   Date Value Ref Range Status   03/03/2022 29 23 - 29 mmol/L Final     Glucose   Date Value Ref Range Status   03/03/2022 297 (H) 70 - 110 mg/dL Final     BUN   Date Value Ref Range Status   03/03/2022 14 8 - 23 mg/dL Final     Creatinine   Date Value Ref Range Status   03/03/2022 0.9 0.5 - 1.4 mg/dL Final     Calcium   Date Value Ref Range Status   03/03/2022 9.2 8.7 - 10.5 mg/dL Final     Total Protein   Date Value Ref Range Status   03/03/2022 7.3 6.0 - 8.4 g/dL Final     Albumin   Date Value Ref Range Status   03/03/2022 3.2 (L) 3.5 - 5.2 g/dL Final     Total Bilirubin   Date Value Ref Range Status   03/03/2022 0.2 0.1 - 1.0 mg/dL Final     Comment:     For infants and newborns, interpretation of results should be based  on gestational age, weight and in agreement with clinical  observations.    Premature Infant " recommended reference ranges:  Up to 24 hours.............<8.0 mg/dL  Up to 48 hours............<12.0 mg/dL  3-5 days..................<15.0 mg/dL  6-29 days.................<15.0 mg/dL       Alkaline Phosphatase   Date Value Ref Range Status   03/03/2022 79 55 - 135 U/L Final     AST   Date Value Ref Range Status   03/03/2022 7 (L) 10 - 40 U/L Final     ALT   Date Value Ref Range Status   03/03/2022 12 10 - 44 U/L Final     Anion Gap   Date Value Ref Range Status   03/03/2022 8 8 - 16 mmol/L Final     eGFR if    Date Value Ref Range Status   03/03/2022 >60 >60 mL/min/1.73 m^2 Final     eGFR if non    Date Value Ref Range Status   03/03/2022 >60 >60 mL/min/1.73 m^2 Final     Comment:     Calculation used to obtain the estimated glomerular filtration  rate (eGFR) is the CKD-EPI equation.            Pre-op Assessment    I have reviewed the Patient Summary Reports.     I have reviewed the Nursing Notes. I have reviewed the NPO Status.   I have reviewed the Medications.     Review of Systems  Anesthesia Hx:  No problems with previous Anesthesia  History of prior surgery of interest to airway management or planning: Denies Family Hx of Anesthesia complications.   Denies Personal Hx of Anesthesia complications.   Social:  No Alcohol Use, Former Smoker    Hematology/Oncology:  Hematology Normal   Oncology Normal     EENT/Dental:EENT/Dental Normal   Cardiovascular:   Hypertension  Denies Angina. hyperlipidemia     ECG has been reviewed.  Functional Capacity good / => 4 METS    Pulmonary:  Pulmonary Normal    Renal/:  Renal/ Normal     Hepatic/GI:  Hepatic/GI Normal    Musculoskeletal:   Right arm mass   Neurological:  Neurology Normal    Endocrine:   Diabetes, type 2    Dermatological:  Skin Normal    Psych:  Psychiatric Normal           Physical Exam  General: Well nourished    Airway:  Mallampati: II   TM Distance: 4 - 6 cm          Anesthesia Plan  Type of Anesthesia, risks & benefits  discussed:    Anesthesia Type: MAC, Gen Natural Airway  Intra-op Monitoring Plan: Standard ASA Monitors  Post Op Pain Control Plan: multimodal analgesia  Induction:  IV  Informed Consent: Informed consent signed with the Patient and all parties understand the risks and agree with anesthesia plan.  All questions answered.   ASA Score: 3  Anesthesia Plan Notes: npo    Ready For Surgery From Anesthesia Perspective.     .

## 2022-03-03 NOTE — DISCHARGE INSTRUCTIONS
Before 7 AM, enter through the Emergency Entrance..   After 7 AM enter through the Main Entrance.      Your procedure  is scheduled for tomorrow, March 4, 2022.    Call [phone] between 2pm and 5pm on TODAY_to find out your arrival time for the day of surgery.    You may use the main entrance to the hospital on the [facility] side, or the entrance that is next to the [facility].    You may have one visitor.  Visiting hours for non-COVID-19 patients expanded to 24/7 (still restricted to one visitor)  Youth visitation changed from age 18 to age 12.      You will be going to the Same Day Surgery Unit on the 2nd floor of the hospital.    Important instructions:  Do not eat or drink after 12 midnight, including water.  It is okay to brush your teeth.  Do not have gum, candy or mints.    SEE MEDICATION SHEET.   TAKE MEDICATIONS AS DIRECTED WITH SIPS OF WATER.      Do not take any diabetic medication on the morning of surgery unless instructed to do so by your doctor or pre op nurse.    STOP taking Aspirin, Ibuprofen,  Advil, Motrin, Mobic(meloxicam), Aleve (naproxen), Fish oil, and Vitamin E for at least 7 days before your surgery.     You may take Tylenol if needed which is not a blood thinner.    Please shower the night before and the morning of your surgery.      Use Hibiclens soap as instructed by your pre op nurse.   Please place clean linens on your bed the night before surgery. Please wear fresh clean clothing after each shower.    No shaving of procedural area at least 4-5 days before surgery due to increased risk of skin irritation and/or possible infection.    Contact lenses and removable denture work may not be worn during your procedure.    You may wear deodorant only. If you are having breast surgery, do not wear deodorant on the operative side.    Do not wear powder, body lotion, perfume/cologne or make-up.    Do not wear any jewelry or have any metal on your body.    You will be asked to remove  any dentures or partials for the procedure.    If you are going home on the same day of surgery, you must arrange for a family member or a friend to drive you home.  Public transportation is prohibited.  You will not be able to drive home if you were given anesthesia or sedation.    Patients who want to have their Post-op prescriptions filled from our in-house [facility], bring a Credit/Debit Card  or cash with you. A co-pay may be required.  The pharmacy closes at 5:30 pm.    Children under 18 years of age require a parent/guardian present the entire time that they are here.    Wear loose fitting clothes allowing for bandages.    Please leave money and valuables home.      You may bring your cell phone.    Call the doctor if fever or illness should occur before your surgery.    Call [phone] to contact us here if needed.

## 2022-03-04 ENCOUNTER — HOSPITAL ENCOUNTER (OUTPATIENT)
Facility: HOSPITAL | Age: 77
Discharge: HOME | End: 2022-03-04
Attending: SURGERY | Admitting: SURGERY
Payer: MEDICARE

## 2022-03-04 ENCOUNTER — ANESTHESIA (OUTPATIENT)
Dept: SURGERY | Facility: HOSPITAL | Age: 77
End: 2022-03-04
Payer: MEDICARE

## 2022-03-04 VITALS
WEIGHT: 189.38 LBS | DIASTOLIC BLOOD PRESSURE: 69 MMHG | TEMPERATURE: 98 F | RESPIRATION RATE: 16 BRPM | SYSTOLIC BLOOD PRESSURE: 155 MMHG | BODY MASS INDEX: 27.97 KG/M2 | HEART RATE: 77 BPM | OXYGEN SATURATION: 97 %

## 2022-03-04 DIAGNOSIS — R22.31 ARM MASS, RIGHT: Primary | ICD-10-CM

## 2022-03-04 DIAGNOSIS — Z01.812 ENCOUNTER FOR PREOPERATIVE SCREENING LABORATORY TESTING FOR COVID-19 VIRUS: ICD-10-CM

## 2022-03-04 DIAGNOSIS — Z11.52 ENCOUNTER FOR PREOPERATIVE SCREENING LABORATORY TESTING FOR COVID-19 VIRUS: ICD-10-CM

## 2022-03-04 LAB — POCT GLUCOSE: 234 MG/DL (ref 70–110)

## 2022-03-04 PROCEDURE — 24071 EXC ARM/ELBOW LES SC 3 CM/>: CPT | Mod: RT,,, | Performed by: SURGERY

## 2022-03-04 PROCEDURE — 71000016 HC POSTOP RECOV ADDL HR: Performed by: SURGERY

## 2022-03-04 PROCEDURE — 88304 PR  SURG PATH,LEVEL III: ICD-10-PCS | Mod: 26,,, | Performed by: PATHOLOGY

## 2022-03-04 PROCEDURE — D9220A PRA ANESTHESIA: ICD-10-PCS | Mod: ANES,,, | Performed by: ANESTHESIOLOGY

## 2022-03-04 PROCEDURE — 71000015 HC POSTOP RECOV 1ST HR: Performed by: SURGERY

## 2022-03-04 PROCEDURE — 63600175 PHARM REV CODE 636 W HCPCS: Performed by: ANESTHESIOLOGY

## 2022-03-04 PROCEDURE — 71000033 HC RECOVERY, INTIAL HOUR: Performed by: SURGERY

## 2022-03-04 PROCEDURE — 88304 TISSUE EXAM BY PATHOLOGIST: CPT | Mod: 26,,, | Performed by: PATHOLOGY

## 2022-03-04 PROCEDURE — 63600175 PHARM REV CODE 636 W HCPCS: Performed by: NURSE ANESTHETIST, CERTIFIED REGISTERED

## 2022-03-04 PROCEDURE — 71000039 HC RECOVERY, EACH ADD'L HOUR: Performed by: SURGERY

## 2022-03-04 PROCEDURE — D9220A PRA ANESTHESIA: Mod: CRNA,,, | Performed by: NURSE ANESTHETIST, CERTIFIED REGISTERED

## 2022-03-04 PROCEDURE — 37000009 HC ANESTHESIA EA ADD 15 MINS: Performed by: SURGERY

## 2022-03-04 PROCEDURE — D9220A PRA ANESTHESIA: Mod: ANES,,, | Performed by: ANESTHESIOLOGY

## 2022-03-04 PROCEDURE — 25000003 PHARM REV CODE 250: Performed by: NURSE ANESTHETIST, CERTIFIED REGISTERED

## 2022-03-04 PROCEDURE — 24071 PR EXC TUMOR SOFT TISSUE UPPER ARM/ELBOW SUBQ 3+CM: ICD-10-PCS | Mod: RT,,, | Performed by: SURGERY

## 2022-03-04 PROCEDURE — 36000707: Performed by: SURGERY

## 2022-03-04 PROCEDURE — 36000706: Performed by: SURGERY

## 2022-03-04 PROCEDURE — 25000003 PHARM REV CODE 250: Performed by: SURGERY

## 2022-03-04 PROCEDURE — 82962 GLUCOSE BLOOD TEST: CPT | Performed by: SURGERY

## 2022-03-04 PROCEDURE — 63600175 PHARM REV CODE 636 W HCPCS: Performed by: SURGERY

## 2022-03-04 PROCEDURE — 88304 TISSUE EXAM BY PATHOLOGIST: CPT | Performed by: PATHOLOGY

## 2022-03-04 PROCEDURE — 37000008 HC ANESTHESIA 1ST 15 MINUTES: Performed by: SURGERY

## 2022-03-04 PROCEDURE — D9220A PRA ANESTHESIA: ICD-10-PCS | Mod: CRNA,,, | Performed by: NURSE ANESTHETIST, CERTIFIED REGISTERED

## 2022-03-04 RX ORDER — CEFAZOLIN SODIUM 1 G/50ML
2 SOLUTION INTRAVENOUS
Status: COMPLETED | OUTPATIENT
Start: 2022-03-04 | End: 2022-03-04

## 2022-03-04 RX ORDER — SODIUM CHLORIDE 9 MG/ML
INJECTION, SOLUTION INTRAVENOUS CONTINUOUS
Status: DISCONTINUED | OUTPATIENT
Start: 2022-03-04 | End: 2022-03-04 | Stop reason: HOSPADM

## 2022-03-04 RX ORDER — SODIUM CHLORIDE 0.9 % (FLUSH) 0.9 %
10 SYRINGE (ML) INJECTION
Status: CANCELLED | OUTPATIENT
Start: 2022-03-04

## 2022-03-04 RX ORDER — FENTANYL CITRATE 50 UG/ML
25 INJECTION, SOLUTION INTRAMUSCULAR; INTRAVENOUS EVERY 5 MIN PRN
Status: DISCONTINUED | OUTPATIENT
Start: 2022-03-04 | End: 2022-03-04 | Stop reason: HOSPADM

## 2022-03-04 RX ORDER — IBUPROFEN 600 MG/1
600 TABLET ORAL 3 TIMES DAILY
Qty: 30 TABLET | Refills: 1 | Status: SHIPPED | OUTPATIENT
Start: 2022-03-04

## 2022-03-04 RX ORDER — ACETAMINOPHEN 10 MG/ML
1000 INJECTION, SOLUTION INTRAVENOUS ONCE
Status: DISCONTINUED | OUTPATIENT
Start: 2022-03-04 | End: 2022-03-04

## 2022-03-04 RX ORDER — LIDOCAINE HCL/PF 100 MG/5ML
SYRINGE (ML) INTRAVENOUS
Status: DISCONTINUED | OUTPATIENT
Start: 2022-03-04 | End: 2022-03-04

## 2022-03-04 RX ORDER — LIDOCAINE HYDROCHLORIDE 10 MG/ML
1 INJECTION, SOLUTION EPIDURAL; INFILTRATION; INTRACAUDAL; PERINEURAL ONCE
Status: DISCONTINUED | OUTPATIENT
Start: 2022-03-04 | End: 2022-03-04 | Stop reason: HOSPADM

## 2022-03-04 RX ORDER — KETOROLAC TROMETHAMINE 30 MG/ML
15 INJECTION, SOLUTION INTRAMUSCULAR; INTRAVENOUS EVERY 8 HOURS PRN
Status: DISCONTINUED | OUTPATIENT
Start: 2022-03-04 | End: 2022-03-04 | Stop reason: HOSPADM

## 2022-03-04 RX ORDER — SODIUM CHLORIDE 0.9 G/100ML
IRRIGANT IRRIGATION
Status: DISCONTINUED | OUTPATIENT
Start: 2022-03-04 | End: 2022-03-04 | Stop reason: HOSPADM

## 2022-03-04 RX ORDER — BUPIVACAINE HYDROCHLORIDE 2.5 MG/ML
INJECTION, SOLUTION INFILTRATION; PERINEURAL
Status: DISCONTINUED | OUTPATIENT
Start: 2022-03-04 | End: 2022-03-04 | Stop reason: HOSPADM

## 2022-03-04 RX ORDER — PROPOFOL 10 MG/ML
INJECTION, EMULSION INTRAVENOUS CONTINUOUS PRN
Status: DISCONTINUED | OUTPATIENT
Start: 2022-03-04 | End: 2022-03-04

## 2022-03-04 RX ORDER — SODIUM CHLORIDE, SODIUM LACTATE, POTASSIUM CHLORIDE, CALCIUM CHLORIDE 600; 310; 30; 20 MG/100ML; MG/100ML; MG/100ML; MG/100ML
INJECTION, SOLUTION INTRAVENOUS CONTINUOUS
Status: DISCONTINUED | OUTPATIENT
Start: 2022-03-04 | End: 2022-03-04 | Stop reason: HOSPADM

## 2022-03-04 RX ORDER — FENTANYL CITRATE 50 UG/ML
INJECTION, SOLUTION INTRAMUSCULAR; INTRAVENOUS
Status: DISCONTINUED | OUTPATIENT
Start: 2022-03-04 | End: 2022-03-04

## 2022-03-04 RX ORDER — SODIUM CHLORIDE 0.9 % (FLUSH) 0.9 %
10 SYRINGE (ML) INJECTION
Status: DISCONTINUED | OUTPATIENT
Start: 2022-03-04 | End: 2022-03-04 | Stop reason: HOSPADM

## 2022-03-04 RX ORDER — LIDOCAINE HYDROCHLORIDE 10 MG/ML
INJECTION, SOLUTION EPIDURAL; INFILTRATION; INTRACAUDAL; PERINEURAL
Status: DISCONTINUED | OUTPATIENT
Start: 2022-03-04 | End: 2022-03-04 | Stop reason: HOSPADM

## 2022-03-04 RX ORDER — PROPOFOL 10 MG/ML
VIAL (ML) INTRAVENOUS CONTINUOUS PRN
Status: DISCONTINUED | OUTPATIENT
Start: 2022-03-04 | End: 2022-03-04

## 2022-03-04 RX ORDER — FENTANYL CITRATE 50 UG/ML
25 INJECTION, SOLUTION INTRAMUSCULAR; INTRAVENOUS EVERY 5 MIN PRN
Status: CANCELLED | OUTPATIENT
Start: 2022-03-04

## 2022-03-04 RX ORDER — ONDANSETRON 2 MG/ML
INJECTION INTRAMUSCULAR; INTRAVENOUS
Status: DISCONTINUED | OUTPATIENT
Start: 2022-03-04 | End: 2022-03-04

## 2022-03-04 RX ORDER — PROPOFOL 10 MG/ML
INJECTION, EMULSION INTRAVENOUS
Status: DISCONTINUED | OUTPATIENT
Start: 2022-03-04 | End: 2022-03-04

## 2022-03-04 RX ORDER — ACETAMINOPHEN 10 MG/ML
1000 INJECTION, SOLUTION INTRAVENOUS ONCE
Status: COMPLETED | OUTPATIENT
Start: 2022-03-04 | End: 2022-03-04

## 2022-03-04 RX ORDER — MORPHINE SULFATE 4 MG/ML
3 INJECTION, SOLUTION INTRAMUSCULAR; INTRAVENOUS
Status: DISCONTINUED | OUTPATIENT
Start: 2022-03-04 | End: 2022-03-04 | Stop reason: HOSPADM

## 2022-03-04 RX ORDER — MUPIROCIN 20 MG/G
OINTMENT TOPICAL
Status: DISCONTINUED | OUTPATIENT
Start: 2022-03-04 | End: 2022-03-04 | Stop reason: HOSPADM

## 2022-03-04 RX ORDER — MIDAZOLAM HYDROCHLORIDE 1 MG/ML
INJECTION, SOLUTION INTRAMUSCULAR; INTRAVENOUS
Status: DISCONTINUED | OUTPATIENT
Start: 2022-03-04 | End: 2022-03-04

## 2022-03-04 RX ADMIN — FENTANYL CITRATE 25 MCG: 50 INJECTION, SOLUTION INTRAMUSCULAR; INTRAVENOUS at 03:03

## 2022-03-04 RX ADMIN — PROPOFOL 50 MCG/KG/MIN: 10 INJECTION, EMULSION INTRAVENOUS at 03:03

## 2022-03-04 RX ADMIN — PROPOFOL 20 MG: 10 INJECTION, EMULSION INTRAVENOUS at 03:03

## 2022-03-04 RX ADMIN — CEFAZOLIN SODIUM 2 G: 1 SOLUTION INTRAVENOUS at 03:03

## 2022-03-04 RX ADMIN — SODIUM CHLORIDE, SODIUM LACTATE, POTASSIUM CHLORIDE, AND CALCIUM CHLORIDE: .6; .31; .03; .02 INJECTION, SOLUTION INTRAVENOUS at 10:03

## 2022-03-04 RX ADMIN — LIDOCAINE HYDROCHLORIDE 60 MG: 20 INJECTION, SOLUTION INTRAVENOUS at 03:03

## 2022-03-04 RX ADMIN — ONDANSETRON 4 MG: 2 INJECTION, SOLUTION INTRAMUSCULAR; INTRAVENOUS at 03:03

## 2022-03-04 RX ADMIN — ACETAMINOPHEN 1000 MG: 10 INJECTION INTRAVENOUS at 04:03

## 2022-03-04 RX ADMIN — FENTANYL CITRATE 25 MCG: 50 INJECTION INTRAMUSCULAR; INTRAVENOUS at 05:03

## 2022-03-04 RX ADMIN — MIDAZOLAM HYDROCHLORIDE 1 MG: 1 INJECTION, SOLUTION INTRAMUSCULAR; INTRAVENOUS at 03:03

## 2022-03-04 RX ADMIN — KETOROLAC TROMETHAMINE 15 MG: 30 INJECTION, SOLUTION INTRAMUSCULAR; INTRAVENOUS at 05:03

## 2022-03-04 RX ADMIN — FENTANYL CITRATE 50 MCG: 50 INJECTION, SOLUTION INTRAMUSCULAR; INTRAVENOUS at 03:03

## 2022-03-04 RX ADMIN — MUPIROCIN: 20 OINTMENT TOPICAL at 10:03

## 2022-03-04 NOTE — OP NOTE
[facility] - Surgery  General Surgery  Operative Note    SUMMARY     Date of Procedure: 3/4/2022     Procedure: Procedure(s) (LRB):  EXCISION, MASS, UPPER EXTREMITY (Right)       Surgeon(s) and Role:     * Miller Saravia MD - Primary    Assisting Surgeon: None    Pre-Operative Diagnosis: Arm mass, right [R22.31]    Post-Operative Diagnosis: Post-Op Diagnosis Codes:     * Arm mass, right [R22.31]    Anesthesia: Local MAC    Operative Findings (including complications, if any):  10 cm mass was 6 cm incision    Description of Technical Procedures:  Patient was taken to the operating room postoperative table in left lateral decubitus position prepped and draped around the right upper on a usual sterile fashion.  A 6 cm incision was used over lying this mass off the scope subcutaneous his was upgraded with 1% lidocaine 0.25% Marcaine 50 50 solution.  This was done so after a time-out was taken a surgical tech this was discussed.  The mass was dissected free from surrounding tissue and felt to be a lobulated lipoma.  Once it was removed hemostasis was obtained electrocautery.  Was injected with local anesthetic again within them the incision was closed in layers with absorbable suture.  Dermabond tape was used she was awakened and transported to the room is satisfactory condition.    Significant Surgical Tasks Conducted by the Assistant(s), if Applicable:  None    Estimated Blood Loss (EBL): * No values recorded between 3/4/2022  3:41 PM and 3/4/2022  4:09 PM *           Implants: * No implants in log *    Specimens:   Specimen (24h ago, onward)             Start     Ordered    03/04/22 8296  Specimen to Pathology, Surgery General Surgery  Once        Comments: Pre-op Diagnosis: Arm mass, right [R22.31]    Procedure(s):  EXCISION, MASS, UPPER EXTREMITY     Number of specimens: 1    Name of specimens: right upper arm mass   References:    Click here for ordering Quick Tip   Question Answer Comment   Procedure Type:  General Surgery    Specimen Class: Routine/Screening    Release to patient Immediate        03/04/22 1556                        Condition: Good    Disposition: PACU - hemodynamically stable.    Attestation: I performed the procedure.

## 2022-03-04 NOTE — TRANSFER OF CARE
Anesthesia Transfer of Care Note    Patient: Mariangel Moreau    Procedure(s) Performed: Procedure(s) (LRB):  EXCISION, MASS, UPPER EXTREMITY (Right)    Patient location: PACU    Anesthesia Type: MAC    Transport from OR: Transported from OR on room air with adequate spontaneous ventilation    Post pain: adequate analgesia    Post assessment: tolerated procedure well and no apparent anesthetic complications    Post vital signs: stable    Level of consciousness: sedated and responds to stimulation    Nausea/Vomiting: no nausea/vomiting    Complications: none    Transfer of care protocol was followed      Last vitals:   Visit Vitals  BP (!) 160/72 (BP Location: Left arm)   Pulse 90   Temp 36.7 °C (98.1 °F) (Oral)   Resp 17   Wt 85.9 kg (189 lb 6 oz)   SpO2 100%   Breastfeeding No   BMI 27.97 kg/m²

## 2022-03-04 NOTE — BRIEF OP NOTE
[facility] - Surgery  Brief Operative Note    Surgery Date: 3/4/2022     Surgeon(s) and Role:     * Miller Saravia MD - Primary    Assisting Surgeon: None    Pre-op Diagnosis:  Arm mass, right [R22.31]    Post-op Diagnosis:  Post-Op Diagnosis Codes:     * Arm mass, right [R22.31]    Procedure(s) (LRB):  EXCISION, MASS, UPPER EXTREMITY (Right)    Anesthesia: Local MAC    Operative Findings: 10 cm mass through a 6 cm incision    Estimated Blood Loss: * No values recorded between 3/4/2022  3:41 PM and 3/4/2022  4:09 PM *         Specimens:   Specimen (24h ago, onward)             Start     Ordered    03/04/22 1546  Specimen to Pathology, Surgery General Surgery  Once        Comments: Pre-op Diagnosis: Arm mass, right [R22.31]    Procedure(s):  EXCISION, MASS, UPPER EXTREMITY     Number of specimens: 1    Name of specimens: right upper arm mass   References:    Click here for ordering Quick Tip   Question Answer Comment   Procedure Type: General Surgery    Specimen Class: Routine/Screening    Release to patient Immediate        03/04/22 1556                  Discharge Note    OUTCOME: Patient tolerated treatment/procedure well without complication and is now ready for discharge.    DISPOSITION: Home or Self Care    FINAL DIAGNOSIS:  Arm mass, right    FOLLOWUP: In clinic    DISCHARGE INSTRUCTIONS:    Discharge Procedure Orders   Diet general     Remove dressing in 24 hours     Call MD for:  temperature >100.4     Call MD for:  persistent nausea and vomiting     Call MD for:  severe uncontrolled pain     Call MD for:  difficulty breathing, headache or visual disturbances     Call MD for:  redness, tenderness, or signs of infection (pain, swelling, redness, odor or green/yellow discharge around incision site)     Call MD for:  hives     Shower on day dressing removed (No bath)

## 2022-03-05 NOTE — DISCHARGE INSTRUCTIONS
[facility]  Office # [phone]    Discharge Instructions for Same Day Surgery     Call the office for and appointment if one has not already been made.     Diet: Drink plenty of fluids the first 48 hours and you may resume your   usual diet.     Activity: No heavy lifting (over 10 pounds), pushing or pulling until your   post op visit. Your doctor's office may have told you to limit your lifting to less weight, or even no weight.  Be sure to follow those instructions.    Note: You may ride in a car and you may drive when comfortable.     Do not drive, drink alcohol, or sign legal documents for 24 hours, or if taking narcotic pain medication.    Dressings: You may shower  24 hours after surgery and you may wash your hair.     Dermabond / Prineotape    or a material like it was used on your incision.   It is like a liquid glue.   Do not peel or try to remove it it will start to fall off in 7-10 days on its' own.   It is OK to shower, pat dry, do not apply any creams or lotions.    No tub baths, swimming pools, hot tubs or submersion of the incision until your surgeon says it's ok.      Drains: If you have a drain, measure and record the drainage. Bring this information with you on your office visit.     Medical: Call the doctor for any of the following problems: fever above 101,   severe pain, bleeding, or abdominal distention (swelling).   If constipated you may take any stool softener you choose.     Occasionally small areas of skin numbness or an unpleasant skin sensation can result. Also, you may find that your incision is swollen and tender for a few days.  Some redness around sutures and staples is a normal reaction, but if the discomfort persists or worsens, call you doctor.             Fall Prevention  Millions of people fall every year and injure themselves. You may have had anesthesia or sedation which may increase your risk of falling. You may have health issues that put you at an  increased risk of falling.     Here are ways to reduce your risk of falling.    Make your home safe by keeping walkways clear of objects you may trip over.  Use non-slip pads under rugs. Do not use area rugs or small throw rugs.  Use non-slip mats in bathtubs and showers.  Install handrails and lights on staircases.  Do not walk in poorly lit areas.  Do not stand on chairs or wobbly ladders.  Use caution when reaching overhead or looking upward. This position can cause a loss of balance.  Be sure your shoes fit properly, have non-slip bottoms and are in good condition.   Wear shoes both inside and out. Avoid going barefoot or wearing slippers.  Be cautious when going up and down stairs, curbs, and when walking on uneven sidewalks.  If your balance is poor, consider using a cane or walker.  If your fall was related to alcohol use, stop or limit alcohol intake.   If your fall was related to use of sleeping medicines, talk to your doctor about this. You may need to reduce your dosage at bedtime if you awaken during the night to go to the bathroom.    To reduce the need for nighttime bathroom trips:  Avoid drinking fluids for several hours before going to bed  Empty your bladder before going to bed  Men can keep a urinal at the bedside  Stay as active as you can. Balance, flexibility, strength, and endurance all come from exercise. They all play a role in preventing falls. Ask your healthcare provider which types of activity are right for you.  Get your vision checked on a regular basis.  If you have pets, know where they are before you stand up or walk so you don't trip over them.  Use night lights.

## 2022-03-05 NOTE — ANESTHESIA POSTPROCEDURE EVALUATION
Anesthesia Post Evaluation    Patient: Mariangel Moreau    Procedure(s) Performed: Procedure(s) (LRB):  EXCISION, MASS, UPPER EXTREMITY (Right)    Final Anesthesia Type: MAC      Patient location during evaluation: PACU  Patient participation: Yes- Able to Participate  Level of consciousness: awake and alert  Post-procedure vital signs: reviewed and stable  Pain management: adequate  Airway patency: patent  RAHEEL mitigation strategies: Multimodal analgesia  PONV status at discharge: No PONV  Anesthetic complications: no      Cardiovascular status: blood pressure returned to baseline  Respiratory status: unassisted and spontaneous ventilation  Hydration status: euvolemic  Follow-up not needed.          Vitals Value Taken Time   /69 03/04/22 1910   Temp 36.9 °C (98.4 °F) 03/04/22 1910   Pulse 77 03/04/22 1910   Resp 16 03/04/22 1910   SpO2 97 % 03/04/22 1910         Event Time   Out of Recovery 17:55:00         Pain/Rachana Score: Pain Rating Prior to Med Admin: 5 (3/4/2022  5:10 PM)  Pain Rating Post Med Admin: 3 (3/4/2022  5:55 PM)  Rachana Score: 10 (3/4/2022  7:10 PM)  Modified Rachana Score: 20 (3/4/2022  7:10 PM)

## 2022-03-09 LAB
FINAL PATHOLOGIC DIAGNOSIS: NORMAL
GROSS: NORMAL
Lab: NORMAL

## 2022-03-11 ENCOUNTER — OFFICE VISIT (OUTPATIENT)
Dept: SURGERY | Facility: CLINIC | Age: 77
End: 2022-03-11
Payer: MEDICARE

## 2022-03-11 VITALS
BODY MASS INDEX: 28.05 KG/M2 | WEIGHT: 189.38 LBS | DIASTOLIC BLOOD PRESSURE: 77 MMHG | HEIGHT: 69 IN | HEART RATE: 79 BPM | SYSTOLIC BLOOD PRESSURE: 160 MMHG

## 2022-03-11 DIAGNOSIS — R22.31 ARM MASS, RIGHT: Primary | ICD-10-CM

## 2022-03-11 PROCEDURE — 4010F PR ACE/ARB THEARPY RXD/TAKEN: ICD-10-PCS | Mod: CPTII,S$GLB,, | Performed by: SURGERY

## 2022-03-11 PROCEDURE — 3008F BODY MASS INDEX DOCD: CPT | Mod: CPTII,S$GLB,, | Performed by: SURGERY

## 2022-03-11 PROCEDURE — 1159F PR MEDICATION LIST DOCUMENTED IN MEDICAL RECORD: ICD-10-PCS | Mod: CPTII,S$GLB,, | Performed by: SURGERY

## 2022-03-11 PROCEDURE — 3008F PR BODY MASS INDEX (BMI) DOCUMENTED: ICD-10-PCS | Mod: CPTII,S$GLB,, | Performed by: SURGERY

## 2022-03-11 PROCEDURE — 1125F AMNT PAIN NOTED PAIN PRSNT: CPT | Mod: CPTII,S$GLB,, | Performed by: SURGERY

## 2022-03-11 PROCEDURE — 1101F PR PT FALLS ASSESS DOC 0-1 FALLS W/OUT INJ PAST YR: ICD-10-PCS | Mod: CPTII,S$GLB,, | Performed by: SURGERY

## 2022-03-11 PROCEDURE — 1159F MED LIST DOCD IN RCRD: CPT | Mod: CPTII,S$GLB,, | Performed by: SURGERY

## 2022-03-11 PROCEDURE — 3288F PR FALLS RISK ASSESSMENT DOCUMENTED: ICD-10-PCS | Mod: CPTII,S$GLB,, | Performed by: SURGERY

## 2022-03-11 PROCEDURE — 4010F ACE/ARB THERAPY RXD/TAKEN: CPT | Mod: CPTII,S$GLB,, | Performed by: SURGERY

## 2022-03-11 PROCEDURE — 3077F SYST BP >= 140 MM HG: CPT | Mod: CPTII,S$GLB,, | Performed by: SURGERY

## 2022-03-11 PROCEDURE — 3078F DIAST BP <80 MM HG: CPT | Mod: CPTII,S$GLB,, | Performed by: SURGERY

## 2022-03-11 PROCEDURE — 99024 POSTOP FOLLOW-UP VISIT: CPT | Mod: S$GLB,,, | Performed by: SURGERY

## 2022-03-11 PROCEDURE — 3078F PR MOST RECENT DIASTOLIC BLOOD PRESSURE < 80 MM HG: ICD-10-PCS | Mod: CPTII,S$GLB,, | Performed by: SURGERY

## 2022-03-11 PROCEDURE — 3077F PR MOST RECENT SYSTOLIC BLOOD PRESSURE >= 140 MM HG: ICD-10-PCS | Mod: CPTII,S$GLB,, | Performed by: SURGERY

## 2022-03-11 PROCEDURE — 3288F FALL RISK ASSESSMENT DOCD: CPT | Mod: CPTII,S$GLB,, | Performed by: SURGERY

## 2022-03-11 PROCEDURE — 1101F PT FALLS ASSESS-DOCD LE1/YR: CPT | Mod: CPTII,S$GLB,, | Performed by: SURGERY

## 2022-03-11 PROCEDURE — 1125F PR PAIN SEVERITY QUANTIFIED, PAIN PRESENT: ICD-10-PCS | Mod: CPTII,S$GLB,, | Performed by: SURGERY

## 2022-03-11 PROCEDURE — 99024 PR POST-OP FOLLOW-UP VISIT: ICD-10-PCS | Mod: S$GLB,,, | Performed by: SURGERY

## 2022-03-11 RX ORDER — LIDOCAINE 50 MG/G
1 PATCH TOPICAL DAILY
Qty: 15 PATCH | Refills: 5 | Status: SHIPPED | OUTPATIENT
Start: 2022-03-11

## 2022-03-11 RX ORDER — METFORMIN HYDROCHLORIDE 500 MG/1
500 TABLET ORAL 2 TIMES DAILY
COMMUNITY
Start: 2022-02-16

## 2022-03-11 RX ORDER — LIDOCAINE 560 MG/1
1 PATCH PERCUTANEOUS; TOPICAL; TRANSDERMAL DAILY
Qty: 10 PATCH | Refills: 5 | Status: SHIPPED | OUTPATIENT
Start: 2022-03-11

## 2022-03-11 NOTE — PROGRESS NOTES
Subjective:       Patient ID: Mariangel Moreau is a 76 y.o. female.    Chief Complaint: Follow-up    HPI 75 yo female s/p excision of arm mass here for follow up with some c/o arm and shoulder pain  Review of Systems      Objective:      Physical Exam  Vitals reviewed.   Constitutional:       Appearance: She is well-developed.   HENT:      Head: Normocephalic and atraumatic.      Right Ear: External ear normal.      Left Ear: External ear normal.      Nose: Nose normal.   Eyes:      Conjunctiva/sclera: Conjunctivae normal.      Pupils: Pupils are equal, round, and reactive to light.   Cardiovascular:      Rate and Rhythm: Normal rate and regular rhythm.      Heart sounds: Normal heart sounds.   Pulmonary:      Effort: Pulmonary effort is normal.      Breath sounds: Normal breath sounds.   Abdominal:      General: Bowel sounds are normal.      Palpations: Abdomen is soft.   Musculoskeletal:         General: Normal range of motion.        Arms:       Cervical back: Normal range of motion and neck supple.   Skin:     General: Skin is warm and dry.   Neurological:      Mental Status: She is alert and oriented to person, place, and time.      Deep Tendon Reflexes: Reflexes are normal and symmetric.   Psychiatric:         Behavior: Behavior normal.         Thought Content: Thought content normal.         Assessment:       Problem List Items Addressed This Visit     Arm mass, right - Primary        healing well  Plan:       I discussed the findings and expectant wound care and I will see her back prn

## 2022-03-18 ENCOUNTER — OFFICE VISIT (OUTPATIENT)
Dept: RHEUMATOLOGY | Facility: CLINIC | Age: 77
End: 2022-03-18
Payer: MEDICARE

## 2022-03-18 ENCOUNTER — LAB VISIT (OUTPATIENT)
Dept: LAB | Facility: HOSPITAL | Age: 77
End: 2022-03-18
Attending: INTERNAL MEDICINE
Payer: MEDICARE

## 2022-03-18 VITALS
HEIGHT: 69 IN | SYSTOLIC BLOOD PRESSURE: 150 MMHG | OXYGEN SATURATION: 98 % | WEIGHT: 192.25 LBS | HEART RATE: 87 BPM | RESPIRATION RATE: 18 BRPM | BODY MASS INDEX: 28.47 KG/M2 | DIASTOLIC BLOOD PRESSURE: 74 MMHG

## 2022-03-18 DIAGNOSIS — M15.9 PRIMARY OSTEOARTHRITIS INVOLVING MULTIPLE JOINTS: Primary | ICD-10-CM

## 2022-03-18 DIAGNOSIS — Z71.89 COUNSELING AND COORDINATION OF CARE: ICD-10-CM

## 2022-03-18 DIAGNOSIS — Z86.2 HISTORY OF SARCOIDOSIS: ICD-10-CM

## 2022-03-18 DIAGNOSIS — Z79.1 NSAID LONG-TERM USE: ICD-10-CM

## 2022-03-18 DIAGNOSIS — M79.18 MYALGIA, OTHER SITE: ICD-10-CM

## 2022-03-18 DIAGNOSIS — M15.9 PRIMARY OSTEOARTHRITIS INVOLVING MULTIPLE JOINTS: ICD-10-CM

## 2022-03-18 DIAGNOSIS — M72.0 PALMAR FASCIITIS: ICD-10-CM

## 2022-03-18 LAB
25(OH)D3+25(OH)D2 SERPL-MCNC: 35 NG/ML (ref 30–96)
ALBUMIN SERPL BCP-MCNC: 3 G/DL (ref 3.5–5.2)
ALP SERPL-CCNC: 79 U/L (ref 55–135)
ALT SERPL W/O P-5'-P-CCNC: 15 U/L (ref 10–44)
ANION GAP SERPL CALC-SCNC: 11 MMOL/L (ref 8–16)
AST SERPL-CCNC: 11 U/L (ref 10–40)
BASOPHILS # BLD AUTO: 0.04 K/UL (ref 0–0.2)
BASOPHILS NFR BLD: 0.4 % (ref 0–1.9)
BILIRUB SERPL-MCNC: 0.3 MG/DL (ref 0.1–1)
BUN SERPL-MCNC: 14 MG/DL (ref 8–23)
CALCIUM SERPL-MCNC: 9.5 MG/DL (ref 8.7–10.5)
CCP AB SER IA-ACNC: <0.5 U/ML
CHLORIDE SERPL-SCNC: 97 MMOL/L (ref 95–110)
CK SERPL-CCNC: 37 U/L (ref 20–180)
CO2 SERPL-SCNC: 28 MMOL/L (ref 23–29)
CREAT SERPL-MCNC: 0.9 MG/DL (ref 0.5–1.4)
CRP SERPL-MCNC: 134.5 MG/L (ref 0–8.2)
DIFFERENTIAL METHOD: ABNORMAL
EOSINOPHIL # BLD AUTO: 0 K/UL (ref 0–0.5)
EOSINOPHIL NFR BLD: 0.4 % (ref 0–8)
ERYTHROCYTE [DISTWIDTH] IN BLOOD BY AUTOMATED COUNT: 16.3 % (ref 11.5–14.5)
ERYTHROCYTE [SEDIMENTATION RATE] IN BLOOD BY WESTERGREN METHOD: 80 MM/HR (ref 0–20)
EST. GFR  (AFRICAN AMERICAN): >60 ML/MIN/1.73 M^2
EST. GFR  (NON AFRICAN AMERICAN): >60 ML/MIN/1.73 M^2
GLUCOSE SERPL-MCNC: 275 MG/DL (ref 70–110)
HCT VFR BLD AUTO: 33.8 % (ref 37–48.5)
HGB BLD-MCNC: 9.9 G/DL (ref 12–16)
IMM GRANULOCYTES # BLD AUTO: 0.05 K/UL (ref 0–0.04)
IMM GRANULOCYTES NFR BLD AUTO: 0.5 % (ref 0–0.5)
LYMPHOCYTES # BLD AUTO: 2.1 K/UL (ref 1–4.8)
LYMPHOCYTES NFR BLD: 21 % (ref 18–48)
MCH RBC QN AUTO: 22.3 PG (ref 27–31)
MCHC RBC AUTO-ENTMCNC: 29.3 G/DL (ref 32–36)
MCV RBC AUTO: 76 FL (ref 82–98)
MONOCYTES # BLD AUTO: 0.6 K/UL (ref 0.3–1)
MONOCYTES NFR BLD: 6.1 % (ref 4–15)
NEUTROPHILS # BLD AUTO: 7.2 K/UL (ref 1.8–7.7)
NEUTROPHILS NFR BLD: 71.6 % (ref 38–73)
NRBC BLD-RTO: 0 /100 WBC
PLATELET # BLD AUTO: 414 K/UL (ref 150–450)
PMV BLD AUTO: 10.2 FL (ref 9.2–12.9)
POTASSIUM SERPL-SCNC: 4.6 MMOL/L (ref 3.5–5.1)
PROT SERPL-MCNC: 7.6 G/DL (ref 6–8.4)
RBC # BLD AUTO: 4.43 M/UL (ref 4–5.4)
RHEUMATOID FACT SERPL-ACNC: 13 IU/ML (ref 0–15)
SODIUM SERPL-SCNC: 136 MMOL/L (ref 136–145)
TSH SERPL DL<=0.005 MIU/L-ACNC: 0.74 UIU/ML (ref 0.4–4)
URATE SERPL-MCNC: 4.1 MG/DL (ref 2.4–5.7)
WBC # BLD AUTO: 10.03 K/UL (ref 3.9–12.7)

## 2022-03-18 PROCEDURE — 80053 COMPREHEN METABOLIC PANEL: CPT | Performed by: INTERNAL MEDICINE

## 2022-03-18 PROCEDURE — 3008F BODY MASS INDEX DOCD: CPT | Mod: CPTII,S$GLB,, | Performed by: INTERNAL MEDICINE

## 2022-03-18 PROCEDURE — 82652 VIT D 1 25-DIHYDROXY: CPT | Performed by: INTERNAL MEDICINE

## 2022-03-18 PROCEDURE — 1125F PR PAIN SEVERITY QUANTIFIED, PAIN PRESENT: ICD-10-PCS | Mod: CPTII,S$GLB,, | Performed by: INTERNAL MEDICINE

## 2022-03-18 PROCEDURE — 3288F PR FALLS RISK ASSESSMENT DOCUMENTED: ICD-10-PCS | Mod: CPTII,S$GLB,, | Performed by: INTERNAL MEDICINE

## 2022-03-18 PROCEDURE — 82306 VITAMIN D 25 HYDROXY: CPT | Performed by: INTERNAL MEDICINE

## 2022-03-18 PROCEDURE — 82550 ASSAY OF CK (CPK): CPT | Performed by: INTERNAL MEDICINE

## 2022-03-18 PROCEDURE — 86706 HEP B SURFACE ANTIBODY: CPT | Performed by: INTERNAL MEDICINE

## 2022-03-18 PROCEDURE — 3077F SYST BP >= 140 MM HG: CPT | Mod: CPTII,S$GLB,, | Performed by: INTERNAL MEDICINE

## 2022-03-18 PROCEDURE — 99204 PR OFFICE/OUTPT VISIT, NEW, LEVL IV, 45-59 MIN: ICD-10-PCS | Mod: S$GLB,,, | Performed by: INTERNAL MEDICINE

## 2022-03-18 PROCEDURE — 83520 IMMUNOASSAY QUANT NOS NONAB: CPT | Performed by: INTERNAL MEDICINE

## 2022-03-18 PROCEDURE — 4010F ACE/ARB THERAPY RXD/TAKEN: CPT | Mod: CPTII,S$GLB,, | Performed by: INTERNAL MEDICINE

## 2022-03-18 PROCEDURE — 87389 HIV-1 AG W/HIV-1&-2 AB AG IA: CPT | Performed by: INTERNAL MEDICINE

## 2022-03-18 PROCEDURE — 84550 ASSAY OF BLOOD/URIC ACID: CPT | Performed by: INTERNAL MEDICINE

## 2022-03-18 PROCEDURE — 86200 CCP ANTIBODY: CPT | Performed by: INTERNAL MEDICINE

## 2022-03-18 PROCEDURE — 1101F PR PT FALLS ASSESS DOC 0-1 FALLS W/OUT INJ PAST YR: ICD-10-PCS | Mod: CPTII,S$GLB,, | Performed by: INTERNAL MEDICINE

## 2022-03-18 PROCEDURE — 99204 OFFICE O/P NEW MOD 45 MIN: CPT | Mod: S$GLB,,, | Performed by: INTERNAL MEDICINE

## 2022-03-18 PROCEDURE — 86038 ANTINUCLEAR ANTIBODIES: CPT | Performed by: INTERNAL MEDICINE

## 2022-03-18 PROCEDURE — 1159F PR MEDICATION LIST DOCUMENTED IN MEDICAL RECORD: ICD-10-PCS | Mod: CPTII,S$GLB,, | Performed by: INTERNAL MEDICINE

## 2022-03-18 PROCEDURE — 3008F PR BODY MASS INDEX (BMI) DOCUMENTED: ICD-10-PCS | Mod: CPTII,S$GLB,, | Performed by: INTERNAL MEDICINE

## 2022-03-18 PROCEDURE — 82164 ANGIOTENSIN I ENZYME TEST: CPT | Performed by: INTERNAL MEDICINE

## 2022-03-18 PROCEDURE — 1159F MED LIST DOCD IN RCRD: CPT | Mod: CPTII,S$GLB,, | Performed by: INTERNAL MEDICINE

## 2022-03-18 PROCEDURE — 3288F FALL RISK ASSESSMENT DOCD: CPT | Mod: CPTII,S$GLB,, | Performed by: INTERNAL MEDICINE

## 2022-03-18 PROCEDURE — 86803 HEPATITIS C AB TEST: CPT | Performed by: INTERNAL MEDICINE

## 2022-03-18 PROCEDURE — 1125F AMNT PAIN NOTED PAIN PRSNT: CPT | Mod: CPTII,S$GLB,, | Performed by: INTERNAL MEDICINE

## 2022-03-18 PROCEDURE — 84443 ASSAY THYROID STIM HORMONE: CPT | Performed by: INTERNAL MEDICINE

## 2022-03-18 PROCEDURE — 1101F PT FALLS ASSESS-DOCD LE1/YR: CPT | Mod: CPTII,S$GLB,, | Performed by: INTERNAL MEDICINE

## 2022-03-18 PROCEDURE — 86431 RHEUMATOID FACTOR QUANT: CPT | Performed by: INTERNAL MEDICINE

## 2022-03-18 PROCEDURE — 85025 COMPLETE CBC W/AUTO DIFF WBC: CPT | Performed by: INTERNAL MEDICINE

## 2022-03-18 PROCEDURE — 87340 HEPATITIS B SURFACE AG IA: CPT | Performed by: INTERNAL MEDICINE

## 2022-03-18 PROCEDURE — 4010F PR ACE/ARB THEARPY RXD/TAKEN: ICD-10-PCS | Mod: CPTII,S$GLB,, | Performed by: INTERNAL MEDICINE

## 2022-03-18 PROCEDURE — 85652 RBC SED RATE AUTOMATED: CPT | Performed by: INTERNAL MEDICINE

## 2022-03-18 PROCEDURE — 3078F DIAST BP <80 MM HG: CPT | Mod: CPTII,S$GLB,, | Performed by: INTERNAL MEDICINE

## 2022-03-18 PROCEDURE — 86140 C-REACTIVE PROTEIN: CPT | Performed by: INTERNAL MEDICINE

## 2022-03-18 PROCEDURE — 99999 PR PBB SHADOW E&M-EST. PATIENT-LVL IV: ICD-10-PCS | Mod: PBBFAC,,, | Performed by: INTERNAL MEDICINE

## 2022-03-18 PROCEDURE — 3078F PR MOST RECENT DIASTOLIC BLOOD PRESSURE < 80 MM HG: ICD-10-PCS | Mod: CPTII,S$GLB,, | Performed by: INTERNAL MEDICINE

## 2022-03-18 PROCEDURE — 99999 PR PBB SHADOW E&M-EST. PATIENT-LVL IV: CPT | Mod: PBBFAC,,, | Performed by: INTERNAL MEDICINE

## 2022-03-18 PROCEDURE — 3077F PR MOST RECENT SYSTOLIC BLOOD PRESSURE >= 140 MM HG: ICD-10-PCS | Mod: CPTII,S$GLB,, | Performed by: INTERNAL MEDICINE

## 2022-03-18 RX ORDER — AMITRIPTYLINE HYDROCHLORIDE 10 MG/1
10 TABLET, FILM COATED ORAL NIGHTLY
Qty: 30 TABLET | Refills: 11 | Status: SHIPPED | OUTPATIENT
Start: 2022-03-18 | End: 2022-03-30

## 2022-03-18 NOTE — PROGRESS NOTES
RHEUMATOLOGY OUTPATIENT CLINIC NOTE    3/18/2022    Attending Rheumatologist: Pieter Santillan  Primary Care Provider: Jorge Sahni NP   Physician Requesting Consultation: Pepe Gutierres MD  [address] [place] [address]  Chief Complaint/Reason For Consultation:  No chief complaint on file.      Subjective:       HPI  Mariangel Moreau is a 76 y.o. Black or  female with a medical history noted who presents for evaluation of joint pain.    Patient presents for evaluation of joint pain noting she has joint pain for many years.  She notes her worst areas are her hands, knees, and shoulders.  She reports about 30 minutes of morning stiffness.  Denies any swelling.  Notes the pain is typically worse with activity and improves with rest.  Reports paresthesias coming down from her shoulders to her hands.  Patient endorses fatigue and headaches.  Denies weight loss, night sweats, fevers, cough, rash or shortness of breath.  Of note patient reports history of sarcoid.    Review of Systems   Constitutional: Positive for fatigue. Negative for chills, fever and unexpected weight change.   HENT: Negative for mouth sores.    Eyes: Negative for redness and eye dryness.   Respiratory: Negative for cough and shortness of breath.    Cardiovascular: Negative for chest pain.   Gastrointestinal: Negative for abdominal distention, constipation, diarrhea, nausea and vomiting.   Genitourinary: Negative for vaginal dryness.   Musculoskeletal: Positive for arthralgias, back pain, leg pain and myalgias. Negative for gait problem, joint swelling, neck pain, neck stiffness and joint deformity.   Integumentary:  Negative for rash.   Neurological: Positive for numbness and headaches. Negative for weakness.   Hematological: Negative for adenopathy. Does not bruise/bleed easily.   Psychiatric/Behavioral: Positive for sleep disturbance. Negative for confusion and decreased concentration. The patient is not  "nervous/anxious.    All other systems reviewed and are negative.       Chronic comorbid conditions affecting medical decision making today:  Past Medical History:   Diagnosis Date    Aortic atherosclerosis     Diabetes mellitus     High cholesterol     Hypertension     Sarcoidosis      Past Surgical History:   Procedure Laterality Date    CHOLECYSTECTOMY      COLONOSCOPY N/A 9/13/2019    Procedure: COLONOSCOPY;  Surgeon: Miguelito Garcia MD;  Location: [address]);  Service: Endoscopy;  Laterality: N/A;  Referral from SABRINA Gonzalez, [facility]  Ht: 5'9"  Wt: 180 lbs BMI: 26.2    SURGICAL REMOVAL OF MASS OF UPPER EXTREMITY Right 3/4/2022    Procedure: EXCISION, MASS, UPPER EXTREMITY;  Surgeon: Miller Saravia MD;  Location: [facility];  Service: General;  Laterality: Right;  CALLED AND SPOKE TO DAUGHTER ON 2/25/2022. DAUGHTER WILL CALL BACK IN 1 HOUR  WITH MOM AND BOOK PRE OP APPOINTMENT. 12:32PMJIGNA  RN PREOP ON 3/3/22; COVID NEGATIVE     No family history on file.  Social History     Substance and Sexual Activity   Alcohol Use No     Social History     Tobacco Use   Smoking Status Former Smoker    Types: Cigarettes   Smokeless Tobacco Never Used     Social History     Substance and Sexual Activity   Drug Use Never       Current Outpatient Medications:     diclofenac sodium (VOLTAREN) 1 % Gel, Apply 2 g topically 4 (four) times daily as needed., Disp: 100 g, Rfl: 0    ergocalciferol (ERGOCALCIFEROL) 50,000 unit Cap, Take 50,000 Units by mouth every 7 days., Disp: , Rfl:     fluconazole (DIFLUCAN) 150 MG Tab, Take 150 mg by mouth once as needed., Disp: , Rfl:     glipiZIDE (GLUCOTROL) 10 MG TR24, , Disp: , Rfl:     ibuprofen (ADVIL,MOTRIN) 600 MG tablet, Take 1 tablet (600 mg total) by mouth 3 (three) times daily., Disp: 30 tablet, Rfl: 1    LIDOcaine (LIDODERM) 5 %, Place 1 patch onto the skin once daily. Remove & Discard patch within 12 hours or as directed by MD, Disp: 15 patch, Rfl: 5    " LIDOcaine (SALONPAS, LIDOCAINE,) 4 % PtMd, Apply 1 patch topically once daily., Disp: 10 patch, Rfl: 5    lisinopril (PRINIVIL,ZESTRIL) 40 MG tablet, Take 40 mg by mouth once daily., Disp: , Rfl:     metFORMIN (GLUCOPHAGE) 500 MG tablet, Take 500 mg by mouth 2 (two) times daily., Disp: , Rfl:     multivitamin capsule, Take 1 capsule by mouth once daily., Disp: , Rfl:     amitriptyline (ELAVIL) 10 MG tablet, Take 1 tablet (10 mg total) by mouth every evening., Disp: 30 tablet, Rfl: 11    aspirin (ECOTRIN) 81 MG EC tablet, Take 1 tablet (81 mg total) by mouth once daily. (Patient not taking: Reported on 3/11/2022), Disp: 90 tablet, Rfl: 3    meloxicam (MOBIC) 7.5 MG tablet, Take 7.5 mg by mouth once daily., Disp: , Rfl:     mupirocin (BACTROBAN) 2 % ointment, Use qd on wound (Patient not taking: No sig reported), Disp: 30 g, Rfl: 2     Objective:         Vitals:    03/18/22 0824   BP: (!) 150/74   Pulse: 87   Resp: 18     Physical Exam   Musculoskeletal:      Comments: Cannot make fist, no synovitis  +b/l palmar fascia TTP with A1 Pulley nodule 2/3rd digit   Knee crepitus   Tender Points:  No   Yes  ( )    (x )   Low cervical anterior aspect    ( )    (x )   Costochondral Junction   ( )    (x )   Lateral Epicondyle  (x )    ( )   Suboccipital   (x )    ( )   Trapezius   (x )    ( )   Supraspinatus   (x )    ( )   Gluteal   (x )    ( )   Greater trochanter  ( )    (x )   Knee           Reviewed old and all outside pertinent medical records available.    All lab results personally reviewed and interpreted by me.  Lab Results   Component Value Date    WBC 8.58 03/03/2022    HGB 10.6 (L) 03/03/2022    HCT 35.3 (L) 03/03/2022    MCV 76 (L) 03/03/2022    MCH 22.8 (L) 03/03/2022    MCHC 30.0 (L) 03/03/2022    RDW 17.0 (H) 03/03/2022     03/03/2022    MPV 9.7 03/03/2022    PLTEST Appears normal 05/07/2015       Lab Results   Component Value Date     (L) 03/03/2022    K 4.3 03/03/2022    CL 98 03/03/2022     CO2 29 03/03/2022     (H) 03/03/2022    BUN 14 03/03/2022    CALCIUM 9.2 03/03/2022    PROT 7.3 03/03/2022    ALBUMIN 3.2 (L) 03/03/2022    BILITOT 0.2 03/03/2022    AST 7 (L) 03/03/2022    ALKPHOS 79 03/03/2022    ALT 12 03/03/2022       Lab Results   Component Value Date    COLORU yellow 11/11/2019    APPEARANCEUA Hazy (A) 12/29/2017    SPECGRAV 1,000 11/11/2019    PHUR 5 11/11/2019    PROTEINUA 1+ (A) 12/29/2017    KETONESU Negative 11/11/2019    LEUKOCYTESUR Negative 12/29/2017    NITRITE Negative 11/11/2019    UROBILINOGEN Normal 11/11/2019       No results found for: CRP    No results found for: SEDRATE, ERYTHROCYTES    No results found for: LELIA, RF, SEDRATE    No components found for: 25OHVITDTOT, 24OCQNAS5, 86XXBDQZ9, METHODNOTE    No results found for: URICACID    No components found for: TSPOTTB        Imaging:  All imaging reviewed and independently interpreted by me.         ASSESSMENT / PLAN:     Mariangel Moreau is a 76 y.o. Black or  female with:      1. Primary osteoarthritis involving multiple joints  - I believe patients joint pain 2/2 OA   - discussed diagnosis and management  - due to Hx of Sarcoid and family concern of CTD will get work up as below  - wt loss  - continue NSAIDs  - reassurance and exercise   - C-Reactive Protein; Future  - LELIA Screen w/Reflex; Future  - Rheumatoid Factor; Future  - CBC Auto Differential; Future  - Comprehensive Metabolic Panel; Future  - Cyclic Citrullinated Peptide Antibody, IgG; Future  - Sedimentation rate; Future  - Protein/Creatinine Ratio, Urine; Future  - Urinalysis; Future  - Hepatitis B Surface Ab, Qualitative; Future  - Vitamin D; Future  - Uric Acid; Future  - TSH; Future  - Hepatitis B Surface Antigen; Future  - Hepatitis C Antibody; Future  - X-Ray Chest PA And Lateral; Future  - CK; Future  - XR Arthritis Survey; Future  - Quantiferon Gold TB; Future  - HIV 1/2 Ag/Ab (4th Gen); Future  - Interleukin-2 Receptor; Future  -  Angiotensin Converting Enzyme; Future  - Calcitriol; Future  - amitriptyline (ELAVIL) 10 MG tablet; Take 1 tablet (10 mg total) by mouth every evening.  Dispense: 30 tablet; Refill: 11    2. History of sarcoidosis  - patient reports Hx of Sarcoid  - no evidence of active disease, though will get blood work and CXR  - reassurance   - Vitamin D; Future  - TSH; Future  - Calcitriol; Future    3. Palmar fasciitis  - patient with bilateral palmar fascitis and trigger finger  - discussed diagnosis and management  - she has had CSI in past with temporary relief  - advised to Ice hand, use topical and consider trigger finger brace  - following with Hand  - reassurance     4. Chronic NSAID use  - no history of GI bleed or coronary artery disease.  - previous labs without features of CKD.  - clinical significance side effect of long-term NSAID use discussed in detail.  - recommended for alternative therapies for pain management.    5. Other specified counseling  - over 10 minutes spent regarding below topics:  - Immunization counseling done.  - Weight loss counseling done.  - Nutrition and exercise counseling.  - Limitation of alcohol consumption.  - Regular exercise:  Aerobic and resistance.  - Medication counseling provided.      Follow up in about 6 weeks (around 4/29/2022).    Method of contact with patient concerns: [facility]    Disclaimer:  This note is prepared using voice recognition software and as such is likely to have errors and has not been proof read. Please contact me for questions.     Time spent: 45 minutes in face to face discussion concerning diagnosis, prognosis, review of lab and test results, benefits of treatment as well as management of disease, counseling of patient and coordination of care between various health care providers.  Greater than half the time spent was used for coordination of care and counseling of patient.    Pieter Santillan M.D.  Rheumatology Department   [facility]

## 2022-03-21 ENCOUNTER — HOSPITAL ENCOUNTER (OUTPATIENT)
Dept: RADIOLOGY | Facility: HOSPITAL | Age: 77
Discharge: HOME | End: 2022-03-21
Attending: INTERNAL MEDICINE
Payer: MEDICARE

## 2022-03-21 DIAGNOSIS — M15.9 PRIMARY OSTEOARTHRITIS INVOLVING MULTIPLE JOINTS: ICD-10-CM

## 2022-03-21 LAB
ACE SERPL-CCNC: <5 U/L (ref 16–85)
ANA SER QL IF: NORMAL
HBV SURFACE AB SER-ACNC: NEGATIVE M[IU]/ML
HBV SURFACE AG SERPL QL IA: NEGATIVE
HCV AB SERPL QL IA: NEGATIVE

## 2022-03-21 PROCEDURE — 71046 X-RAY EXAM CHEST 2 VIEWS: CPT | Mod: TC,FY

## 2022-03-21 PROCEDURE — 71046 X-RAY EXAM CHEST 2 VIEWS: CPT | Mod: 26,,, | Performed by: RADIOLOGY

## 2022-03-21 PROCEDURE — 77077 JOINT SURVEY SINGLE VIEW: CPT | Mod: 26,,, | Performed by: RADIOLOGY

## 2022-03-21 PROCEDURE — 71046 XR CHEST PA AND LATERAL: ICD-10-PCS | Mod: 26,,, | Performed by: RADIOLOGY

## 2022-03-21 PROCEDURE — 77077 XR ARTHRITIS SURVEY: ICD-10-PCS | Mod: 26,,, | Performed by: RADIOLOGY

## 2022-03-21 PROCEDURE — 77077 JOINT SURVEY SINGLE VIEW: CPT | Mod: TC

## 2022-03-22 LAB — HIV 1+2 AB+HIV1 P24 AG SERPL QL IA: NEGATIVE

## 2022-03-23 LAB
1,25(OH)2D3 SERPL-MCNC: 63 PG/ML (ref 20–79)
SOL IL2 RECEP SERPL-MCNC: 711.1 PG/ML (ref 175.3–858.2)

## 2022-03-25 ENCOUNTER — TELEPHONE (OUTPATIENT)
Dept: RHEUMATOLOGY | Facility: CLINIC | Age: 77
End: 2022-03-25
Payer: MEDICARE

## 2022-03-25 NOTE — TELEPHONE ENCOUNTER
----- Message from Terrie Yoo sent at 3/25/2022  2:41 PM CDT -----  Regarding: self [phone]  Type: Patient Call Back    Who called: self    What is the request in detail: Patient is having pain and is in need of something for pain.     Can the clinic reply by [facility]? no    Would the patient rather a call back or a response via [facility]?  Call back    Best call back number: [phone]      [facility] - [place], [place] - [address] [place] [address]  Phone: [phone] Fax: [phone]

## 2022-03-30 ENCOUNTER — OFFICE VISIT (OUTPATIENT)
Dept: RHEUMATOLOGY | Facility: CLINIC | Age: 77
End: 2022-03-30
Payer: MEDICARE

## 2022-03-30 VITALS
SYSTOLIC BLOOD PRESSURE: 138 MMHG | HEART RATE: 89 BPM | DIASTOLIC BLOOD PRESSURE: 78 MMHG | OXYGEN SATURATION: 97 % | RESPIRATION RATE: 18 BRPM

## 2022-03-30 DIAGNOSIS — M35.3 PMR (POLYMYALGIA RHEUMATICA): Primary | ICD-10-CM

## 2022-03-30 DIAGNOSIS — M65.322 TRIGGER INDEX FINGER OF LEFT HAND: ICD-10-CM

## 2022-03-30 DIAGNOSIS — Z86.2 HISTORY OF SARCOIDOSIS: ICD-10-CM

## 2022-03-30 DIAGNOSIS — Z79.52 LONG TERM SYSTEMIC STEROID USER: ICD-10-CM

## 2022-03-30 DIAGNOSIS — Z71.89 COUNSELING AND COORDINATION OF CARE: ICD-10-CM

## 2022-03-30 DIAGNOSIS — M75.51 SUBACROMIAL BURSITIS OF RIGHT SHOULDER JOINT: ICD-10-CM

## 2022-03-30 DIAGNOSIS — M72.0 PALMAR FASCIITIS: ICD-10-CM

## 2022-03-30 DIAGNOSIS — M15.9 PRIMARY OSTEOARTHRITIS INVOLVING MULTIPLE JOINTS: ICD-10-CM

## 2022-03-30 PROCEDURE — 4010F PR ACE/ARB THEARPY RXD/TAKEN: ICD-10-PCS | Mod: CPTII,S$GLB,, | Performed by: INTERNAL MEDICINE

## 2022-03-30 PROCEDURE — 3078F PR MOST RECENT DIASTOLIC BLOOD PRESSURE < 80 MM HG: ICD-10-PCS | Mod: CPTII,S$GLB,, | Performed by: INTERNAL MEDICINE

## 2022-03-30 PROCEDURE — 20550 NJX 1 TENDON SHEATH/LIGAMENT: CPT | Mod: XS,LT,S$GLB, | Performed by: INTERNAL MEDICINE

## 2022-03-30 PROCEDURE — 20610 LARGE JOINT ASPIRATION/INJECTION: R SUBACROMIAL BURSA: ICD-10-PCS | Mod: RT,S$GLB,, | Performed by: INTERNAL MEDICINE

## 2022-03-30 PROCEDURE — 1125F PR PAIN SEVERITY QUANTIFIED, PAIN PRESENT: ICD-10-PCS | Mod: CPTII,S$GLB,, | Performed by: INTERNAL MEDICINE

## 2022-03-30 PROCEDURE — 1159F PR MEDICATION LIST DOCUMENTED IN MEDICAL RECORD: ICD-10-PCS | Mod: CPTII,S$GLB,, | Performed by: INTERNAL MEDICINE

## 2022-03-30 PROCEDURE — 1101F PT FALLS ASSESS-DOCD LE1/YR: CPT | Mod: CPTII,S$GLB,, | Performed by: INTERNAL MEDICINE

## 2022-03-30 PROCEDURE — 99215 OFFICE O/P EST HI 40 MIN: CPT | Mod: 25,S$GLB,, | Performed by: INTERNAL MEDICINE

## 2022-03-30 PROCEDURE — 99999 PR PBB SHADOW E&M-EST. PATIENT-LVL III: CPT | Mod: PBBFAC,,, | Performed by: INTERNAL MEDICINE

## 2022-03-30 PROCEDURE — 3075F SYST BP GE 130 - 139MM HG: CPT | Mod: CPTII,S$GLB,, | Performed by: INTERNAL MEDICINE

## 2022-03-30 PROCEDURE — 20550 TENDON SHEATH: ICD-10-PCS | Mod: XS,LT,S$GLB, | Performed by: INTERNAL MEDICINE

## 2022-03-30 PROCEDURE — 99215 PR OFFICE/OUTPT VISIT, EST, LEVL V, 40-54 MIN: ICD-10-PCS | Mod: 25,S$GLB,, | Performed by: INTERNAL MEDICINE

## 2022-03-30 PROCEDURE — 99999 PR PBB SHADOW E&M-EST. PATIENT-LVL III: ICD-10-PCS | Mod: PBBFAC,,, | Performed by: INTERNAL MEDICINE

## 2022-03-30 PROCEDURE — 3288F FALL RISK ASSESSMENT DOCD: CPT | Mod: CPTII,S$GLB,, | Performed by: INTERNAL MEDICINE

## 2022-03-30 PROCEDURE — 1101F PR PT FALLS ASSESS DOC 0-1 FALLS W/OUT INJ PAST YR: ICD-10-PCS | Mod: CPTII,S$GLB,, | Performed by: INTERNAL MEDICINE

## 2022-03-30 PROCEDURE — 20610 DRAIN/INJ JOINT/BURSA W/O US: CPT | Mod: RT,S$GLB,, | Performed by: INTERNAL MEDICINE

## 2022-03-30 PROCEDURE — 3078F DIAST BP <80 MM HG: CPT | Mod: CPTII,S$GLB,, | Performed by: INTERNAL MEDICINE

## 2022-03-30 PROCEDURE — 1159F MED LIST DOCD IN RCRD: CPT | Mod: CPTII,S$GLB,, | Performed by: INTERNAL MEDICINE

## 2022-03-30 PROCEDURE — 3075F PR MOST RECENT SYSTOLIC BLOOD PRESS GE 130-139MM HG: ICD-10-PCS | Mod: CPTII,S$GLB,, | Performed by: INTERNAL MEDICINE

## 2022-03-30 PROCEDURE — 4010F ACE/ARB THERAPY RXD/TAKEN: CPT | Mod: CPTII,S$GLB,, | Performed by: INTERNAL MEDICINE

## 2022-03-30 PROCEDURE — 3288F PR FALLS RISK ASSESSMENT DOCUMENTED: ICD-10-PCS | Mod: CPTII,S$GLB,, | Performed by: INTERNAL MEDICINE

## 2022-03-30 PROCEDURE — 1125F AMNT PAIN NOTED PAIN PRSNT: CPT | Mod: CPTII,S$GLB,, | Performed by: INTERNAL MEDICINE

## 2022-03-30 RX ORDER — LIDOCAINE HYDROCHLORIDE 10 MG/ML
2 INJECTION INFILTRATION; PERINEURAL
Status: DISCONTINUED | OUTPATIENT
Start: 2022-03-30 | End: 2022-03-30 | Stop reason: HOSPADM

## 2022-03-30 RX ORDER — PREDNISONE 5 MG/1
TABLET ORAL
Qty: 98 TABLET | Refills: 0 | Status: SHIPPED | OUTPATIENT
Start: 2022-03-30 | End: 2022-05-05

## 2022-03-30 RX ORDER — LIDOCAINE HYDROCHLORIDE 10 MG/ML
1 INJECTION INFILTRATION; PERINEURAL
Status: DISCONTINUED | OUTPATIENT
Start: 2022-03-30 | End: 2022-03-30 | Stop reason: HOSPADM

## 2022-03-30 RX ORDER — TRIAMCINOLONE ACETONIDE 40 MG/ML
40 INJECTION, SUSPENSION INTRA-ARTICULAR; INTRAMUSCULAR
Status: DISCONTINUED | OUTPATIENT
Start: 2022-03-30 | End: 2022-03-30 | Stop reason: HOSPADM

## 2022-03-30 RX ORDER — LORAZEPAM 0.5 MG/1
1 TABLET ORAL NIGHTLY
Qty: 60 TABLET | Refills: 0 | Status: SHIPPED | OUTPATIENT
Start: 2022-03-30 | End: 2022-04-29

## 2022-03-30 RX ADMIN — TRIAMCINOLONE ACETONIDE 40 MG: 40 INJECTION, SUSPENSION INTRA-ARTICULAR; INTRAMUSCULAR at 08:03

## 2022-03-30 RX ADMIN — LIDOCAINE HYDROCHLORIDE 1 ML: 10 INJECTION INFILTRATION; PERINEURAL at 08:03

## 2022-03-30 RX ADMIN — LIDOCAINE HYDROCHLORIDE 2 ML: 10 INJECTION INFILTRATION; PERINEURAL at 08:03

## 2022-03-30 NOTE — PROGRESS NOTES
RHEUMATOLOGY OUTPATIENT CLINIC NOTE    3/30/2022    Attending Rheumatologist: Pieter Santillan  Primary Care Provider: Jorge Sahni NP   Physician Requesting Consultation: No referring provider defined for this encounter.  Chief Complaint/Reason For Consultation:  No chief complaint on file.      Subjective:       MICHAEL Moreau is a 76 y.o. Black or  female with medical history noted below who presents for evaluation of joint pain.   Patient presents for evaluation of joint pain noting she has joint pain for many years.  She notes her worst areas are her hands, knees, and shoulders.  She reports about 30 minutes of morning stiffness.  Denies any swelling.  Notes the pain is typically worse with activity and improves with rest.  Reports paresthesias coming down from her shoulders to her hands.  Patient endorses fatigue and headaches.  Denies weight loss, night sweats, fevers, cough, rash or shortness of breath.  Of note patient reports history of sarcoid.    Today  Patient here for follow up sooner than expected.   Last visit work up obtained to determine etiology of pain. Work up showed OA on imaging, elevated ESR/CRP. She was given Elavil to help with sleep and NSAIDs continued. She notes Elavil not helping with sleep, and pain is unbearable, though she is not taking the NSAIDs. She notes pain with stiffness in her shoulders and hips, also notes knee pain, and hand pain. Notes the hand pain limits her activities and is very bothersome. +fatigue, myalgias, HA, paraesthesias, poor sleep, anxiety still present.    Review of Systems   Constitutional: Positive for fatigue. Negative for chills, fever and unexpected weight change.   HENT: Negative for mouth sores.    Eyes: Negative for redness and eye dryness.   Respiratory: Negative for cough and shortness of breath.    Cardiovascular: Negative for chest pain.   Gastrointestinal: Negative for abdominal distention, constipation, diarrhea,  "nausea and vomiting.   Genitourinary: Negative for vaginal dryness.   Musculoskeletal: Positive for arthralgias, joint swelling, leg pain, myalgias and neck pain. Negative for back pain, gait problem, neck stiffness and joint deformity.   Integumentary:  Negative for rash.   Neurological: Positive for numbness and headaches. Negative for weakness.   Hematological: Negative for adenopathy. Does not bruise/bleed easily.   Psychiatric/Behavioral: Positive for sleep disturbance. Negative for confusion and decreased concentration. The patient is nervous/anxious.    All other systems reviewed and are negative.       Chronic comorbid conditions affecting medical decision making today:  Past Medical History:   Diagnosis Date    Aortic atherosclerosis     Diabetes mellitus     High cholesterol     Hypertension     Sarcoidosis      Past Surgical History:   Procedure Laterality Date    CHOLECYSTECTOMY      COLONOSCOPY N/A 9/13/2019    Procedure: COLONOSCOPY;  Surgeon: Miguelito Garcia MD;  Location: [address]);  Service: Endoscopy;  Laterality: N/A;  Referral from SABRINA Gonzalez, [facility]  Ht: 5'9"  Wt: 180 lbs BMI: 26.2    SURGICAL REMOVAL OF MASS OF UPPER EXTREMITY Right 3/4/2022    Procedure: EXCISION, MASS, UPPER EXTREMITY;  Surgeon: Miller Saravia MD;  Location: [facility];  Service: General;  Laterality: Right;  CALLED AND SPOKE TO DAUGHTER ON 2/25/2022. DAUGHTER WILL CALL BACK IN 1 HOUR  WITH MOM AND BOOK PRE OP APPOINTMENT. 12:32PMJIGNA  RN PREOP ON 3/3/22; COVID NEGATIVE     No family history on file.  Social History     Substance and Sexual Activity   Alcohol Use No     Social History     Tobacco Use   Smoking Status Former Smoker    Types: Cigarettes   Smokeless Tobacco Never Used     Social History     Substance and Sexual Activity   Drug Use Never       Current Outpatient Medications:     aspirin (ECOTRIN) 81 MG EC tablet, Take 1 tablet (81 mg total) by mouth once daily. (Patient not taking: Reported " on 3/11/2022), Disp: 90 tablet, Rfl: 3    diclofenac sodium (VOLTAREN) 1 % Gel, Apply 2 g topically 4 (four) times daily as needed., Disp: 100 g, Rfl: 0    ergocalciferol (ERGOCALCIFEROL) 50,000 unit Cap, Take 50,000 Units by mouth every 7 days., Disp: , Rfl:     fluconazole (DIFLUCAN) 150 MG Tab, Take 150 mg by mouth once as needed., Disp: , Rfl:     glipiZIDE (GLUCOTROL) 10 MG TR24, , Disp: , Rfl:     ibuprofen (ADVIL,MOTRIN) 600 MG tablet, Take 1 tablet (600 mg total) by mouth 3 (three) times daily., Disp: 30 tablet, Rfl: 1    LIDOcaine (LIDODERM) 5 %, Place 1 patch onto the skin once daily. Remove & Discard patch within 12 hours or as directed by MD, Disp: 15 patch, Rfl: 5    LIDOcaine (SALONPAS, LIDOCAINE,) 4 % PtMd, Apply 1 patch topically once daily., Disp: 10 patch, Rfl: 5    lisinopril (PRINIVIL,ZESTRIL) 40 MG tablet, Take 40 mg by mouth once daily., Disp: , Rfl:     LORazepam (ATIVAN) 0.5 MG tablet, Take 2 tablets (1 mg total) by mouth every evening., Disp: 60 tablet, Rfl: 0    meloxicam (MOBIC) 7.5 MG tablet, Take 7.5 mg by mouth once daily., Disp: , Rfl:     metFORMIN (GLUCOPHAGE) 500 MG tablet, Take 500 mg by mouth 2 (two) times daily., Disp: , Rfl:     multivitamin capsule, Take 1 capsule by mouth once daily., Disp: , Rfl:     mupirocin (BACTROBAN) 2 % ointment, Use qd on wound (Patient not taking: No sig reported), Disp: 30 g, Rfl: 2    predniSONE (DELTASONE) 5 MG tablet, Take 3 tablets (15 mg total) by mouth once daily for 14 days, THEN 2 tablets (10 mg total) once daily., Disp: 98 tablet, Rfl: 0     Objective:         Vitals:    03/30/22 0837   BP: 138/78   Pulse: 89   Resp: 18     Physical Exam   Musculoskeletal:      Comments: Cannot make fist, no MCP/PIP/DIP Synovitis  Has palmar fascia TTP along with bilateral A1 2/3rd Nodule  Right subacromial bursitis   Knee crepitus  Tender Points:  No   Yes  ( )    (x )   Low cervical anterior aspect    ( )    (x )   Costochondral Junction   (  )    (x )   Lateral Epicondyle  ( )    (x )   Suboccipital   ( )    (x )   Trapezius   ( )    (x )   Supraspinatus   ( )    (x )   Gluteal   ( )    (x )   Greater trochanter  ( )    (x )   Knee           Reviewed old and all outside pertinent medical records available.    All lab results personally reviewed and interpreted by me.  Lab Results   Component Value Date    WBC 10.03 03/18/2022    HGB 9.9 (L) 03/18/2022    HCT 33.8 (L) 03/18/2022    MCV 76 (L) 03/18/2022    MCH 22.3 (L) 03/18/2022    MCHC 29.3 (L) 03/18/2022    RDW 16.3 (H) 03/18/2022     03/18/2022    MPV 10.2 03/18/2022    PLTEST Appears normal 05/07/2015       Lab Results   Component Value Date     03/18/2022    K 4.6 03/18/2022    CL 97 03/18/2022    CO2 28 03/18/2022     (H) 03/18/2022    BUN 14 03/18/2022    CALCIUM 9.5 03/18/2022    PROT 7.6 03/18/2022    ALBUMIN 3.0 (L) 03/18/2022    BILITOT 0.3 03/18/2022    AST 11 03/18/2022    ALKPHOS 79 03/18/2022    ALT 15 03/18/2022       Lab Results   Component Value Date    COLORU Yellow 03/18/2022    APPEARANCEUA Clear 03/18/2022    SPECGRAV 1.015 03/18/2022    PHUR 5.0 03/18/2022    PROTEINUA Negative 03/18/2022    KETONESU Negative 03/18/2022    LEUKOCYTESUR Negative 03/18/2022    NITRITE Negative 03/18/2022    UROBILINOGEN Negative 03/18/2022       Lab Results   Component Value Date    .5 (H) 03/18/2022       Lab Results   Component Value Date    SEDRATE 80 (H) 03/18/2022       Lab Results   Component Value Date    RF 13.0 03/18/2022    SEDRATE 80 (H) 03/18/2022       No components found for: 25OHVITDTOT, 42RFUVRH1, 89PLXEUJ6, METHODNOTE    Lab Results   Component Value Date    URICACID 4.1 03/18/2022       No components found for: TSPOTTB        Imaging:  All imaging reviewed and independently interpreted by me.         ASSESSMENT / PLAN:     Mariangel Moreau is a 76 y.o. Black or  female with:      1. PMR (polymyalgia rheumatica)  - her inflammatory markers  are elevated, she does report shoulder and hip pain/stiffness, R sub acromial bursitis noted as well, possible PMR (occasional HA-not temporal area, no vision loss or jaw claudication)   - discussed possible diagnosis and trial of steroids  - start PDN 15mg daily, SE discussed  - reassurance   - predniSONE (DELTASONE) 5 MG tablet; Take 3 tablets (15 mg total) by mouth once daily for 14 days, THEN 2 tablets (10 mg total) once daily.  Dispense: 98 tablet; Refill: 0  - LORazepam (ATIVAN) 0.5 MG tablet; Take 2 tablets (1 mg total) by mouth every evening.  Dispense: 60 tablet; Refill: 0    2. Primary osteoarthritis involving multiple joints  - I believe most of her pain is due to OA  - discussed diagnosis and management  - Tylenol/NSAIDs PRN  - wt loss  - reassurance and exercise    3. Steroid Use  -I discussed the potential adverse effects of long term PDN use (I.e. osteoporosis, increase risk of infection, skin fragility, weight gain, blood sugar elevation and avascular necrosis). I encourage patient to take Calcium (1200mg daily) and Vit D (800 units daily) while on PDN.     4. Palmar fasciitis  - discussed diagnosis and management  - CSI today, See procedure note  - Ice area  - reassurance   - Tendon Sheath    5. Trigger index finger of left hand  - discussed diagnosis and management  - CSI today, See procedure note  - Ice area  - reassurance   - Tendon Sheath    6. Subacromial bursitis of right shoulder joint  - discussed diagnosis and management  - CSI today, See procedure note  - Ice area  - reassurance   - Large Joint Aspiration/Injection: R subacromial bursa    7. History of sarcoidosis  - no evidence of active disease  - reassurance     8. Other specified counseling  - over 10 minutes spent regarding below topics:  - Immunization counseling done.  - Weight loss counseling done.  - Nutrition and exercise counseling.  - Limitation of alcohol consumption.  - Regular exercise:  Aerobic and resistance.  - Medication  counseling provided.      No follow-ups on file.Has follow up 5/4/22    Method of contact with patient concerns: [facility]    Disclaimer:  This note is prepared using voice recognition software and as such is likely to have errors and has not been proof read. Please contact me for questions.     Time spent: 45 minutes in face to face discussion concerning diagnosis, prognosis, review of lab and test results, benefits of treatment as well as management of disease, counseling of patient and coordination of care between various health care providers.  Greater than half the time spent was used for coordination of care and counseling of patient.    Pieter Santillan M.D.  Rheumatology Department   [facility]

## 2022-03-30 NOTE — PROCEDURES
Large Joint Aspiration/Injection: R subacromial bursa    Date/Time: 3/30/2022 8:00 AM  Performed by: Pieter Santillan MD  Authorized by: Pieter Santillan MD     Consent Done?:  Yes (Verbal)  Indications:  Arthritis and pain  Site marked: the procedure site was marked    Timeout: prior to procedure the correct patient, procedure, and site was verified    Prep: patient was prepped and draped in usual sterile fashion    Local anesthesia used?: No      Details:  Needle Size:  25 G  Ultrasonic Guidance for needle placement?: No    Approach:  Lateral  Location:  Shoulder  Site:  R subacromial bursa  Medications:  2 mL LIDOcaine HCL 10 mg/ml (1%) 10 mg/mL (1 %); 40 mg triamcinolone acetonide 40 mg/mL  Patient tolerance:  Patient tolerated the procedure well with no immediate complications  Tendon Sheath    Date/Time: 3/30/2022 8:00 AM  Performed by: Pieter Santillan MD  Authorized by: Pieter Santillan MD     Consent Done?:  Yes (Verbal)  Indications:  Pain  Site marked: the procedure site was marked    Timeout: prior to procedure the correct patient, procedure, and site was verified    Prep: patient was prepped and draped in usual sterile fashion      Local anesthesia used?: No    Location:  Index finger  Site:  L index flexor tendon sheath  Ultrasonic guidance for needle placement?: No    Needle size:  25 G  Approach:  Volar  Medications:  1 mL LIDOcaine HCL 10 mg/ml (1%) 10 mg/mL (1 %); 40 mg triamcinolone acetonide 40 mg/mL  Patient tolerance:  Patient tolerated the procedure well with no immediate complications  Tendon Sheath    Date/Time: 3/30/2022 8:00 AM  Performed by: Pieter Santillan MD  Authorized by: Pieter Santillan MD     Consent Done?:  Yes (Verbal)  Indications:  Pain  Site marked: the procedure site was marked    Timeout: prior to procedure the correct patient, procedure, and site was verified    Prep: patient was prepped and draped in usual sterile fashion      Local anesthesia  used?: No    Location: right palmar fascia   Ultrasonic guidance for needle placement?: No    Needle size:  25 G  Approach:  Volar  Medications:  1 mL LIDOcaine HCL 10 mg/ml (1%) 10 mg/mL (1 %); 40 mg triamcinolone acetonide 40 mg/mL  Patient tolerance:  Patient tolerated the procedure well with no immediate complications

## 2022-04-27 NOTE — PLAN OF CARE
Pt verbalized understanding of discharge instructions, all questions answered.   
S/p RUE mass excision. Rachana 10/10. Patient is AAO x 4 and her vitals are stable at this time. B/P returned to re-op baseline. RUE dressing (dermabond and prineo tape) are clean, dry and intact. Pain is controlled and she denies nausea. Recovery care is complete. Patient was transferred to [facility] with family at the bedside.   
11-20

## 2022-05-05 ENCOUNTER — OFFICE VISIT (OUTPATIENT)
Dept: RHEUMATOLOGY | Facility: CLINIC | Age: 77
End: 2022-05-05
Payer: MEDICARE

## 2022-05-05 VITALS
DIASTOLIC BLOOD PRESSURE: 66 MMHG | RESPIRATION RATE: 20 BRPM | SYSTOLIC BLOOD PRESSURE: 157 MMHG | HEART RATE: 96 BPM | OXYGEN SATURATION: 97 %

## 2022-05-05 DIAGNOSIS — Z86.2 HISTORY OF SARCOIDOSIS: ICD-10-CM

## 2022-05-05 DIAGNOSIS — M35.3 PMR (POLYMYALGIA RHEUMATICA): Primary | ICD-10-CM

## 2022-05-05 DIAGNOSIS — Z71.89 COUNSELING AND COORDINATION OF CARE: ICD-10-CM

## 2022-05-05 DIAGNOSIS — M15.9 PRIMARY OSTEOARTHRITIS INVOLVING MULTIPLE JOINTS: ICD-10-CM

## 2022-05-05 DIAGNOSIS — Z79.52 LONG TERM SYSTEMIC STEROID USER: ICD-10-CM

## 2022-05-05 PROCEDURE — 3288F PR FALLS RISK ASSESSMENT DOCUMENTED: ICD-10-PCS | Mod: CPTII,S$GLB,, | Performed by: INTERNAL MEDICINE

## 2022-05-05 PROCEDURE — 3077F PR MOST RECENT SYSTOLIC BLOOD PRESSURE >= 140 MM HG: ICD-10-PCS | Mod: CPTII,S$GLB,, | Performed by: INTERNAL MEDICINE

## 2022-05-05 PROCEDURE — 1101F PR PT FALLS ASSESS DOC 0-1 FALLS W/OUT INJ PAST YR: ICD-10-PCS | Mod: CPTII,S$GLB,, | Performed by: INTERNAL MEDICINE

## 2022-05-05 PROCEDURE — 99215 OFFICE O/P EST HI 40 MIN: CPT | Mod: GC,S$GLB,, | Performed by: INTERNAL MEDICINE

## 2022-05-05 PROCEDURE — 1101F PT FALLS ASSESS-DOCD LE1/YR: CPT | Mod: CPTII,S$GLB,, | Performed by: INTERNAL MEDICINE

## 2022-05-05 PROCEDURE — 99999 PR PBB SHADOW E&M-EST. PATIENT-LVL III: ICD-10-PCS | Mod: PBBFAC,,, | Performed by: INTERNAL MEDICINE

## 2022-05-05 PROCEDURE — 1159F PR MEDICATION LIST DOCUMENTED IN MEDICAL RECORD: ICD-10-PCS | Mod: CPTII,S$GLB,, | Performed by: INTERNAL MEDICINE

## 2022-05-05 PROCEDURE — 4010F ACE/ARB THERAPY RXD/TAKEN: CPT | Mod: CPTII,S$GLB,, | Performed by: INTERNAL MEDICINE

## 2022-05-05 PROCEDURE — 1125F AMNT PAIN NOTED PAIN PRSNT: CPT | Mod: CPTII,S$GLB,, | Performed by: INTERNAL MEDICINE

## 2022-05-05 PROCEDURE — 99215 PR OFFICE/OUTPT VISIT, EST, LEVL V, 40-54 MIN: ICD-10-PCS | Mod: GC,S$GLB,, | Performed by: INTERNAL MEDICINE

## 2022-05-05 PROCEDURE — 3077F SYST BP >= 140 MM HG: CPT | Mod: CPTII,S$GLB,, | Performed by: INTERNAL MEDICINE

## 2022-05-05 PROCEDURE — 3078F DIAST BP <80 MM HG: CPT | Mod: CPTII,S$GLB,, | Performed by: INTERNAL MEDICINE

## 2022-05-05 PROCEDURE — 1125F PR PAIN SEVERITY QUANTIFIED, PAIN PRESENT: ICD-10-PCS | Mod: CPTII,S$GLB,, | Performed by: INTERNAL MEDICINE

## 2022-05-05 PROCEDURE — 4010F PR ACE/ARB THEARPY RXD/TAKEN: ICD-10-PCS | Mod: CPTII,S$GLB,, | Performed by: INTERNAL MEDICINE

## 2022-05-05 PROCEDURE — 3288F FALL RISK ASSESSMENT DOCD: CPT | Mod: CPTII,S$GLB,, | Performed by: INTERNAL MEDICINE

## 2022-05-05 PROCEDURE — 99999 PR PBB SHADOW E&M-EST. PATIENT-LVL III: CPT | Mod: PBBFAC,,, | Performed by: INTERNAL MEDICINE

## 2022-05-05 PROCEDURE — 3078F PR MOST RECENT DIASTOLIC BLOOD PRESSURE < 80 MM HG: ICD-10-PCS | Mod: CPTII,S$GLB,, | Performed by: INTERNAL MEDICINE

## 2022-05-05 PROCEDURE — 1159F MED LIST DOCD IN RCRD: CPT | Mod: CPTII,S$GLB,, | Performed by: INTERNAL MEDICINE

## 2022-05-05 RX ORDER — PREDNISONE 5 MG/1
10 TABLET ORAL DAILY
Qty: 60 TABLET | Refills: 6 | Status: SHIPPED | OUTPATIENT
Start: 2022-05-05

## 2022-05-05 NOTE — PROGRESS NOTES
Patient seen with medical student, please see note.     -PMR  Improved with PDN  Continue PDN 10mg  Update DEXA, Tirso + D  Monitor F/S, DM control per PCP    -OA  Tylenol PRN   Reassurance and exercise

## 2022-05-05 NOTE — PROGRESS NOTES
Subjective:       Patient ID: Mariangel Moreau is a 76 y.o. female.    Chief Complaint: No chief complaint on file.    HPI   HPI  Mariangel Moreau is a 76 y.o. Black or  female with medical history noted below who presents for evaluation of joint pain.   Patient presents for evaluation of joint pain noting she has joint pain for many years.  She notes her worst areas are her hands, knees, and shoulders.  She reports about 30 minutes of morning stiffness.  Denies any swelling.  Notes the pain is typically worse with activity and improves with rest.  Reports paresthesias coming down from her shoulders to her hands.  Patient endorses fatigue and headaches.  Denies weight loss, night sweats, fevers, cough, rash or shortness of breath.  Of note patient reports history of sarcoid.    03/30  Patient here for follow up sooner than expected.   Last visit work up obtained to determine etiology of pain. Work up showed OA on imaging, elevated ESR/CRP. She was given Elavil to help with sleep and NSAIDs continued. She notes Elavil not helping with sleep, and pain is unbearable, though she is not taking the NSAIDs. She notes pain with stiffness in her shoulders and hips, also notes knee pain, and hand pain. Notes the hand pain limits her activities and is very bothersome. +fatigue, myalgias, HA, paraesthesias, poor sleep, anxiety still present.    05/05  Pt compliant w/ prednisone prescribed at last visit and now down to 10 mg daily. Pt reports that her bilateral shoulder pain/stiffness is much improved since the last visit. Pt reports taking 2 tylenol arthritis extended release 2 in the AM and 2 before bed time. Her R shoulder which was injected last time is doing OK today, as well as her trigger finger and palmar fascitis. Overall, doing better than the last visit.     Review of Systems   Constitutional: Negative.    HENT: Negative.    Eyes: Negative.    Respiratory: Negative.    Cardiovascular: Negative.     Gastrointestinal: Negative.    Endocrine: Negative.    Genitourinary: Negative.    Musculoskeletal: Positive for arthralgias and gait problem.   Skin: Negative.    Allergic/Immunologic: Negative.            Objective:   BP (!) 157/66 (BP Location: Left arm, Patient Position: Sitting, BP Method: Medium (Automatic))   Pulse 96   Resp 20   SpO2 97%      Physical Exam   HENT:   Head: Normocephalic.   Right Ear: Tympanic membrane, external ear and ear canal normal.   Left Ear: Tympanic membrane, external ear and ear canal normal.   Nose: Nose normal.   Mouth/Throat: Mucous membranes are moist. Oropharynx is clear.   Cardiovascular: Normal pulses.   Pulmonary/Chest: Effort normal and breath sounds normal.   Abdominal: Normal appearance.   Musculoskeletal:         General: Normal range of motion.      Cervical back: Normal range of motion.   Neurological: She is alert.        No data to display     Assessment:       1. PMR (polymyalgia rheumatica)    2. Long term systemic steroid user         Pt is a 77 y/o female being seen by rheumatology for osteoarthritis involving multiple joints and polymyalgia rheumatica. Pt appears to have responded well to steroids from last visit, and reports doing better today w/ her hip and shoulder stiffness. Pt also controlling her joint pain well w/ prn tylenol in the AM and PM.     Plan:       Problem List Items Addressed This Visit    None     Visit Diagnoses     PMR (polymyalgia rheumatica)    -  Primary    Relevant Medications    predniSONE (DELTASONE) 5 MG tablet    Other Relevant Orders    DXA Bone Density Spine And Hip    Long term systemic steroid user         Relevant Orders    DXA Bone Density Spine And Hip        Polymylagia Rheumatica  - Continue w/ prednisone 10 mg, start tapering to 7.5 at next visit   - Referral for DEXA scan  - Continue taking tylenol prn in the AM and PM  - Continue w/ VitD/Ca2+ supplementation   - f/u in 8 weeks     Kip Echeverria, MS4

## 2022-08-03 ENCOUNTER — OFFICE VISIT (OUTPATIENT)
Dept: PODIATRY | Facility: CLINIC | Age: 77
End: 2022-08-03
Payer: MEDICARE

## 2022-08-03 VITALS — WEIGHT: 192.25 LBS | HEIGHT: 69 IN | BODY MASS INDEX: 28.47 KG/M2

## 2022-08-03 DIAGNOSIS — E11.8 TYPE 2 DIABETES MELLITUS WITH COMPLICATION, WITHOUT LONG-TERM CURRENT USE OF INSULIN: Primary | ICD-10-CM

## 2022-08-03 DIAGNOSIS — B35.1 ONYCHOMYCOSIS DUE TO DERMATOPHYTE: ICD-10-CM

## 2022-08-03 DIAGNOSIS — M20.40 HAMMER TOE, UNSPECIFIED LATERALITY: ICD-10-CM

## 2022-08-03 PROCEDURE — 99203 OFFICE O/P NEW LOW 30 MIN: CPT | Mod: S$GLB,,, | Performed by: PODIATRIST

## 2022-08-03 PROCEDURE — 1126F PR PAIN SEVERITY QUANTIFIED, NO PAIN PRESENT: ICD-10-PCS | Mod: CPTII,S$GLB,, | Performed by: PODIATRIST

## 2022-08-03 PROCEDURE — 99999 PR PBB SHADOW E&M-EST. PATIENT-LVL III: ICD-10-PCS | Mod: PBBFAC,,, | Performed by: PODIATRIST

## 2022-08-03 PROCEDURE — 99203 PR OFFICE/OUTPT VISIT, NEW, LEVL III, 30-44 MIN: ICD-10-PCS | Mod: S$GLB,,, | Performed by: PODIATRIST

## 2022-08-03 PROCEDURE — 1159F PR MEDICATION LIST DOCUMENTED IN MEDICAL RECORD: ICD-10-PCS | Mod: CPTII,S$GLB,, | Performed by: PODIATRIST

## 2022-08-03 PROCEDURE — 99999 PR PBB SHADOW E&M-EST. PATIENT-LVL III: CPT | Mod: PBBFAC,,, | Performed by: PODIATRIST

## 2022-08-03 PROCEDURE — 3288F FALL RISK ASSESSMENT DOCD: CPT | Mod: CPTII,S$GLB,, | Performed by: PODIATRIST

## 2022-08-03 PROCEDURE — 1101F PR PT FALLS ASSESS DOC 0-1 FALLS W/OUT INJ PAST YR: ICD-10-PCS | Mod: CPTII,S$GLB,, | Performed by: PODIATRIST

## 2022-08-03 PROCEDURE — 1126F AMNT PAIN NOTED NONE PRSNT: CPT | Mod: CPTII,S$GLB,, | Performed by: PODIATRIST

## 2022-08-03 PROCEDURE — 1101F PT FALLS ASSESS-DOCD LE1/YR: CPT | Mod: CPTII,S$GLB,, | Performed by: PODIATRIST

## 2022-08-03 PROCEDURE — 4010F PR ACE/ARB THEARPY RXD/TAKEN: ICD-10-PCS | Mod: CPTII,S$GLB,, | Performed by: PODIATRIST

## 2022-08-03 PROCEDURE — 1159F MED LIST DOCD IN RCRD: CPT | Mod: CPTII,S$GLB,, | Performed by: PODIATRIST

## 2022-08-03 PROCEDURE — 3288F PR FALLS RISK ASSESSMENT DOCUMENTED: ICD-10-PCS | Mod: CPTII,S$GLB,, | Performed by: PODIATRIST

## 2022-08-03 PROCEDURE — 4010F ACE/ARB THERAPY RXD/TAKEN: CPT | Mod: CPTII,S$GLB,, | Performed by: PODIATRIST

## 2022-08-03 RX ORDER — CICLOPIROX 80 MG/ML
SOLUTION TOPICAL NIGHTLY
Qty: 6.6 ML | Refills: 3 | Status: SHIPPED | OUTPATIENT
Start: 2022-08-03

## 2022-08-03 RX ORDER — AMMONIUM LACTATE 12 G/100G
1 CREAM TOPICAL DAILY
Qty: 140 G | Refills: 5 | Status: SHIPPED | OUTPATIENT
Start: 2022-08-03

## 2022-08-03 NOTE — PATIENT INSTRUCTIONS
Kerasal for fungal nails apply daily to all toenails.  Can be found at [facility]

## 2022-08-07 NOTE — PROGRESS NOTES
Subjective:      Patient ID: Mariangel Moreau is a 77 y.o. female.    Chief Complaint: Diabetes Mellitus and Callouses    Mariangel is a 77 y.o. female who presents to the clinic upon referral from Dr. Lopez  for evaluation and treatment of diabetic feet. Mariangel has a past medical history of Aortic atherosclerosis, Diabetes mellitus, High cholesterol, Hypertension, and Sarcoidosis. Presents for diabetic foot risk assessment.     PCP: Jorge Sahni NP    Date Last Seen by PCP: none found    Current shoe gear: Tennis shoes    No results found for: HGBA1C          Review of Systems   Constitutional: Negative for chills.   Cardiovascular: Negative for chest pain and claudication.   Respiratory: Negative for cough.    Skin: Positive for color change, dry skin and nail changes.   Musculoskeletal: Positive for joint pain.   Gastrointestinal: Negative for nausea.   Neurological: Positive for paresthesias. Negative for numbness.   Psychiatric/Behavioral: The patient is not nervous/anxious.            Objective:      Physical Exam  Constitutional:       Appearance: She is well-developed.      Comments: Oriented to time, place, and person.   Cardiovascular:      Comments: DP and PT pulses are palpable bilaterally. 3 sec capillary refill time and toes and feet are warm to touch proximally .  There is  hair growth on the feet and toes b/l. There is no edema b/l. No spider veins or varicosities present b/l.     Musculoskeletal:      Comments: Equinus noted b/l ankles with < 10 deg DF noted. MMT 5/5 in DF/PF/Inv/Ev resistance with no reproduction of pain in any direction. Passive range of motion of ankle and pedal joints is painless b/l.    Decreased stride, station of gait.  apropulsive toe off.  Increased angle and base of gait.      Patient has hammertoes of digits 2-5 bilateral partially reducible without symptom today.     Visible and palpable bunion without pain at dorsomedial 1st metatarsal head right and left.  Hallux abducted  right and left partially reducible, tracks laterally without being track bound.  No ecchymosis, erythema, edema, or cardinal signs infection or signs of trauma same foot.       Feet:      Right foot:      Skin integrity: No callus or dry skin.      Left foot:      Skin integrity: No callus or dry skin.   Lymphadenopathy:      Comments: Negative lymphadenopathy bilateral popliteal fossa and tarsal tunnel.   Skin:     Comments: No open lesions, lacerations or wounds noted.Interdigital spaces clean, dry and intact b/l. No erythema noted to b/l foot.  Toenails 1-5 bilaterally are elongated by 2-3 mm, thickened by 2-3 mm, discolored/yellowed, dystrophic, brittle with subungual debris.  Xerosis B/L    Neurological:      Mental Status: She is alert.      Comments: Light touch, proprioception, and sharp/dull sensation are all intact bilaterally. Protective threshold with the [place] monofilament is intact bilaterally.    Psychiatric:         Behavior: Behavior is cooperative.               Assessment:       Encounter Diagnoses   Name Primary?    Type 2 diabetes mellitus with complication, without long-term current use of insulin Yes    Hammer toe, unspecified laterality     Onychomycosis due to dermatophyte          Plan:       Mariangel was seen today for diabetes mellitus and callouses.    Diagnoses and all orders for this visit:    Type 2 diabetes mellitus with complication, without long-term current use of insulin  -     DIABETIC SHOES FOR HOME USE    Hammer toe, unspecified laterality  -     DIABETIC SHOES FOR HOME USE    Onychomycosis due to dermatophyte    Other orders  -     ammonium lactate 12 % Crea; Apply 1 application topically once daily.  -     ciclopirox (PENLAC) 8 % Soln; Apply topically nightly.      I counseled the patient on her conditions, their implications and medical management.      - Shoe inspection. Diabetic Foot Education. Patient reminded of the importance of good nutrition and blood  sugar control to help prevent podiatric complications of diabetes. Patient instructed on proper foot hygeine. We discussed wearing proper shoe gear, daily foot inspections, never walking without protective shoe gear, caution putting sharp instruments to feet     - Discussed DM foot care:  Wear comfortable, proper fitting shoes. Wash feet daily. Dry well. After drying, apply moisturizer to feet (no lotion to webspaces). Inspect feet daily for skin breaks, blisters, swelling, or redness. Wear cotton socks (preferably white)  Change socks every day. Do NOT walk barefoot. Do NOT use heating pads or warm/hot water soaks     Rx. Amlactin    At patient's request, I discussed different treatments for toenail fungus. We discussed oral antifungals but I did not recommend them as a first line treatment since the medication is taken internally and can have side effects such as rash, taste disturbances, and liver enzyme elevation. We discussed topical Penlac to be applied daily and removed weekly. Pt. Expresses understanding and would like to try the Penlac. Rx sent to the pharmacy.     Rx diabetic shoes with custom molded inserts to be worn at all times while ambulating. Prescription provided with list of local retailers.

## 2023-10-16 NOTE — DISCHARGE INSTRUCTIONS
Stable   Take medications as directed. Follow-up with your primary care doctor. Return for any new or worsening complaints.

## 2023-12-27 DIAGNOSIS — M65.30 TRIGGER FINGER: Primary | ICD-10-CM

## 2024-01-02 NOTE — PROGRESS NOTES
[facility] Occupational Therapy  Initial Evaluation     Date:  1/3/2024    Name: Mariangel Moreau  Clinic Number: [id number]    Therapy Diagnosis:   1. Decreased  strength of left hand        2. Trigger finger  Ambulatory referral/consult to Physical/Occupational Therapy      3. Decreased range of motion of finger of left hand            Physician: Almaz Tristan FNP     Physician Orders: Eval and Treat  Medical Diagnosis: trigger finger  Surgical Procedure and Date: n/a  Evaluation Date: 01/03/2024   Insurance Authorization Period Expiration: tbd  Plan of Care Certification Period: 1/3/24-2/28/24  Date of Return to MD: not appointed    Visit # / Visits authorized: 1 / tbd    Time In: 9:25 am  Time Out: 10:15 am  Total Billable Time: 50 minutes    Precautions:  Diabetes    Subjective     Involved Side: left hand  Dominant Side: Right  Date of Onset: several  years  Mechanism of Injury: insidious onset of triggering  History of Current Condition: catches mostly in the night and the evening  Surgical Procedure: none  Imaging: xray   FINDINGS:  AP hands: Bones are well mineralized.  Alignment is satisfactory and joint spaces are fairly well maintained.  Mild degenerative changes at some of the interphalangeal joints.  Mild triangular fibrocartilage complex chondrocalcinosis bilaterally.  No fracture, dislocation, or erosive change.  Electronically signed by: Louis Smallwood MD  Date:                                            03/21/2022  Previous Therapy: none    Past Medical History/Physical Systems Review:   Mariangel Moreau  has a past medical history of Aortic atherosclerosis, Diabetes mellitus, High cholesterol, Hypertension, and Sarcoidosis.    Mariangel Moreau  has a past surgical history that includes Cholecystectomy; Colonoscopy (N/A, 9/13/2019); and Surgical removal of mass of upper extremity (Right, 3/4/2022).    Mariangel has a current medication list which includes the following prescription(s):  ammonium lactate, aspirin, ciclopirox, diclofenac sodium, ergocalciferol, fluconazole, glipizide, ibuprofen, lidocaine, lidocaine, lisinopril, lorazepam, meloxicam, metformin, multivitamin, mupirocin, and prednisone.    Review of patient's allergies indicates:   Allergen Reactions    Darvocet a500 [propoxyphene n-acetaminophen] Swelling    Penicillins Swelling    Percocet [oxycodone-acetaminophen]     Tramadol Nausea And Vomiting        Patient's Goals for Therapy: to not have pain in my hand    Pain:  Functional Pain Scale Rating 0-10:   Not provided /10 on average  0 /10 at best  5/10 at worst  Location: left ring and long finger  Description: Sharp locks  Aggravating Factors: unable to associate with any specific activity  Easing Factors:  nothing    Occupation:  nursing retired during pandemic    Functional Limitations/Social History:    Previous functional status includes: Independent with all ADLs.     Current FunctionalStatus   Home/Living environment : lives with their spouse      Limitation of Functional Status as follows:   ADLs/IADLs:     - Feeding: I    - Bathing: I    - Dressing/Grooming: I    - Driving: some pain with closing the car door     Leisure: bible study, marriage couple, busy with [employer]    Objective    Observation: Skin intact, no abnormalities noted     Sensation: Median Nerve Intact  [place] Monofilament Test: Yes  Results: 3.61  Stereognosis: Intact    Special Tests:   Tinels  negative bilaterally  Wound Assessment: n/a      Edema: Circumferential measurements: as follows:    Ring finger R Ring finger L      Proximal Plalnx 5.4 cm 5.0 cm      PIP Joint 5.7 cm 5.3 cm      Middle Phanlnx 5.1 cm 4.6 cm      DIP Joint 4.7 cm 4.3 cm      Distal Phalnx 4.3 cm 4.1 cm        MP's: right 19.2 cm  left 19.0 cm  Proximal Wrist Crease: right 16.3 cm  Left 15.8 cm      Range of Motion: right and left Active  (Ext/Flex) Right ring Left ring      MP 0/70 0/37      PIP -10/106 0/92      DIP 0/49  0/46      ALANIS 225 175        Thumb Opposition: to all tips and proximal phalanx small finger  Palmar Abduction: WFL  Radial Abduction: wfl    Wrist Ext/Flex: wfl/wfl  Supination/Pronation: wfl/wfl    Manual Muscle Test: deferred    Wrist Left Right   Flexion     Extension     Radial Deviation     Ulnar Deviation     Supination     Pronation          Strength: (COLE Dynamometer in lbs.) Average 3 trials, Position II  Right: 25#  Left: 8.6#      Intake Outcome Measure  for FOTO hand  Survey    Therapist reviewed FOTO scores for Mariangel Moreau on 1/3/2024.   FOTO documents entered into [employer] - see Media section.    Intake Score: 41%         Treatment     Treatment Time In: 10:05 am  Treatment Time Out: 10:15 am  Total Treatment time separate from Evaluation time:10    Mariangel received therapeutic exercises for 10 minutes including:  -education and performance of Isolated PIP flexion and joint blocking of PIP joint of ring finger    Home Exercise Program/Education:  Issued HEP (see patient instructions in EMR) and educated on modality use for pain management . Exercises were reviewed and Mariangel was able to demonstrate them prior to the end of the session.   Pt received a written copy of exercises to perform at home. Mariangel demonstrated fair  understanding of the education provided.  Pt was advised to perform these exercises free of pain, and to stop performing them if pain occurs.    Patient/Family Education: role of OT, goals for OT, scheduling/cancellations - pt verbalized understanding. Discussed insurance limitations with patient.    Additional Education provided: avoidance of full tight  of left hand, educated regarding pathology of trigger finger,  importance of avoiding triggering finger, use of ice for pain    Assessment     Mariangel Moreau is a 78 y.o. female referred to outpatient occupational therapy and presents with a medical diagnosis of trigger finger, resulting in Decreased ROM, Decreased   strength, and Increased pain and demonstrates limitations as described in the chart below. Following medical record review it is determined that pt will benefit from occupational therapy services in order to maximize pain free and/or functional use of left ring finger. The following goals were discussed with the patient and patient is in agreement with them as to be addressed in the treatment plan. The patient's rehab potential is Good.     Anticipated barriers to occupational therapy: none noted at this time  Pt has no cultural, educational or language barriers to learning provided.    Medical Necessity is demonstrated by the following  Occupational Profile/History  Co-morbidities and personal factors that may impact the plan of care [x] LOW: Brief chart review  [] MODERATE: Expanded chart review   [] HIGH: Extensive chart review    Moderate / High Support Documentation:      Examination  Performance deficits relating to physical, cognitive or psychosocial skills that result in activity limitations and/or participation restrictions  [x] LOW: addressing 1-3 Performance deficits  [] MODERATE: 3-5 Performance deficits  [] HIGH: 5+ Performance deficits (please support below)    Moderate / High Support Documentation:    Physical:  Joint Mobility  Muscle Power/Strength   Strength  Pain    Cognitive:  Memory    Psychosocial:    No Deficits     Treatment Options [x] LOW: Limited options  [] MODERATE: Several options  [] HIGH: Multiple options      Decision Making/ Complexity Score: low       The following goals were discussed with the patient and patient is in agreement with them as to be addressed in the treatment plan.     Goals:   Short term goals to be met in 4 weeks(1/31/24)   Patient to be IND with HEP and modalities for pain/edema managment.  Increase ROM 5 degrees to increase functional hand use for ADLs/work/leisure activities.  Increase  strength 4 lbs. to improve functional grasp for ADLs/work/leisure  activities.   Decrease complaints of pain to  3 out of 10 at worst to increase functional hand use for ADL/work/leisure activities.     Long term goals to be met by discharge:  Pt will achieve left ring finger tip to palm to improve functional use of left hand  Pt will report [place] of 1 out 10 with left hand use   Pt will increase left  to 15# to improve functional use of left hand in household tasks    Plan   Certification Period/Plan of care expiration: 1/3/2024 to 2/28/24.    Outpatient Occupational Therapy 2 times weekly for 8 weeks to include the following interventions: Paraffin, Fluidotherapy, Manual therapy/joint mobilizations, Modalities for pain management, US 3 mhz, and Therapeutic exercises/activities..      ERON Day LOTR    I certify the need for these services furnished under this plan of treatment and while under my care    ____________________________________  Physician/Referring Practitioner    _______________  Date of Signature

## 2024-01-03 ENCOUNTER — CLINICAL SUPPORT (OUTPATIENT)
Dept: REHABILITATION | Facility: HOSPITAL | Age: 79
End: 2024-01-03
Payer: MEDICARE

## 2024-01-03 DIAGNOSIS — R29.898 DECREASED GRIP STRENGTH OF LEFT HAND: Primary | ICD-10-CM

## 2024-01-03 DIAGNOSIS — M65.30 TRIGGER FINGER: ICD-10-CM

## 2024-01-03 DIAGNOSIS — M25.642 DECREASED RANGE OF MOTION OF FINGER OF LEFT HAND: ICD-10-CM

## 2024-01-03 PROCEDURE — 97110 THERAPEUTIC EXERCISES: CPT | Mod: PN

## 2024-01-03 PROCEDURE — 97165 OT EVAL LOW COMPLEX 30 MIN: CPT | Mod: PN

## 2024-01-03 NOTE — PATIENT INSTRUCTIONS
[facility] - OCCUPATIONAL THERAPY  HOME EXERCISE PROGRAM       Complete the following exercises with 10 repetitions each, 2 x/day.       AROM: PIP Flexion / Extension  Pinch bottom knuckle  to prevent bending. Actively bend middle knuckle until stretch is felt.   Hold 3 seconds. Relax. Straighten finger as far as possible.    AROM: Isolated PIP Flexion  Bend only middle joint of your finger, keeping other fingers straight with other hand.    as close as possible.    Copyright © I. All rights reserved.     Therapist: ERON Day

## 2024-01-04 ENCOUNTER — OFFICE VISIT (OUTPATIENT)
Dept: UROLOGY | Facility: CLINIC | Age: 79
End: 2024-01-04
Payer: MEDICARE

## 2024-01-04 DIAGNOSIS — N39.41 URINARY INCONTINENCE, URGE: Primary | ICD-10-CM

## 2024-01-04 PROCEDURE — 99203 OFFICE O/P NEW LOW 30 MIN: CPT | Mod: S$GLB,,, | Performed by: STUDENT IN AN ORGANIZED HEALTH CARE EDUCATION/TRAINING PROGRAM

## 2024-01-04 PROCEDURE — 99999 PR PBB SHADOW E&M-EST. PATIENT-LVL III: CPT | Mod: PBBFAC,,, | Performed by: STUDENT IN AN ORGANIZED HEALTH CARE EDUCATION/TRAINING PROGRAM

## 2024-01-04 NOTE — PROGRESS NOTES
" Patient ID: Mariangel Moreau is a 78 y.o. female.    Chief Complaint: Urinary urge incontinence x several months    Referral: Almaz Tristan, FNP  [address] [facility] [place] [address]     [facility]  78 y.o. who presents to the Urology clinic for evaluation of urinary urge incontinence for past several months. No prior management, that patient can recall. Patient wears depends. Notes that she is not able to make it to the restroom without urine leaking down her legs. No prior  procedures. 2 prior vaginal deliveries. Denies leakage of urine with coughing, sneezing, laughing. She drinks very little fluids. No hx of UTIs. Denies odor to urine, blood.     Medically Necessary ROS documented in HPI    Past Medical History  Active Ambulatory Problems     Diagnosis Date Noted    Foot pain, bilateral 04/17/2015    Fall     Chest pain     Type 2 diabetes mellitus with complication, without long-term current use of insulin 03/28/2017    Pure hypercholesterolemia 03/28/2017    Sarcoidosis 03/28/2017    Neck pain     Screening for colon cancer 09/13/2019    Arm mass, right 03/04/2022    Decreased  strength of left hand 01/03/2024    Decreased range of motion of finger of left hand 01/03/2024     Resolved Ambulatory Problems     Diagnosis Date Noted    No Resolved Ambulatory Problems     Past Medical History:   Diagnosis Date    Aortic atherosclerosis     Diabetes mellitus     High cholesterol     Hypertension          Past Surgical History  Past Surgical History:   Procedure Laterality Date    CHOLECYSTECTOMY      COLONOSCOPY N/A 9/13/2019    Procedure: COLONOSCOPY;  Surgeon: Miguelito Garcia MD;  Location: [address]);  Service: Endoscopy;  Laterality: N/A;  Referral from SABRINA Gonzalez, [facility]  Ht: 5'9"  Wt: 180 lbs BMI: 26.2    SURGICAL REMOVAL OF MASS OF UPPER EXTREMITY Right 3/4/2022    Procedure: EXCISION, MASS, UPPER EXTREMITY;  Surgeon: Miller Saravia MD;  Location: [facility];  Service: General;  Laterality: Right;  " CALLED AND SPOKE TO DAUGHTER ON 2/25/2022. DAUGHTER WILL CALL BACK IN 1 HOUR  WITH MOM AND BOOK PRE OP APPOINTMENT. 12:32PM-LO  RN PREOP ON 3/3/22; COVID NEGATIVE       Social History  Social Connections: Not on file       Medications    Current Outpatient Medications:     ammonium lactate 12 % Crea, Apply 1 application topically once daily., Disp: 140 g, Rfl: 5    aspirin (ECOTRIN) 81 MG EC tablet, Take 1 tablet (81 mg total) by mouth once daily., Disp: 90 tablet, Rfl: 3    ciclopirox (PENLAC) 8 % Soln, Apply topically nightly., Disp: 6.6 mL, Rfl: 3    diclofenac sodium (VOLTAREN) 1 % Gel, Apply 2 g topically 4 (four) times daily as needed., Disp: 100 g, Rfl: 0    ergocalciferol (ERGOCALCIFEROL) 50,000 unit Cap, Take 50,000 Units by mouth every 7 days., Disp: , Rfl:     fluconazole (DIFLUCAN) 150 MG Tab, Take 150 mg by mouth once as needed., Disp: , Rfl:     glipiZIDE (GLUCOTROL) 10 MG TR24, , Disp: , Rfl:     ibuprofen (ADVIL,MOTRIN) 600 MG tablet, Take 1 tablet (600 mg total) by mouth 3 (three) times daily., Disp: 30 tablet, Rfl: 1    LIDOcaine (LIDODERM) 5 %, Place 1 patch onto the skin once daily. Remove & Discard patch within 12 hours or as directed by MD, Disp: 15 patch, Rfl: 5    LIDOcaine (SALONPAS, LIDOCAINE,) 4 % PtMd, Apply 1 patch topically once daily., Disp: 10 patch, Rfl: 5    lisinopril (PRINIVIL,ZESTRIL) 40 MG tablet, Take 40 mg by mouth once daily., Disp: , Rfl:     LORazepam (ATIVAN) 0.5 MG tablet, Take 2 tablets (1 mg total) by mouth every evening., Disp: 60 tablet, Rfl: 0    meloxicam (MOBIC) 7.5 MG tablet, Take 7.5 mg by mouth once daily., Disp: , Rfl:     metFORMIN (GLUCOPHAGE) 500 MG tablet, Take 500 mg by mouth 2 (two) times daily., Disp: , Rfl:     multivitamin capsule, Take 1 capsule by mouth once daily., Disp: , Rfl:     mupirocin (BACTROBAN) 2 % ointment, Use qd on wound, Disp: 30 g, Rfl: 2    predniSONE (DELTASONE) 5 MG tablet, Take 2 tablets (10 mg total) by mouth once daily., Disp: 60  tablet, Rfl: 6    Allergies  Review of patient's allergies indicates:   Allergen Reactions    Darvocet a500 [propoxyphene n-acetaminophen] Swelling    Penicillins Swelling    Percocet [oxycodone-acetaminophen]     Tramadol Nausea And Vomiting       Patient's PMH, FH, Social hx, Medications, allergies reviewed and updated as pertinent to today's visit    Objective:      Physical Exam  Constitutional:       Appearance: She is well-developed.   HENT:      Head: Normocephalic and atraumatic.   Eyes:      Conjunctiva/sclera: Conjunctivae normal.   Pulmonary:      Effort: Pulmonary effort is normal. No respiratory distress.   Abdominal:      General: Abdomen is flat. There is no distension.      Palpations: Abdomen is soft.   Skin:     General: Skin is warm.      Findings: No rash.   Neurological:      Mental Status: She is alert and oriented to person, place, and time.   Psychiatric:         Behavior: Behavior normal.             Assessment:       1. Urinary incontinence, urge        Plan:       Patient would like to try lifestyle changes with OAB exercises, SUFU handout provided  Follow up in 3 months  Patient unable to void for specimen  PVR 30 cc, no retention  Patient declined medication, pamphlet provided in the event patient would like to reconsider    What is known about OAB was discussed with the patient including the benefits versus risks/burdens of the available treatment alternatives and the fact that acceptable symptom control may require trials of multiple therapeutic options before it is achieved. Discussed with patient OAB is a symptom complex that is not a life-threatening condition, acknowledged it can impair quality of life for many patients. OAB treatment plan reviewed with patient    Discussed and recommended first Line therapy:   -Behavioral modification- pelvic floor retraining exercises ( which can be successful in up to 75% of patients) , limit known bladder irritants such as caffeine, soda,  alcohol, use of a voiding diary to determine frequency of events.   -recommend completion of voiding diary, 3 days to better characterize patient's symptoms and optimize treatment plan efficacy  -Treatment of any underlying constipation with increased fiber, water intake.  - Timed voiding every 4-6 hours, if patient unable to hold urine every 4 hours, engaging in bladder retraining to reach this time frame in 0.5-1 hr interval, with anticipated increase in bladder storage habits.  - Drinking at least 8-10 glasses of water daily in addition to other beverages.  Limit intake of fluids to 6pm.  -*Discussed and recommended pelvic floor muscle therapy to strengthen weak pelvic floor muscles.    Discussed Second-Line Treatments: Pharmacologic Management  Discussed use of oral anti-muscarinics and oral ?3-adrenoceptor agonists. Antimuscarinics are associated with increased risk of dementia with long term usage, contraindicated in patients with history of narrow angle glaucoma, delayed gastric emptying. Discussed mirabegron can be associated with increased BP, monitoring at home is advised. Discussed virbegron may be costly compared to other agents, but does not have the same risk of elevated BP, need for BP monitoring as mirabegron    Discussed if conservative and pharmacotherapy should fail, plan to proceed to third-Line treatments:  - Botox (must be willing to accept self catheterization risk, PVR checks)  - PTNS ( non surgical intervention to stimulate the nerves that supply the bladder for relaxation)  - Sacral Neuromodulation- surgical intervention, spinal cord stimulation. Must be willing to accept trial period prior to complete implantation

## 2024-01-04 NOTE — PATIENT INSTRUCTIONS
Avoid known bladder irritants:   Coffee - Regular & Decaf  Tea - caffeinated  Carbonated beverages - cola, non-dawit, diet & caffeine-free  Alcohols - Beer, Red Wine, White Wine, Champagne  Fruits - Grapefruit, Lemon, Orange, Pineapple  Fruit Juices - Cranberry, Grapefruit, Orange, Pineapple  Vegetables - Tomato & Tomato Products  Flavor Enhancers - Hot peppers, Spicy foods, Chili, Horseradish, Vinegar, Monosodium glutamate (MSG)  Artificial Sweeteners - NutraSweet, Sweet N Low, Equal (sweetener), Saccharin      SUFU OAB pamphlet provided

## 2024-01-09 NOTE — PROGRESS NOTES
"[facility]  Occupational Therapy Treatment Note     Date: 1/10/2024  Name: Mariangel Moreau  Clinic Number: [id number]    Therapy Diagnosis:   Encounter Diagnoses   Name Primary?    Decreased  strength of left hand Yes    Decreased range of motion of finger of left hand      Physician: Almaz Tristan FNP    Physician Orders: Eval and Treat  Medical Diagnosis: trigger finger  Surgical Procedure and Date: n/a  Evaluation Date: 01/03/2024   Insurance Authorization Period Expiration: 12/31/24  Plan of Care Certification Period: 1/3/24-2/28/24  Date of Return to MD: not appointed     Visit # / Visits authorized: 1 / 10 (eval not included)    Foto:  visit 4     Time In: 7:43  am  Time Out: 8:30  am  Total Billable Time: 47 minutes     Precautions:  Diabetes       Subjective     Patient reports: I think I told you the wrong finger.  It is my long finger and my ring finger(indicating left hand).  She was not compliant with home exercise program given last session.   Response to previous treatment: felt good  Functional change: none noted    Pain: 5/10  Location: left long and ring fingers      Objective     Objective Measures updated at progress report unless specified.    Treatment     Mariangel received the treatments listed below:      supervised modalities after being cleared for contradictions: Fluido  Patient received fluidotherapy to left  hand(s) for 8 minutes to increase blood flow, circulation, desensitization, sensory re-education and for pain management.     manual therapy techniques: Soft tissue Mobilization were applied to the: left long and ring fingers for 25 minutes, including:  Using finger tools "t" and shaista for left long and ring finger tendon and intrinsic work    therapeutic exercises to develop ROM and flexibility for 14 minutes including:  Passive PIP and composite flexion of left long and ring finger  Isolated active PIP and MP of long and ring fingers  Ergo gripper 1 " yellow band(1peg) 20 reps   Rotation of large spheres on table top x 4 min         Patient Education and Home Exercises     Education provided:   - encouraged her to perform her HEP  - Progress towards goals     Written Home Exercises Provided: Patient instructed to cont prior HEP.  Exercises were reviewed and Mariangel was able to demonstrate them prior to the end of the session.  Mariangel demonstrated fair  understanding of the home exercise program provided. See electronic medical record under Patient Instructions for exercises provided during therapy sessions.       Assessment     Mariangel seems forgetful as she stated she told me the wrong fingers involved during eval.  She was not compliant with her HEP and has not used her trigger finger splints.       Mariangel is not progressing  towards her goals and there are no updates to goals at this time. Pt prognosis is Fair.     Patient will continue to benefit from skilled outpatient occupational therapy to address the deficits listed in the problem list on initial evaluation provide patient/family education and to maximize patient's level of independence in the home and community environment.     Patient's spiritual, cultural and educational needs considered and patient agreeable to plan of care and goals.    Anticipated barriers to occupational therapy: her memory    Goals:  Short term goals to be met in 4 weeks(1/31/24)   Patient to be IND with HEP and modalities for pain/edema managment.  Increase ROM 5 degrees to increase functional hand use for ADLs/work/leisure activities.  Increase  strength 4 lbs. to improve functional grasp for ADLs/work/leisure activities.   Decrease complaints of pain to  3 out of 10 at worst to increase functional hand use for ADL/work/leisure activities.      Long term goals to be met by discharge:  Pt will achieve left ring finger tip to palm to improve functional use of left hand  Pt will report [place] of 1 out 10 with left hand use   Pt will  increase left  to 15# to improve functional use of left hand in household tasks    Plan   Manual therapy and modalities as need. Progression of exercise to strengthening as tolerated.   Updates/Grading for next session: none    ERON Day   1/10/2024

## 2024-01-10 ENCOUNTER — CLINICAL SUPPORT (OUTPATIENT)
Dept: REHABILITATION | Facility: HOSPITAL | Age: 79
End: 2024-01-10
Payer: MEDICARE

## 2024-01-10 DIAGNOSIS — R29.898 DECREASED GRIP STRENGTH OF LEFT HAND: Primary | ICD-10-CM

## 2024-01-10 DIAGNOSIS — M25.642 DECREASED RANGE OF MOTION OF FINGER OF LEFT HAND: ICD-10-CM

## 2024-01-10 PROCEDURE — 97140 MANUAL THERAPY 1/> REGIONS: CPT | Mod: PN

## 2024-01-10 PROCEDURE — 97022 WHIRLPOOL THERAPY: CPT | Mod: PN

## 2024-01-10 PROCEDURE — 97110 THERAPEUTIC EXERCISES: CPT | Mod: PN

## 2024-01-16 NOTE — PROGRESS NOTES
[facility]  Occupational Therapy Treatment Note     Date: 1/18/2024  Name: Mariangel Moreau  Clinic Number: [id number]    Therapy Diagnosis:   Encounter Diagnoses   Name Primary?    Decreased  strength of left hand Yes    Decreased range of motion of finger of left hand      Physician: Almaz Tristan FNP    Physician Orders: Eval and Treat  Medical Diagnosis: trigger finger  Surgical Procedure and Date: n/a  Evaluation Date: 01/03/2024   Insurance Authorization Period Expiration: 12/31/24  Plan of Care Certification Period: 1/3/24-2/28/24  Date of Return to MD: not appointed     Visit # / Visits authorized: 2 / 10 (eval not included)    Foto:  visit 4     Time In: 10:05  am  Time Out: 10:49 am  Total Billable Time: 39 minutes     Precautions:  Diabetes       Subjective     Patient reports: My hands and feet kept me awake last night..  She was not compliant with home exercise program given last session.   Response to previous treatment: it was good after therapy  Functional change: I am using my left hand    Pain: 3-4/10  Location: left long and ring fingers      Objective     Objective Measures updated at progress report unless specified.    Treatment     Mariangel received the treatments listed below:      supervised modalities after being cleared for contradictions: Fluido  Patient received paraffin bath to left hand(s) for until cooled  to increase blood flow, circulation, pain management and for tissue elasticity prior to therex.  .     manual therapy techniques: Soft tissue Mobilization were applied to the: left long and ring fingers for 24 minutes, including:  Using finger tools shaista and flat for left long and ring finger tendon and intrinsic work  Vertical and horizontal use of tools in palm  Mobilization of ring and long fingers    therapeutic exercises to develop ROM and flexibility for 15 minutes including:  Passive  composite flexion of left long and ring finger  Isolated  active PIP and MP of long and ring fingers 10 reps each finger  Hook fist x 10 reps  Isolated long and ring finger extension x 10 reps each  Ergo gripper 1 yellow band(2 pegs) 3 min      Patient Education and Home Exercises     Education provided:   - encouraged her to perform her HEP  - Progress towards goals     Written Home Exercises Provided: Patient instructed to cont prior HEP.  Exercises were reviewed and Mariangel was able to demonstrate them prior to the end of the session.  Mariangel demonstrated fair  understanding of the home exercise program provided. See electronic medical record under Patient Instructions for exercises provided during therapy sessions.       Assessment     Mariangel with improvement in her hand pain post therapy session.  Patient was guarding her fingers with attempts with assistive exercises.  She report better use of hands.      Mariangel is not progressing  towards her goals and there are no updates to goals at this time. Pt prognosis is Fair.     Patient will continue to benefit from skilled outpatient occupational therapy to address the deficits listed in the problem list on initial evaluation provide patient/family education and to maximize patient's level of independence in the home and community environment.     Patient's spiritual, cultural and educational needs considered and patient agreeable to plan of care and goals.    Anticipated barriers to occupational therapy: her memory    Goals:  Short term goals to be met in 4 weeks(1/31/24)   Patient to be IND with HEP and modalities for pain/edema managment.  Increase ROM 5 degrees to increase functional hand use for ADLs/work/leisure activities.  Increase  strength 4 lbs. to improve functional grasp for ADLs/work/leisure activities.   Decrease complaints of pain to  3 out of 10 at worst to increase functional hand use for ADL/work/leisure activities.      Long term goals to be met by discharge:  Pt will achieve left ring finger tip to  palm to improve functional use of left hand  Pt will report [place] of 1 out 10 with left hand use   Pt will increase left  to 15# to improve functional use of left hand in household tasks    Plan   Manual therapy and modalities as need. Progression of exercise to strengthening as tolerated.   Updates/Grading for next session: none    ERON Day   1/18/2024

## 2024-01-18 ENCOUNTER — CLINICAL SUPPORT (OUTPATIENT)
Dept: REHABILITATION | Facility: HOSPITAL | Age: 79
End: 2024-01-18
Payer: MEDICARE

## 2024-01-18 DIAGNOSIS — R29.898 DECREASED GRIP STRENGTH OF LEFT HAND: Primary | ICD-10-CM

## 2024-01-18 DIAGNOSIS — M25.642 DECREASED RANGE OF MOTION OF FINGER OF LEFT HAND: ICD-10-CM

## 2024-01-18 PROCEDURE — 97140 MANUAL THERAPY 1/> REGIONS: CPT | Mod: PN

## 2024-01-18 PROCEDURE — 97110 THERAPEUTIC EXERCISES: CPT | Mod: PN

## 2024-01-18 PROCEDURE — 97018 PARAFFIN BATH THERAPY: CPT | Mod: 59,PN

## 2024-01-23 NOTE — PROGRESS NOTES
[facility]  Occupational Therapy Treatment Note     Date: 1/24/2024  Name: Mariangel Moreau  Clinic Number: [id number]    Therapy Diagnosis:   Encounter Diagnoses   Name Primary?    Decreased  strength of left hand Yes    Decreased range of motion of finger of left hand      Physician: Almaz Tristan FNP    Physician Orders: Eval and Treat  Medical Diagnosis: trigger finger  Surgical Procedure and Date: n/a  Evaluation Date: 01/03/2024   Insurance Authorization Period Expiration: 12/31/24  Plan of Care Certification Period: 1/3/24-2/28/24  Date of Return to MD: not appointed     Visit # / Visits authorized: 3 / 10 (eval not included)    Foto:  visit 4     Time In: 10:06  am  Time Out: 10:55 am  Total Billable Time: 39  minutes     Precautions:  Diabetes       Subjective     Patient reports: My hand just still feels stiff..  She was not compliant with home exercise program provided   Response to previous treatment: felt good  Functional change: I use my hand all the time    Pain: 3/10  Location: left long and ring fingers      Objective     Objective Measures updated at progress report unless specified.    Treatment     Mariangel received the treatments listed below:      supervised modalities after being cleared for contradictions: Fluido  Patient received fluidotherapy to right hand(s) for 8 minutes to increase blood flow, circulation, desensitization, sensory re-education and for pain management.     manual therapy techniques: Soft tissue Mobilization were applied to the: left long and ring fingers for 10 minutes, including:  Using finger tools shaista and flat for left long and ring finger tendon and intrinsic work  Vertical and horizontal use of tools in palm    therapeutic exercises to develop ROM and flexibility for 11 minutes including:  Passive  composite flexion of left long and ring finger  Isolated active PIP and MP of long and ring fingers 10 reps each finger  Isolated long and  ring finger extension from tabled top x 10 reps each  Rotation of small spheres on table x 4 min    Orthotic fit and train. x 10 min- Trigger finger orthotic, good fit, education regarding purpose, wear and care       Patient Education and Home Exercises     Education provided:   -Orthotic instructions written provided  - encouraged her to perform her HEP  - Progress towards goals     Written Home Exercises Provided: Patient instructed to cont prior HEP.  Exercises were reviewed and Mariangel was able to demonstrate them prior to the end of the session.  Mariangel demonstrated fair  understanding of the home exercise program provided. See electronic medical record under Patient Instructions for exercises provided during therapy sessions.       Assessment     Mariangel with good fit of orthotic.  Good tolerance to active exercises performed today no complaints of pain post therapy    Mariangel is not progressing  towards her goals and there are no updates to goals at this time. Pt prognosis is Fair.     Patient will continue to benefit from skilled outpatient occupational therapy to address the deficits listed in the problem list on initial evaluation provide patient/family education and to maximize patient's level of independence in the home and community environment.     Patient's spiritual, cultural and educational needs considered and patient agreeable to plan of care and goals.    Anticipated barriers to occupational therapy: her memory    Goals:  Short term goals to be met in 4 weeks(1/31/24)   Patient to be IND with HEP and modalities for pain/edema managment.  Increase ROM 5 degrees to increase functional hand use for ADLs/work/leisure activities.  Increase  strength 4 lbs. to improve functional grasp for ADLs/work/leisure activities.   Decrease complaints of pain to  3 out of 10 at worst to increase functional hand use for ADL/work/leisure activities.      Long term goals to be met by discharge:  Pt will achieve  left ring finger tip to palm to improve functional use of left hand  Pt will report [place] of 1 out 10 with left hand use   Pt will increase left  to 15# to improve functional use of left hand in household tasks    Plan   Manual therapy and modalities as need. Progression of exercise to strengthening as tolerated.   Updates/Grading for next session: none    ERON Day   1/24/2024

## 2024-01-24 ENCOUNTER — CLINICAL SUPPORT (OUTPATIENT)
Dept: REHABILITATION | Facility: HOSPITAL | Age: 79
End: 2024-01-24
Payer: MEDICARE

## 2024-01-24 DIAGNOSIS — M25.642 DECREASED RANGE OF MOTION OF FINGER OF LEFT HAND: ICD-10-CM

## 2024-01-24 DIAGNOSIS — R29.898 DECREASED GRIP STRENGTH OF LEFT HAND: Primary | ICD-10-CM

## 2024-01-24 PROCEDURE — 97140 MANUAL THERAPY 1/> REGIONS: CPT | Mod: PN

## 2024-01-24 PROCEDURE — 97110 THERAPEUTIC EXERCISES: CPT | Mod: 59,PN

## 2024-01-24 PROCEDURE — 97760 ORTHOTIC MGMT&TRAING 1ST ENC: CPT | Mod: PN

## 2024-01-24 PROCEDURE — 97022 WHIRLPOOL THERAPY: CPT | Mod: PN

## 2024-01-31 ENCOUNTER — DOCUMENTATION ONLY (OUTPATIENT)
Dept: REHABILITATION | Facility: HOSPITAL | Age: 79
End: 2024-01-31
Payer: MEDICARE

## 2024-01-31 DIAGNOSIS — R29.898 DECREASED GRIP STRENGTH OF LEFT HAND: Primary | ICD-10-CM

## 2024-01-31 DIAGNOSIS — M25.642 DECREASED RANGE OF MOTION OF FINGER OF LEFT HAND: ICD-10-CM

## 2024-01-31 NOTE — PROGRESS NOTES
Cancellation Note    Patient: Mariangel Moreau  Date of Session: 1/31/2024  Diagnosis:   1. Decreased  strength of left hand        2. Decreased range of motion of finger of left hand          MRN: [id number]    Mariangel Moreau cancelled her scheduled therapy appointment today secondary to not feeling well.  This is the first appointment that Mariangel has not attended. Next appointment is scheduled for 2/21/24 and will follow up with patient at that time. No charges have been posted today.       ERON Day  1/31/2024

## 2024-02-21 ENCOUNTER — DOCUMENTATION ONLY (OUTPATIENT)
Dept: REHABILITATION | Facility: HOSPITAL | Age: 79
End: 2024-02-21
Payer: MEDICARE

## 2024-02-21 DIAGNOSIS — R29.898 DECREASED GRIP STRENGTH OF LEFT HAND: Primary | ICD-10-CM

## 2024-02-21 DIAGNOSIS — M25.642 DECREASED RANGE OF MOTION OF FINGER OF LEFT HAND: ICD-10-CM

## 2024-02-21 NOTE — PROGRESS NOTES
No Show Note/Documentation    Patient: Mariangel Moreau  Date of Session: 2/21/2024  Diagnosis:   1. Decreased  strength of left hand        2. Decreased range of motion of finger of left hand          MRN: [id number]    Mariangel Moreau did not attend her scheduled therapy appointment today. She did not call to cancel nor reschedule. This is the second appointment that Mariangel has not attended. No additional appointments are scheduled. No charges have been posted today.       ERON Day  2/21/2024

## 2024-03-06 ENCOUNTER — DOCUMENTATION ONLY (OUTPATIENT)
Dept: REHABILITATION | Facility: HOSPITAL | Age: 79
End: 2024-03-06
Payer: MEDICARE

## 2024-03-06 DIAGNOSIS — R29.898 DECREASED GRIP STRENGTH OF LEFT HAND: Primary | ICD-10-CM

## 2024-03-06 DIAGNOSIS — M25.642 DECREASED RANGE OF MOTION OF FINGER OF LEFT HAND: ICD-10-CM

## 2024-03-06 NOTE — PROGRESS NOTES
[facility]  Occupational Therapy  Discharge Note    Name: Mariangel Moreau  Clinic Number: [id number]    Therapy Diagnosis:   Encounter Diagnoses   Name Primary?    Decreased  strength of left hand Yes    Decreased range of motion of finger of left hand      Physician: Almaz Tristan FNP     Physician Orders: Eval and Treat  Medical Diagnosis: trigger finger  Surgical Procedure and Date: n/a  Evaluation Date: 01/03/2024       Date of Last visit: 1/24/24  Total Visits Received: 4    ASSESSMENT      Pt has not returned to therapy for assessment of her condition.     Discharge reason: Patient has not attended therapy since 1/24/24    Discharge FOTO Score: not acquired    Goals:   Short term goals to be met in 4 weeks(1/31/24)   Patient to be IND with HEP and modalities for pain/edema managment.  Increase ROM 5 degrees to increase functional hand use for ADLs/work/leisure activities.  Increase  strength 4 lbs. to improve functional grasp for ADLs/work/leisure activities.   Decrease complaints of pain to  3 out of 10 at worst to increase functional hand use for ADL/work/leisure activities.      Long term goals to be met by discharge:  Pt will achieve left ring finger tip to palm to improve functional use of left hand  Pt will report [place] of 1 out 10 with left hand use   Pt will increase left  to 15# to improve functional use of left hand in household tasks       PLAN   This patient is discharged from Occupational Therapy      ERON Day

## 2024-04-30 ENCOUNTER — OFFICE VISIT (OUTPATIENT)
Dept: OBSTETRICS AND GYNECOLOGY | Facility: CLINIC | Age: 79
End: 2024-04-30
Payer: MEDICARE

## 2024-04-30 VITALS
WEIGHT: 163.13 LBS | DIASTOLIC BLOOD PRESSURE: 90 MMHG | SYSTOLIC BLOOD PRESSURE: 140 MMHG | BODY MASS INDEX: 24.09 KG/M2

## 2024-04-30 DIAGNOSIS — Z91.89 GYN EXAM FOR HIGH-RISK MEDICARE PATIENT: Primary | ICD-10-CM

## 2024-04-30 DIAGNOSIS — Z12.4 CERVICAL CANCER SCREENING: ICD-10-CM

## 2024-04-30 DIAGNOSIS — N32.81 OVERACTIVE BLADDER: ICD-10-CM

## 2024-04-30 PROCEDURE — 88175 CYTOPATH C/V AUTO FLUID REDO: CPT | Performed by: OBSTETRICS & GYNECOLOGY

## 2024-04-30 PROCEDURE — 87624 HPV HI-RISK TYP POOLED RSLT: CPT | Performed by: OBSTETRICS & GYNECOLOGY

## 2024-04-30 PROCEDURE — 3077F SYST BP >= 140 MM HG: CPT | Mod: CPTII,S$GLB,, | Performed by: OBSTETRICS & GYNECOLOGY

## 2024-04-30 PROCEDURE — 99999 PR PBB SHADOW E&M-EST. PATIENT-LVL III: CPT | Mod: PBBFAC,,, | Performed by: OBSTETRICS & GYNECOLOGY

## 2024-04-30 PROCEDURE — 1159F MED LIST DOCD IN RCRD: CPT | Mod: CPTII,S$GLB,, | Performed by: OBSTETRICS & GYNECOLOGY

## 2024-04-30 PROCEDURE — 1101F PT FALLS ASSESS-DOCD LE1/YR: CPT | Mod: CPTII,S$GLB,, | Performed by: OBSTETRICS & GYNECOLOGY

## 2024-04-30 PROCEDURE — 3080F DIAST BP >= 90 MM HG: CPT | Mod: CPTII,S$GLB,, | Performed by: OBSTETRICS & GYNECOLOGY

## 2024-04-30 PROCEDURE — 3288F FALL RISK ASSESSMENT DOCD: CPT | Mod: CPTII,S$GLB,, | Performed by: OBSTETRICS & GYNECOLOGY

## 2024-04-30 PROCEDURE — G0101 CA SCREEN;PELVIC/BREAST EXAM: HCPCS | Mod: S$GLB,,, | Performed by: OBSTETRICS & GYNECOLOGY

## 2024-04-30 PROCEDURE — 1160F RVW MEDS BY RX/DR IN RCRD: CPT | Mod: CPTII,S$GLB,, | Performed by: OBSTETRICS & GYNECOLOGY

## 2024-04-30 RX ORDER — OXYBUTYNIN CHLORIDE 5 MG/1
5 TABLET ORAL 3 TIMES DAILY PRN
Qty: 90 TABLET | Refills: 1 | Status: SHIPPED | OUTPATIENT
Start: 2024-04-30 | End: 2024-06-05

## 2024-04-30 NOTE — PROGRESS NOTES
[facility]  Ambulatory Clinic  Obstetrics & Gynecology    Visit Date:  2024    Chief Complaint:  Annual GYN exam, overactive bladder    History of Present Illness:      Mariangel Moreau is a 78 y.o. , new pt to me, here for a gynecologic exam with c/o overactive bladder symptoms for past year.    Pt reports sxs more at night time.      Pt reports an uneventful transition into menopause and is not on hormone replacement therapy.  Pt denies postmenopausal bleeding.    Pt denies h/o abnormal pap.    Last mammo ~2 at [facility].    Pt denies vaginal bleeding, vaginal discharge, dyspareunia, pelvic pain, bloating, early satiety, unintentional weight loss, breast mass/skin changes, GI complaints.      Otherwise, the pt is in her usual state of health.    Past History:  Gynecologic history as noted above.    Review of Systems:      GENERAL:  No fever, fatigue, excessive weight gain or loss  HEENT:  No headaches, hearing changes, visual disturbance  RESPIRATORY:  No cough, shortness of breath  CARDIOVASCULAR:  No chest pain, heart palpitations, leg swelling  BREAST:  No lump, pain, nipple discharge, skin changes  GASTROINTESTINAL:  No nausea, vomiting, constipation, diarrhea, abd pain, rectal bleeding   GENITOURINARY:  See HPI  ENDOCRINE:  No heat or cold intolerance  HEMATOLOGIC:  No easy bruisability or bleeding   LYMPHATICS:  No enlarged nodes  MUSCULOSKELETAL:  No acute joint pain or swelling  SKIN:  No rash, lesions, jaundice  NEUROLOGIC:  No dizziness, weakness, syncope  PSYCHIATRIC:  No significant mood changes, homicidal/suicidal ideations, abuse    Physical Exam:     BP (!) 140/90   Wt 74 kg (163 lb 2.2 oz)   BMI 24.09 kg/m²   Pulse 60's, Resp rate 12     GENERAL:  No acute distress, well-nourished  HEENT:  Atraumatic, anicteric, moist mucus membranes. Neck supple w/o masses.  BREAST:  Symmetric, nontender, no obvious masses, adenopathy, skin changes or nipple discharge.  LUNGS:  Clear  normal respiratory effort  HEART:  Regular rate and rhythm  ABDOMEN:  Soft, non-tender, non-distended, normoactive bowel sounds, no obvious organomegaly  EXT:  Symmetric w/o cramping, claudication, or edema. +2 distal pulses.  SKIN:  No rashes or bruising  PSYCH:  Mood and affect appropriate  NEURO:  Grossly intact bilaterally    GENITOURINARY:    VULVAR:  Female external genitalia w/o obvious lesions. Normal urethral meatus. No gross lymphadenopathy.   VAGINA:  Mild, age appropriate vulvovaginal atrophy. Adequate support. No significant cystocele or rectocele. No obvious lesion. No discharge.  CERVIX:  No cervical motion tenderness, discharge, or obvious lesions.   UTERUS:  Small, non-tender, normal contour  ADNEXA:  No masses, non-tender    RECTAL:  Deferred. No obvious external lesions    Chaperone present for exam.    Assessment:     78 y.o. :    GYN exam for high-risk Medicare patient  Overactive bladder    Plan:    A gynecologic health assessment was performed with age appropriate counseling.    Cervical cancer screening - pap obtained due to none on file.    Pt states she gets her screening mammogram done at [facility] imaging center and will coordinate this with her PCP.    Discussed various mgt options for overactive bladder.  Change ditropan 5 mg tid prn.  Risks, benefits, and alternatives to ditropan discussed.  Supportive care measures for dry eyes/mouth discussed.  Will refer to urology if no improvement.  Behavioral modifications, voiding diary, dietary advice, avoid caffeinated drinks, decrease fluid intake at night, and Kegel exercises encouraged.    Encourage healthy lifestyle modifications, monthly self breast exams and f/u with PCP for health maintenance.    Return 1 year for gynecologic exam or sooner as needed.  All questions answered, pt voiced understanding.        Sony Caceres MD

## 2024-05-06 LAB
HPV HR 12 DNA SPEC QL NAA+PROBE: NEGATIVE
HPV16 AG SPEC QL: NEGATIVE
HPV18 DNA SPEC QL NAA+PROBE: NEGATIVE

## 2024-05-07 LAB
FINAL PATHOLOGIC DIAGNOSIS: NORMAL
Lab: NORMAL

## 2024-06-04 ENCOUNTER — TELEPHONE (OUTPATIENT)
Dept: UROLOGY | Facility: CLINIC | Age: 79
End: 2024-06-04
Payer: MEDICARE

## 2024-06-04 NOTE — TELEPHONE ENCOUNTER
Pt was contacted unable to lvm  ----- Message from Claritza Garcia MD sent at 6/4/2024 12:32 PM CDT -----  Regarding: sooner  pls encourage patient to arrive as soon as she can tomorrow - for her appt- so we can get a urine sample and started on abx due to concern for uti

## 2024-06-04 NOTE — TELEPHONE ENCOUNTER
Pt was contacted unable to lvm    ----- Message from Seda Cooper sent at 6/4/2024 11:12 AM CDT -----  Regarding: self [phone]  .Type: Patient Call Back    Who called: self    What is the request in detail:patient requesting a call back regarding urine sensitivity. Patient has bacteria in her urine she's requesting medication for the infection.    [address] [place] [address]  Phone: [phone] Fax: [phone]    Can the clinic reply by [facility]?no    Would the patient rather a call back or a response via [facility]?call back    Best call back number [phone]

## 2024-06-05 ENCOUNTER — OFFICE VISIT (OUTPATIENT)
Dept: UROLOGY | Facility: CLINIC | Age: 79
End: 2024-06-05
Payer: MEDICARE

## 2024-06-05 VITALS — BODY MASS INDEX: 24.92 KG/M2 | WEIGHT: 168.75 LBS

## 2024-06-05 DIAGNOSIS — N32.81 OAB (OVERACTIVE BLADDER): ICD-10-CM

## 2024-06-05 DIAGNOSIS — R39.89 SUSPECTED UTI: Primary | ICD-10-CM

## 2024-06-05 PROCEDURE — 1101F PT FALLS ASSESS-DOCD LE1/YR: CPT | Mod: CPTII,S$GLB,, | Performed by: STUDENT IN AN ORGANIZED HEALTH CARE EDUCATION/TRAINING PROGRAM

## 2024-06-05 PROCEDURE — 87086 URINE CULTURE/COLONY COUNT: CPT | Performed by: STUDENT IN AN ORGANIZED HEALTH CARE EDUCATION/TRAINING PROGRAM

## 2024-06-05 PROCEDURE — 3288F FALL RISK ASSESSMENT DOCD: CPT | Mod: CPTII,S$GLB,, | Performed by: STUDENT IN AN ORGANIZED HEALTH CARE EDUCATION/TRAINING PROGRAM

## 2024-06-05 PROCEDURE — 99214 OFFICE O/P EST MOD 30 MIN: CPT | Mod: S$GLB,,, | Performed by: STUDENT IN AN ORGANIZED HEALTH CARE EDUCATION/TRAINING PROGRAM

## 2024-06-05 PROCEDURE — 1126F AMNT PAIN NOTED NONE PRSNT: CPT | Mod: CPTII,S$GLB,, | Performed by: STUDENT IN AN ORGANIZED HEALTH CARE EDUCATION/TRAINING PROGRAM

## 2024-06-05 PROCEDURE — 1160F RVW MEDS BY RX/DR IN RCRD: CPT | Mod: CPTII,S$GLB,, | Performed by: STUDENT IN AN ORGANIZED HEALTH CARE EDUCATION/TRAINING PROGRAM

## 2024-06-05 PROCEDURE — 1159F MED LIST DOCD IN RCRD: CPT | Mod: CPTII,S$GLB,, | Performed by: STUDENT IN AN ORGANIZED HEALTH CARE EDUCATION/TRAINING PROGRAM

## 2024-06-05 PROCEDURE — 99999 PR PBB SHADOW E&M-EST. PATIENT-LVL III: CPT | Mod: PBBFAC,,, | Performed by: STUDENT IN AN ORGANIZED HEALTH CARE EDUCATION/TRAINING PROGRAM

## 2024-06-05 RX ORDER — VIBEGRON 75 MG/1
75 TABLET, FILM COATED ORAL DAILY
Qty: 30 TABLET | Refills: 2 | Status: SHIPPED | OUTPATIENT
Start: 2024-06-05 | End: 2024-09-03

## 2024-06-05 RX ORDER — NITROFURANTOIN 25; 75 MG/1; MG/1
100 CAPSULE ORAL EVERY 12 HOURS
Qty: 10 CAPSULE | Refills: 0 | Status: SHIPPED | OUTPATIENT
Start: 2024-06-05 | End: 2024-06-10

## 2024-06-05 NOTE — PROGRESS NOTES
" Patient ID: Mariangel Moreau is a 78 y.o. female.    Chief Complaint: No chief complaint on file.    Referral: No referring provider defined for this encounter.     HPI  78 y.o. who presents to the Urology clinic for evaluation of OAB and dysuria. Patient presented to PCP w/ concern for UTI several weeks ago, reportedly patient was not found to have UTI but received 3 days of abx. No hematuria, flank pain.No constipation. Previously prescribed oxybutynin by PCP for OAB symptoms- sh did not tolerate the side effects.       Medically Necessary ROS documented in HPI    Past Medical History  Active Ambulatory Problems     Diagnosis Date Noted    Foot pain, bilateral 04/17/2015    Fall     Chest pain     Type 2 diabetes mellitus with complication, without long-term current use of insulin 03/28/2017    Pure hypercholesterolemia 03/28/2017    Sarcoidosis 03/28/2017    Neck pain     Screening for colon cancer 09/13/2019    Arm mass, right 03/04/2022     Resolved Ambulatory Problems     Diagnosis Date Noted    Decreased  strength of left hand 01/03/2024    Decreased range of motion of finger of left hand 01/03/2024     Past Medical History:   Diagnosis Date    Aortic atherosclerosis     Diabetes mellitus     High cholesterol     Hypertension          Past Surgical History  Past Surgical History:   Procedure Laterality Date    CHOLECYSTECTOMY      COLONOSCOPY N/A 9/13/2019    Procedure: COLONOSCOPY;  Surgeon: Miguelito Garcia MD;  Location: [facility] ([address] [place] [address]);  Service: Endoscopy;  Laterality: N/A;  Referral from SABRINA Gonzalez, [facility]  Ht: 5'9"  Wt: 180 lbs BMI: 26.2    SURGICAL REMOVAL OF MASS OF UPPER EXTREMITY Right 3/4/2022    Procedure: EXCISION, MASS, UPPER EXTREMITY;  Surgeon: Miller Saravia MD;  Location: [facility];  Service: General;  Laterality: Right;  CALLED AND SPOKE TO DAUGHTER ON 2/25/2022. DAUGHTER WILL CALL BACK IN 1 HOUR  WITH MOM AND BOOK PRE OP APPOINTMENT. 12:32PM-LO  RN PREOP ON 3/3/22; COVID " NEGATIVE       Social History       Medications    Current Outpatient Medications:     ammonium lactate 12 % Crea, Apply 1 application topically once daily., Disp: 140 g, Rfl: 5    aspirin (ECOTRIN) 81 MG EC tablet, Take 1 tablet (81 mg total) by mouth once daily., Disp: 90 tablet, Rfl: 3    ciclopirox (PENLAC) 8 % Soln, Apply topically nightly., Disp: 6.6 mL, Rfl: 3    diclofenac sodium (VOLTAREN) 1 % Gel, Apply 2 g topically 4 (four) times daily as needed., Disp: 100 g, Rfl: 0    ergocalciferol (ERGOCALCIFEROL) 50,000 unit Cap, Take 50,000 Units by mouth every 7 days., Disp: , Rfl:     fluconazole (DIFLUCAN) 150 MG Tab, Take 150 mg by mouth once as needed., Disp: , Rfl:     glipiZIDE (GLUCOTROL) 10 MG TR24, , Disp: , Rfl:     ibuprofen (ADVIL,MOTRIN) 600 MG tablet, Take 1 tablet (600 mg total) by mouth 3 (three) times daily., Disp: 30 tablet, Rfl: 1    LIDOcaine (LIDODERM) 5 %, Place 1 patch onto the skin once daily. Remove & Discard patch within 12 hours or as directed by MD, Disp: 15 patch, Rfl: 5    LIDOcaine (SALONPAS, LIDOCAINE,) 4 % PtMd, Apply 1 patch topically once daily., Disp: 10 patch, Rfl: 5    lisinopril (PRINIVIL,ZESTRIL) 40 MG tablet, Take 40 mg by mouth once daily., Disp: , Rfl:     LORazepam (ATIVAN) 0.5 MG tablet, Take 2 tablets (1 mg total) by mouth every evening., Disp: 60 tablet, Rfl: 0    meloxicam (MOBIC) 7.5 MG tablet, Take 7.5 mg by mouth once daily., Disp: , Rfl:     metFORMIN (GLUCOPHAGE) 500 MG tablet, Take 500 mg by mouth 2 (two) times daily., Disp: , Rfl:     multivitamin capsule, Take 1 capsule by mouth once daily., Disp: , Rfl:     mupirocin (BACTROBAN) 2 % ointment, Use qd on wound, Disp: 30 g, Rfl: 2    oxybutynin (DITROPAN) 5 MG Tab, Take 1 tablet (5 mg total) by mouth 3 (three) times daily as needed (overactive bladder)., Disp: 90 tablet, Rfl: 1    predniSONE (DELTASONE) 5 MG tablet, Take 2 tablets (10 mg total) by mouth once daily., Disp: 60 tablet, Rfl: 6    Allergies  Review  of patient's allergies indicates:   Allergen Reactions    Darvocet a500 [propoxyphene n-acetaminophen] Swelling    Penicillins Swelling    Percocet [oxycodone-acetaminophen]     Tramadol Nausea And Vomiting       Patient's PMH, FH, Social hx, Medications, allergies reviewed and updated as pertinent to today's visit    Objective:      Physical Exam  Constitutional:       Appearance: She is well-developed.   HENT:      Head: Normocephalic and atraumatic.   Eyes:      Conjunctiva/sclera: Conjunctivae normal.   Pulmonary:      Effort: Pulmonary effort is normal. No respiratory distress.   Abdominal:      General: Abdomen is flat. There is no distension.      Palpations: Abdomen is soft. There is no mass.      Tenderness: There is no abdominal tenderness. There is no right CVA tenderness, left CVA tenderness or guarding.   Skin:     General: Skin is warm.      Findings: No rash.   Neurological:      Mental Status: She is alert and oriented to person, place, and time.   Psychiatric:         Behavior: Behavior normal.             Assessment:       1. Suspected UTI    2. OAB (overactive bladder)        Plan:         OAB  Discussed discontinuing oxybutynin as it is contraindicated in pts > 65. Her BP is well controlled with medication. Discussed gemtesa, discussed risks/benefits    Susptected UTI  POCT UA w/ nitrites/WBC  Urine culture  Discussed prior cx may have been contaminated  Macrobid q12hr 100mg provided    Cystoscopy discussed and planned for 6-8 weeks

## 2024-06-07 ENCOUNTER — TELEPHONE (OUTPATIENT)
Dept: UROLOGY | Facility: CLINIC | Age: 79
End: 2024-06-07
Payer: MEDICARE

## 2024-06-07 LAB — BACTERIA UR CULT: NORMAL

## 2024-06-07 NOTE — TELEPHONE ENCOUNTER
Urine testing from 5/27/24 reviewed  UA w/ possible  infection, urine culture 50-100k CFU mixed ronny- contaminated    
Yes

## 2024-06-26 ENCOUNTER — TELEPHONE (OUTPATIENT)
Dept: UROLOGY | Facility: CLINIC | Age: 79
End: 2024-06-26
Payer: MEDICARE

## 2024-06-26 NOTE — TELEPHONE ENCOUNTER
----- Message from Allison Beriros sent at 6/26/2024 10:11 AM CDT -----  Regarding: Patient call back  .Type: Patient Call Back    Who called:Self     What is the request in detail:would like to know if she has any specific instructions she has to do for the procedure on 07/17/2024    Can the clinic reply by [facility]?no     Would the patient rather a call back or a response via [facility]? Call     Best call back number:.[phone]      Additional Information:

## 2024-07-17 ENCOUNTER — TELEPHONE (OUTPATIENT)
Dept: UROLOGY | Facility: CLINIC | Age: 79
End: 2024-07-17

## 2024-07-17 ENCOUNTER — PROCEDURE VISIT (OUTPATIENT)
Dept: UROLOGY | Facility: CLINIC | Age: 79
End: 2024-07-17
Payer: MEDICARE

## 2024-07-17 VITALS — BODY MASS INDEX: 24.74 KG/M2 | WEIGHT: 167.56 LBS

## 2024-07-17 DIAGNOSIS — R39.89 SUSPECTED UTI: ICD-10-CM

## 2024-07-17 DIAGNOSIS — R91.1 NODULE OF RIGHT LUNG: ICD-10-CM

## 2024-07-17 DIAGNOSIS — N32.81 OAB (OVERACTIVE BLADDER): Primary | ICD-10-CM

## 2024-07-17 PROCEDURE — 52000 CYSTOURETHROSCOPY: CPT | Mod: S$GLB,,, | Performed by: STUDENT IN AN ORGANIZED HEALTH CARE EDUCATION/TRAINING PROGRAM

## 2024-07-17 RX ORDER — NITROFURANTOIN 25; 75 MG/1; MG/1
100 CAPSULE ORAL EVERY 12 HOURS
Qty: 10 CAPSULE | Refills: 0 | Status: SHIPPED | OUTPATIENT
Start: 2024-07-17 | End: 2024-07-22

## 2024-07-17 NOTE — PROCEDURES
Mariangel Moreau is a 79 y.o. female patient.    Weight: 76 kg (167 lb 8.8 oz) (07/17/24 1045)       Cystoscopy    Date/Time: 7/17/2024 6:39 PM    Performed by: Claritza Garcia MD  Authorized by: Claritza Garcia MD    Consent Done?:  Yes (Written)  Timeout: prior to procedure the correct patient, procedure, and site was verified    Prep: patient was prepped and draped in usual sterile fashion    Local anesthesia used?: Yes    Anesthesia:  Intraurethral instillation  Local anesthetic:  Lidocaine 2% topical gel  Indications: overactive bladder    Position:  Dorsal lithotomy  Anesthesia:  Intraurethral instillation  Patient sedated?: No    Preparation: Patient was prepped and draped in usual sterile fashion    Scope type:  Flexible cystoscope  External exam normal: Yes    Digital exam performed: Yes    Urethra normal: Yes    Bladder neck normal: Yes    Bladder normal: Yes     patient tolerated the procedure well with no immediate complications  Comments:      Systematic evaluation of bladder without evidence of papillary mass, erythematous patches or bloody efflux from ureters.     Urine culture    RTC to discuss options for management as gemtesa not helpful.     Discussed with patient Lung nodule from 2022 CT chest needs follow up       7/17/2024

## 2024-07-17 NOTE — TELEPHONE ENCOUNTER
Pt was contacted. Pt informed medication was sent to pharmacy  ----- Message from Claritza Garcia MD sent at 7/17/2024  2:31 PM CDT -----  Should be there now  ----- Message -----  From: Earle Jean MA  Sent: 7/17/2024  12:49 PM CDT  To: Claritza Garcia MD    Please advise.  ----- Message -----  From: Hortensia Bryant  Sent: 7/17/2024  12:44 PM CDT  To: Radha Thomas    Good Afternoon,     The patient called stating she just saw Dr. Garcia and was told she would send in a prescription for her, she went to the pharmacy and nothing is there. Please contact patient .      Thank you   Hortensia  [facility]

## 2024-08-14 ENCOUNTER — OFFICE VISIT (OUTPATIENT)
Dept: UROLOGY | Facility: CLINIC | Age: 79
End: 2024-08-14
Payer: MEDICARE

## 2024-08-14 VITALS — BODY MASS INDEX: 25.81 KG/M2 | WEIGHT: 174.25 LBS | HEIGHT: 69 IN

## 2024-08-14 DIAGNOSIS — N32.81 OAB (OVERACTIVE BLADDER): Primary | ICD-10-CM

## 2024-08-14 DIAGNOSIS — N39.41 URINARY INCONTINENCE, URGE: ICD-10-CM

## 2024-08-14 PROCEDURE — 99999 PR PBB SHADOW E&M-EST. PATIENT-LVL III: CPT | Mod: PBBFAC,,, | Performed by: STUDENT IN AN ORGANIZED HEALTH CARE EDUCATION/TRAINING PROGRAM

## 2024-08-14 PROCEDURE — 1126F AMNT PAIN NOTED NONE PRSNT: CPT | Mod: CPTII,S$GLB,, | Performed by: STUDENT IN AN ORGANIZED HEALTH CARE EDUCATION/TRAINING PROGRAM

## 2024-08-14 PROCEDURE — 1101F PT FALLS ASSESS-DOCD LE1/YR: CPT | Mod: CPTII,S$GLB,, | Performed by: STUDENT IN AN ORGANIZED HEALTH CARE EDUCATION/TRAINING PROGRAM

## 2024-08-14 PROCEDURE — 1160F RVW MEDS BY RX/DR IN RCRD: CPT | Mod: CPTII,S$GLB,, | Performed by: STUDENT IN AN ORGANIZED HEALTH CARE EDUCATION/TRAINING PROGRAM

## 2024-08-14 PROCEDURE — 3288F FALL RISK ASSESSMENT DOCD: CPT | Mod: CPTII,S$GLB,, | Performed by: STUDENT IN AN ORGANIZED HEALTH CARE EDUCATION/TRAINING PROGRAM

## 2024-08-14 PROCEDURE — 1159F MED LIST DOCD IN RCRD: CPT | Mod: CPTII,S$GLB,, | Performed by: STUDENT IN AN ORGANIZED HEALTH CARE EDUCATION/TRAINING PROGRAM

## 2024-08-14 PROCEDURE — 99213 OFFICE O/P EST LOW 20 MIN: CPT | Mod: S$GLB,,, | Performed by: STUDENT IN AN ORGANIZED HEALTH CARE EDUCATION/TRAINING PROGRAM

## 2024-08-14 RX ORDER — VIBEGRON 75 MG/1
75 TABLET, FILM COATED ORAL DAILY
Qty: 90 TABLET | Refills: 3 | Status: SHIPPED | OUTPATIENT
Start: 2024-08-14 | End: 2025-08-05

## 2024-08-14 NOTE — PROGRESS NOTES
" Patient ID: Mariangel Moreau is a 79 y.o. female.    Chief Complaint: fu OAB    Referral: No referring provider defined for this encounter.     HPI interval   79 y.o. who presents to the Urology clinic for evaluation of OAB, noted gemtesa is working but it is expensive. Patient denies dysuria, hematuria, signs of UTI. She is down to 1 pad per day for security.     HPI 1/4/20204  78 y.o. who presents to the Urology clinic for evaluation of urinary urge incontinence for past several months. No prior management, that patient can recall. Patient wears depends. Notes that she is not able to make it to the restroom without urine leaking down her legs. No prior  procedures. 2 prior vaginal deliveries. Denies leakage of urine with coughing, sneezing, laughing. She drinks very little fluids. No hx of UTIs. Denies odor to urine, blood.     Medically Necessary ROS documented in HPI    Past Medical History  Active Ambulatory Problems     Diagnosis Date Noted    Foot pain, bilateral 04/17/2015    Fall     Chest pain     Type 2 diabetes mellitus with complication, without long-term current use of insulin 03/28/2017    Pure hypercholesterolemia 03/28/2017    Sarcoidosis 03/28/2017    Neck pain     Screening for colon cancer 09/13/2019    Arm mass, right 03/04/2022     Resolved Ambulatory Problems     Diagnosis Date Noted    Decreased  strength of left hand 01/03/2024    Decreased range of motion of finger of left hand 01/03/2024     Past Medical History:   Diagnosis Date    Aortic atherosclerosis     Diabetes mellitus     High cholesterol     Hypertension          Past Surgical History  Past Surgical History:   Procedure Laterality Date    CHOLECYSTECTOMY      COLONOSCOPY N/A 9/13/2019    Procedure: COLONOSCOPY;  Surgeon: Miguelito Garcia MD;  Location: [address];  Service: Endoscopy;  Laterality: N/A;  [facility]  Ht: 5'9"  Wt: 180 lbs BMI: 26.2    SURGICAL REMOVAL OF MASS OF UPPER EXTREMITY " Right 3/4/2022    Procedure: EXCISION, MASS, UPPER EXTREMITY;  Surgeon: Miller Saravia MD;  Location: [facility];  Service: General;  Laterality: Right;  CALLED AND SPOKE TO DAUGHTER ON 2/25/2022. DAUGHTER WILL CALL BACK IN 1 HOUR  WITH MOM AND BOOK PRE OP APPOINTMENT. 12:32PM-LO  RN PREOP ON 3/3/22; COVID NEGATIVE       Social History       Medications    Current Outpatient Medications:     ammonium lactate 12 % Crea, Apply 1 application topically once daily., Disp: 140 g, Rfl: 5    aspirin (ECOTRIN) 81 MG EC tablet, Take 1 tablet (81 mg total) by mouth once daily., Disp: 90 tablet, Rfl: 3    ciclopirox (PENLAC) 8 % Soln, Apply topically nightly., Disp: 6.6 mL, Rfl: 3    ergocalciferol (ERGOCALCIFEROL) 50,000 unit Cap, Take 50,000 Units by mouth every 7 days., Disp: , Rfl:     fluconazole (DIFLUCAN) 150 MG Tab, Take 150 mg by mouth once as needed., Disp: , Rfl:     glipiZIDE (GLUCOTROL) 10 MG TR24, , Disp: , Rfl:     ibuprofen (ADVIL,MOTRIN) 600 MG tablet, Take 1 tablet (600 mg total) by mouth 3 (three) times daily., Disp: 30 tablet, Rfl: 1    LIDOcaine (LIDODERM) 5 %, Place 1 patch onto the skin once daily. Remove & Discard patch within 12 hours or as directed by MD, Disp: 15 patch, Rfl: 5    LIDOcaine (SALONPAS, LIDOCAINE,) 4 % PtMd, Apply 1 patch topically once daily., Disp: 10 patch, Rfl: 5    lisinopril (PRINIVIL,ZESTRIL) 40 MG tablet, Take 40 mg by mouth once daily., Disp: , Rfl:     meloxicam (MOBIC) 7.5 MG tablet, Take 7.5 mg by mouth once daily., Disp: , Rfl:     metFORMIN (GLUCOPHAGE) 500 MG tablet, Take 500 mg by mouth 2 (two) times daily., Disp: , Rfl:     multivitamin capsule, Take 1 capsule by mouth once daily., Disp: , Rfl:     mupirocin (BACTROBAN) 2 % ointment, Use qd on wound, Disp: 30 g, Rfl: 2    predniSONE (DELTASONE) 5 MG tablet, Take 2 tablets (10 mg total) by mouth once daily., Disp: 60 tablet, Rfl: 6    vibegron (GEMTESA) 75 mg Tab, Take 1 tablet (75 mg total) by mouth once daily., Disp: 30  tablet, Rfl: 2    diclofenac sodium (VOLTAREN) 1 % Gel, Apply 2 g topically 4 (four) times daily as needed., Disp: 100 g, Rfl: 0    LORazepam (ATIVAN) 0.5 MG tablet, Take 2 tablets (1 mg total) by mouth every evening., Disp: 60 tablet, Rfl: 0    Allergies  Review of patient's allergies indicates:   Allergen Reactions    Darvocet a500 [propoxyphene n-acetaminophen] Swelling    Penicillins Swelling    Percocet [oxycodone-acetaminophen]     Tramadol Nausea And Vomiting       Patient's PMH, FH, Social hx, Medications, allergies reviewed and updated as pertinent to today's visit    Objective:      Physical Exam  Constitutional:       Appearance: She is well-developed.   HENT:      Head: Normocephalic and atraumatic.   Eyes:      Conjunctiva/sclera: Conjunctivae normal.   Pulmonary:      Effort: Pulmonary effort is normal. No respiratory distress.   Abdominal:      General: Abdomen is flat. There is no distension.      Palpations: Abdomen is soft.      Tenderness: There is no right CVA tenderness or left CVA tenderness.   Skin:     General: Skin is warm.      Findings: No rash.   Neurological:      Mental Status: She is alert and oriented to person, place, and time.   Psychiatric:         Behavior: Behavior normal.           Assessment:       1. OAB (overactive bladder)    2. Urinary incontinence, urge        Plan:       OAB/ urge incontinence  Continue gemtesa as she has noted good symptom response with decreased pad usage  Unable to use anticholinergics as patient is elderly with a fall risk   She has HTN, mirabegron should be avoided    90 d rx vs 30 d rx provided  Tier exemption to be applied for

## 2024-08-22 ENCOUNTER — HOSPITAL ENCOUNTER (OUTPATIENT)
Dept: RADIOLOGY | Facility: HOSPITAL | Age: 79
Discharge: HOME | End: 2024-08-22
Attending: STUDENT IN AN ORGANIZED HEALTH CARE EDUCATION/TRAINING PROGRAM
Payer: MEDICARE

## 2024-08-22 ENCOUNTER — TELEPHONE (OUTPATIENT)
Dept: UROLOGY | Facility: CLINIC | Age: 79
End: 2024-08-22
Payer: MEDICARE

## 2024-08-22 DIAGNOSIS — R91.1 NODULE OF RIGHT LUNG: ICD-10-CM

## 2024-08-22 DIAGNOSIS — N32.81 OAB (OVERACTIVE BLADDER): ICD-10-CM

## 2024-08-22 PROCEDURE — 71250 CT THORAX DX C-: CPT | Mod: TC

## 2024-08-22 PROCEDURE — 71250 CT THORAX DX C-: CPT | Mod: 26,,, | Performed by: RADIOLOGY

## 2024-08-22 NOTE — TELEPHONE ENCOUNTER
----- Message from Nancy Gutierrez sent at 8/22/2024  2:58 PM CDT -----  .Type:  Patient Returning Call    Who Called: Self     Who Left Message for Patient: Courtney    Does the patient know what this is regarding?: Yes     Would the patient rather a call back or a response via [facility]? Call Back     Best Call Back Number: [phone]       Additional Information:

## 2024-08-22 NOTE — PROGRESS NOTES
Please alert patient that her lung CT scan does not show concerning findings when compared to 2017, she should still follow up with her PCP for surveillance  Her PCP has been messaged regarding these results as well ( NP Bora)

## 2024-08-22 NOTE — TELEPHONE ENCOUNTER
----- Message from Claritza Garcia MD sent at 8/22/2024 11:23 AM CDT -----  Please alert patient that her lung CT scan does not show concerning findings when compared to 2017, she should still follow up with her PCP for surveillance  Her PCP has been messaged regarding these results as well ( NP Bora)

## 2024-08-26 ENCOUNTER — TELEPHONE (OUTPATIENT)
Dept: ADMINISTRATIVE | Facility: HOSPITAL | Age: 79
End: 2024-08-26
Payer: MEDICARE

## 2024-08-26 NOTE — TELEPHONE ENCOUNTER
----- Message from Allison Berrios sent at 8/26/2024 12:17 PM CDT -----  Regarding: Requesting result  .Type:  Test Results    Who Called: self     Name of Test (Lab/Mammo/Etc): CT Scan     Date of Test: 08/22/2024    Ordering Provider: CHANI Garcia    Where the test was performed: [facility]     Would the patient rather a call back or a response via [facility]?call      Best Call Back Number: [phone]      Additional Information:      For Clinical Team:Has the provider reviewed the results?

## 2024-10-11 ENCOUNTER — TELEPHONE (OUTPATIENT)
Dept: UROLOGY | Facility: CLINIC | Age: 79
End: 2024-10-11
Payer: MEDICARE

## 2024-10-11 NOTE — TELEPHONE ENCOUNTER
Thanks for the message  Patient's proteinuria would be best evaluated by a nephrologist.   I have recently looked inside the patient's bladder without evidence for malignancy, her CT results are non specific.   I am glad to see she has a pelvic floor physical therapy appt to address her non infectious urinary concerns.   All the best

## 2024-10-24 ENCOUNTER — CLINICAL SUPPORT (OUTPATIENT)
Dept: REHABILITATION | Facility: OTHER | Age: 79
End: 2024-10-24
Payer: MEDICARE

## 2024-10-24 DIAGNOSIS — M62.89 PELVIC FLOOR DYSFUNCTION: Primary | ICD-10-CM

## 2024-10-24 DIAGNOSIS — N39.41 URINARY INCONTINENCE, URGE: ICD-10-CM

## 2024-10-24 DIAGNOSIS — R27.8 COORDINATION IMPAIRMENT: ICD-10-CM

## 2024-10-24 DIAGNOSIS — N32.81 OAB (OVERACTIVE BLADDER): ICD-10-CM

## 2024-10-24 PROCEDURE — 97530 THERAPEUTIC ACTIVITIES: CPT | Mod: KX,PN | Performed by: PHYSICAL THERAPIST

## 2024-10-24 PROCEDURE — 97161 PT EVAL LOW COMPLEX 20 MIN: CPT | Mod: KX,PN | Performed by: PHYSICAL THERAPIST

## 2024-10-24 NOTE — PLAN OF CARE
[facility]   Pelvic Health Physical Therapy Initial Evaluation      Date: 10/24/2024   Name: Mariangel Moreau  Clinic Number: [id number]    Therapy Diagnosis:   Encounter Diagnoses   Name Primary?    Pelvic floor dysfunction Yes    Coordination impairment      Referring Provider: Claritza Garcia MD    Referring Provider Orders: PT Eval and Treat  Medical Diagnosis from Referral: OAB (overactive bladder) [N32.81], Urinary incontinence, urge [N39.41]   Evaluation Date: 10/24/2024  Authorization Period Expiration: 2024  Plan of Care Expiration: 2025  Visit # / Visits authorized: 1/pending  FOTO: 1 (10/24/2024)    Precautions: standard    Time In: 10:30  Time Out: 11:15  Total Appointment Time (timed & untimed codes): 45 minutes    SUBJECTIVE     Date of onset: chronic  History of current condition: Mariangel reports urgency and urge urinary incontinence. Stopped taking gemtesa about 2 months ago because it's too expensive. Started taking blood pressure pill in the afternoon and not night. She tries to cut off fluid intake before bed.    OB/GYN HISTORY -  (vaginal deliveries with episiotomy, big babies)    BLADDER HISTORY  Frequency of urination:   Day: 5-8x           Night: 3x  Difficulty initiating urine stream: No  Complete emptying: Doesn't think she's emptying all the way  Post-void dribbling: No  Urinary Urgency: Yes - with urgency (particularly at night)  Bladder leakage: Yes  Frequency of incidents: multiple times per day  Form of protection: pad (changes throughout the night)    BOWEL HISTORY  Difficulty initiating BM: Yes  Quality/shape of BM: Dequincy Stool Chart 1-4  Incomplete emptying? Yes  Colon leakage: No    SEXUAL/PELVIC PAIN HISTORY  Sexually active? No   Vaginal discomfort with sitting  Feeling of pelvic heaviness? No    Pain: no back/hip pain    Imaging: none pertinent to the pelvic floor    Prior Therapy: no prior pelvic floor therapy  Social History: lives in  a house with steps to enter, with family  Current exercise: none  Prior Level of Function: no pelvic floor dysfunction  Current Level of Function: urinary urgency and urge urinary incontinence + dysfunctional defecation    Types of fluid intake: 1-2 cups of coffee daily, 4-6 glasses of water per day, soda sometimes  Habitus: well developed, well nourished  Abuse/Neglect: none reported    Patients goals: improve urine leakage and nocturia     Medical History: Mariangel  has a past medical history of Aortic atherosclerosis, Diabetes mellitus, High cholesterol, Hypertension, and Sarcoidosis.     Surgical History: Mariangel Moreau  has a past surgical history that includes Cholecystectomy; Colonoscopy (N/A, 9/13/2019); and Surgical removal of mass of upper extremity (Right, 3/4/2022).    Medications: Mariangel has a current medication list which includes the following prescription(s): ammonium lactate, aspirin, ciclopirox, diclofenac sodium, ergocalciferol, fluconazole, glipizide, ibuprofen, lidocaine, lidocaine, lisinopril, lorazepam, meloxicam, metformin, multivitamin, mupirocin, prednisone, and gemtesa.    Allergies:   Review of patient's allergies indicates:   Allergen Reactions    Darvocet a500 [propoxyphene n-acetaminophen] Swelling    Penicillins Swelling    Percocet [oxycodone-acetaminophen]     Tramadol Nausea And Vomiting        OBJECTIVE     See Electronic Medical Record under MEDIA for written consent provided 10/24/2024  Chaperone: declined    Hip Strength 10/24/2024   R hip flexion 4/5   R hip external rotation 4-/5   L hip flexion 4/5   L hip external rotation 4-/5     ABDOMINAL WALL ASSESSMENT  Palpation: no tenderness  Pelvic Girdle Stability: Pt demonstrates severely impaired ability to stabilize pelvis with Active Straight Leg Raise Test.   Diastasis Recti: present, 2-finger width, present with supine curl-up task  Motor Control: able to demonstrate appropriate transverse abdominis contraction on  command    VAGINAL PELVIC FLOOR EXAM - external assessment  Introitus: WNL, uterine prolapse visible  Skin condition: WNL  Scarring: none   Sensation: WNL   Pain: none  Voluntary contraction: bulge  Voluntary relaxation: nil  Bearing down: bulge     VAGINAL PELVIC FLOOR EXAM - internal assessment  Pain: none   Sensation: able to localized pressure appropriately   Vaginal vault: stenotic (prolapse)  Muscle Bulk: atrophy   Muscle Power: 2-3/5  Muscle Endurance: <8 seconds  # Reps To Fatigue: 4      Quality of contraction: slow rise, intermittently bulges with attempted contraction  Specificity: WNL   Coordination: WNL   Prolapse check: grade 2, apical    Limitation/Restriction for FOTO Pelvic Floor Surveys    Therapist reviewed FOTO scores for Mariangel Moreau on 10/24/2024  FOTO documents entered into [employer] - see Media section.    Limitation Score:        TREATMENT     Total Treatment time (time-based codes) separate from the evaluation: 15 minutes     Therapeutic activities to improve functional performance for 15 minutes -  [x] Education on pelvic floor anatomy, function, and assessment  [x] Education on bladder irritants  [x] Education on basic constipation management  [x] Education on bowel movement optimization - toilet stool  [x] HEP building/HEP review    PATIENT EDUCATION AND HOME EXERCISES     Education provided: general anatomy/physiology of urinary & bowel system, benefits of treatment, and alternative methods of treatment were discussed with the patient. Additionally, Mariangel was provided education on pt prognosis, PT plan of care, pelvic floor anatomy & function, relationship between hips & PFM, bladder irritants, and etiology of urinary urgency    Written Home Exercises provided: yes  Exercises were reviewed, and Mariangel was able to demonstrate them prior to the end of the session. Mariangel demonstrated good understanding of the education provided. See EMR under 'Patient Instructions' for exercises and  education provided during session.    ASSESSMENT     Mariangel is a 79 y.o. female referred to outpatient physical therapy with a medical diagnosis of OAB (overactive bladder) [N32.81], Urinary incontinence, urge [N39.41] and additional complaints of nocturia and constipation. Symptoms impair the patient's ability to perform ADLs and functional mobility, as well as remain continent.  Pt presents with hip weakness, pelvic organ prolapse, diastasis recti, poor trunk stability, decreased pelvic muscle strength, decreased endurance of the pelvic muscles, increased nocturia, poor coordination of pelvic floor muscles during ADL's leading to urinary or fecal leakage, and dysfunctional defecation.  Symptoms appear consistent with pelvic floor dysfunction, particularly weakness and pelvic organ prolapse that compromise continence.   Pt will benefit from pelvic floor muscle training, bladder habit retraining, constipation management, core/hip strengthening, patient education, and functional retraining    Patient prognosis is good  Mariangel will benefit from skilled outpatient physical therapy to address the deficits stated above and in the chart below, provide patient/family education, and to maximize the patient's level of independence.     Plan of care discussed with patient: yes  Patient's spiritual, cultural and educational needs considered, and the patient is agreeable to the plan of care and goals as stated below:     Anticipated Barriers for therapy: none    Medical Necessity is demonstrated by the following -  History  Co-morbidities and personal factors that may impact the plan of care [] LOW: no personal factors / co-morbidities  [] MODERATE: 1-2 personal factors / co-morbidities  [x] HIGH: 3+ personal factors / co-morbidities    Moderate / High Support Documentation:   Co-morbidities affecting plan of care: Aortic atherosclerosis, Diabetes mellitus, High cholesterol, Hypertension, and Sarcoidosis.    Personal Factors:    no deficits     Examination  Body Structures and Functions, activity limitations and participation restrictions that may impact the plan of care [] LOW: addressing 1-2 elements  [] MODERATE: 3+ elements  [x] HIGH: 4+ elements (please support below)    Moderate / High Support Documentation: hip weakness, pelvic organ prolapse, diastasis recti, poor trunk stability, decreased pelvic muscle strength, decreased endurance of the pelvic muscles, increased nocturia, poor coordination of pelvic floor muscles during ADL's leading to urinary or fecal leakage, and dysfunctional defecation     Clinical Presentation [x] LOW: stable  [] MODERATE: Evolving  [] HIGH: Unstable     Decision Making/ Complexity Score: low       Short Term Goals: 6 weeks   Pt to report >50% improvement in urine leakage   Pt to report >50% improvement in constipation and ease of bowel movements to reduce impact on urinary urgency   Pt to be independent with introductory home exercise program     Long Term Goals: 12 weeks   Pt to report >90% improvement in urine leakage   Pt to report nocturia of no more than 1-2 times per night for improved sleep quality   Pt to demonstrate bilateral hip strength of at least 4+/5 for improved pelvic girdle support with load transfer   Pt to report >90% improvement in constipation and ease of bowel movements to reduce impact on urinary urgency   Pt to be independent with advanced home exercise program      PLAN     Plan of Care certification: 10/24/2024 to 1/24/2025    Outpatient Physical Therapy - 1 time per week for 12 weeks to include the following interventions: Therapeutic Exercise, Manual Therapy, Therapeutic Activity, Neuromuscular Re-education, Electrical Stimulation Unattended, Self-Care, Patient Education      Kassidy Ruiz, PT, DPT  Board-Certified Clinical Specialist in Women's Health Physical Therapy

## 2024-10-24 NOTE — PATIENT INSTRUCTIONS
Pelvic floor squeeze, 2-3 sets of 10 with 5-second hold  - Gently squeeze the pelvic floor (imagine puckering the anus)  *Don't hold your breath  *Can perform lying down or sitting up        Bridging, 2-3 sets of 10  - Gently squeeze the pelvic floor by puckering the anus, then lift and lower the hips  *Don't hold your breath      *Use toilet stool for bowel movements

## 2024-10-31 ENCOUNTER — CLINICAL SUPPORT (OUTPATIENT)
Dept: REHABILITATION | Facility: OTHER | Age: 79
End: 2024-10-31
Payer: MEDICARE

## 2024-10-31 DIAGNOSIS — M62.89 PELVIC FLOOR DYSFUNCTION: Primary | ICD-10-CM

## 2024-10-31 DIAGNOSIS — R27.8 COORDINATION IMPAIRMENT: ICD-10-CM

## 2024-10-31 PROCEDURE — 97530 THERAPEUTIC ACTIVITIES: CPT | Mod: PN | Performed by: PHYSICAL THERAPIST

## 2024-10-31 PROCEDURE — 97112 NEUROMUSCULAR REEDUCATION: CPT | Mod: PN | Performed by: PHYSICAL THERAPIST

## 2024-11-13 ENCOUNTER — OFFICE VISIT (OUTPATIENT)
Dept: OBSTETRICS AND GYNECOLOGY | Facility: CLINIC | Age: 79
End: 2024-11-13
Payer: MEDICARE

## 2024-11-13 VITALS
SYSTOLIC BLOOD PRESSURE: 120 MMHG | WEIGHT: 177.69 LBS | BODY MASS INDEX: 26.24 KG/M2 | DIASTOLIC BLOOD PRESSURE: 70 MMHG

## 2024-11-13 DIAGNOSIS — K59.09 OTHER CONSTIPATION: ICD-10-CM

## 2024-11-13 DIAGNOSIS — N32.81 OVERACTIVE BLADDER: Primary | ICD-10-CM

## 2024-11-13 PROCEDURE — 1101F PT FALLS ASSESS-DOCD LE1/YR: CPT | Mod: CPTII,S$GLB,, | Performed by: OBSTETRICS & GYNECOLOGY

## 2024-11-13 PROCEDURE — 1159F MED LIST DOCD IN RCRD: CPT | Mod: CPTII,S$GLB,, | Performed by: OBSTETRICS & GYNECOLOGY

## 2024-11-13 PROCEDURE — 99999 PR PBB SHADOW E&M-EST. PATIENT-LVL III: CPT | Mod: PBBFAC,,, | Performed by: OBSTETRICS & GYNECOLOGY

## 2024-11-13 PROCEDURE — 3074F SYST BP LT 130 MM HG: CPT | Mod: CPTII,S$GLB,, | Performed by: OBSTETRICS & GYNECOLOGY

## 2024-11-13 PROCEDURE — 3288F FALL RISK ASSESSMENT DOCD: CPT | Mod: CPTII,S$GLB,, | Performed by: OBSTETRICS & GYNECOLOGY

## 2024-11-13 PROCEDURE — 99213 OFFICE O/P EST LOW 20 MIN: CPT | Mod: S$GLB,,, | Performed by: OBSTETRICS & GYNECOLOGY

## 2024-11-13 PROCEDURE — 3078F DIAST BP <80 MM HG: CPT | Mod: CPTII,S$GLB,, | Performed by: OBSTETRICS & GYNECOLOGY

## 2024-11-13 NOTE — PROGRESS NOTES
"  SUBJECTIVE:   79 y.o. female No obstetric history on file.  for concerning for pelvic prolapse    No LMP recorded. Patient is postmenopausal..      Pt has had OAB x 2 years, seeing urology  She is taking medication but not helping, want to see if her prolapse is making symptoms worse  Also believed her uterus is causing constipation. She is having BM twice a week which is normal for her.  But going small pellets at time  Not taking any stool softener  Eventually wants to remove the uterus since she believes this worsening her symptoms  OB History   No obstetric history on file.     Past Medical History:   Diagnosis Date    Aortic atherosclerosis     Diabetes mellitus     High cholesterol     Hypertension     Sarcoidosis      Past Surgical History:   Procedure Laterality Date    CHOLECYSTECTOMY      COLONOSCOPY N/A 9/13/2019    Procedure: COLONOSCOPY;  Surgeon: Miguelito Garcia MD;  Location: [facility] ([address] [place] [address]);  Service: Endoscopy;  Laterality: N/A;  Referral from [facility]  Ht: 5'9"  Wt: 180 lbs BMI: 26.2    SURGICAL REMOVAL OF MASS OF UPPER EXTREMITY Right 3/4/2022    Procedure: EXCISION, MASS, UPPER EXTREMITY;  Surgeon: Miller Saravia MD;  Location: [facility];  Service: General;  Laterality: Right;  CALLED AND SPOKE TO DAUGHTER ON 2/25/2022. DAUGHTER WILL CALL BACK IN 1 HOUR  WITH MOM AND BOOK PRE OP APPOINTMENT. 12:32PM-MOHSEN  RN PREOP ON 3/3/22; COVID NEGATIVE     Social History     Socioeconomic History    Marital status:    Tobacco Use    Smoking status: Former     Types: Cigarettes    Smokeless tobacco: Never   Substance and Sexual Activity    Alcohol use: No    Drug use: Never    Sexual activity: Yes     No family history on file.      Current Outpatient Medications   Medication Sig Dispense Refill    ammonium lactate 12 % Crea Apply 1 application topically once daily. 140 g 5    aspirin (ECOTRIN) 81 MG EC tablet Take 1 tablet (81 mg total) by mouth once daily. 90 tablet 3    ciclopirox " (PENLAC) 8 % Soln Apply topically nightly. 6.6 mL 3    diclofenac sodium (VOLTAREN) 1 % Gel Apply 2 g topically 4 (four) times daily as needed. 100 g 0    ergocalciferol (ERGOCALCIFEROL) 50,000 unit Cap Take 50,000 Units by mouth every 7 days.      fluconazole (DIFLUCAN) 150 MG Tab Take 150 mg by mouth once as needed.      glipiZIDE (GLUCOTROL) 10 MG TR24       ibuprofen (ADVIL,MOTRIN) 600 MG tablet Take 1 tablet (600 mg total) by mouth 3 (three) times daily. 30 tablet 1    LIDOcaine (LIDODERM) 5 % Place 1 patch onto the skin once daily. Remove & Discard patch within 12 hours or as directed by MD 15 patch 5    LIDOcaine (SALONPAS, LIDOCAINE,) 4 % PtMd Apply 1 patch topically once daily. 10 patch 5    lisinopril (PRINIVIL,ZESTRIL) 40 MG tablet Take 40 mg by mouth once daily.      LORazepam (ATIVAN) 0.5 MG tablet Take 2 tablets (1 mg total) by mouth every evening. 60 tablet 0    meloxicam (MOBIC) 7.5 MG tablet Take 7.5 mg by mouth once daily.      metFORMIN (GLUCOPHAGE) 500 MG tablet Take 500 mg by mouth 2 (two) times daily.      multivitamin capsule Take 1 capsule by mouth once daily.      mupirocin (BACTROBAN) 2 % ointment Use qd on wound 30 g 2    predniSONE (DELTASONE) 5 MG tablet Take 2 tablets (10 mg total) by mouth once daily. 60 tablet 6    vibegron (GEMTESA) 75 mg Tab Take 1 tablet (75 mg total) by mouth once daily. 90 tablet 3     No current facility-administered medications for this visit.     Allergies: Darvocet a500 [propoxyphene n-acetaminophen], Penicillins, Percocet [oxycodone-acetaminophen], and Tramadol       ROS  ROS:  GENERAL: Denies weight gain or weight loss. Feeling well overall.   SKIN: Denies rash or lesions.   HEAD: Denies head injury or headache.   NODES: Denies enlarged lymph nodes.   CHEST: Denies chest pain or shortness of breath.   CARDIOVASCULAR: Denies palpitations or left sided chest pain.   ABDOMEN: No abdominal pain, constipation, diarrhea, nausea, vomiting or rectal bleeding.    URINARY: No frequency, dysuria, hematuria, or burning on urination.  REPRODUCTIVE:see HPI  BREASTS: The patient performs breast self-examination and denies pain, lumps, or nipple discharge.   HEMATOLOGIC: No easy bruisability or excessive bleeding.  MUSCULOSKELETAL: Denies joint pain or swelling.   NEUROLOGIC: Denies syncope or weakness.   PSYCHIATRIC: Denies depression, anxiety or mood swings.      OBJECTIVE:   /70   Wt 80.6 kg (177 lb 11.1 oz)   BMI 26.24 kg/m²   The patient appears well, alert, oriented x 3, in no distress.  NECK: negative, no thyromegaly, trachea midline  SKIN: normal, good color, good turgor, and no acne, striae, hirsutism  BREAST EXAM: breasts appear normal, no suspicious masses, no skin or nipple changes or axillary nodes  ABDOMEN: soft, non-tender; bowel sounds normal; no masses,  no organomegaly and no hernias, masses, or hepatosplenomegaly  BLADDER: soft  GENITALIA: normal external genitalia, no erythema, no discharge  URETHRA: normal appearing urethra with no masses, tenderness or lesions and normal urethra, normal urethral meatus  VAGINA: Normal,   CERVIX: no lesions or cervical motion tenderness  UTERUS: retroverted  ADNEXA: no mass, fullness, tenderness      ASSESSMENT:    OAB: advised to f/u with urology  Constipation:  advised metamucil and stool softener daily  Discussed with pt at length that she does not have prolapse, hysterectomy not indicated

## 2024-11-17 NOTE — PROGRESS NOTES
Pelvic Health Physical Therapy   Treatment Note     Name: Mariangel Moreau  Clinic Number: [id number]    Therapy Diagnosis:   Encounter Diagnoses   Name Primary?    Pelvic floor dysfunction Yes    Coordination impairment        Referring Provider: Claritza Garcia MD     Visit Date: 11/21/2024    Referring Provider Orders: PT Eval and Treat  Medical Diagnosis from Referral: OAB (overactive bladder) [N32.81], Urinary incontinence, urge [N39.41]   Evaluation Date: 10/24/2024  Authorization Period Expiration: 12/31/2024  Plan of Care Expiration: 1/24/2025  Visit # / Visits authorized: 3/21  FOTO: 1 (10/24/2024)    Cancelled Visits: -  No Show Visits: -    Time In: 11:17 am   Time Out: 11:49 pm   Total Billable Time: 32 minutes    Precautions: standard    Subjective     Mariangel reports constipation is no longer a problem. States her urinary symptoms are worse at night, as she wakes up every hour per night. Reports she has been trying to take a Benadryl to go to sleep so she doesn't wake up, but she is still finding herself waking up to urinate. Reports her OBGYN told her she does not have prolapse, she is feeling a good knowing that she won't have to have surgery. She has been taking medication again (morning, 1:00pm, 5:00pm) but it is not helping too much at night. States she has not had time to work on her exercises since last session and would like to review them. She has a albarado coming to her house so she needs to leave by 11:45 today.     her usual symptoms. She is considering a hysterectomy given the extent of pelvic organ prolapse.    She was not compliant with home exercise program.  Response to previous treatment: no adverse effect  Functional change: none reported    Pain: none reported    Objective     Mariangel received the following interventions during the treatment session:   (TrA = transverse abdominis, PFM = pelvic floor muscle, sEMG = surface electromyography, RUSI = Rehabilitative Ultrasound Imaging)  Pt  consented to pelvic floor muscle assessment and treatment on 10/24/24. See EMR.    Neuromuscular re-education activities to develop balance, coordination, kinesthetic sense, motor control, proprioception, and posture for 24 minutes -   [x] Upright posture + seated clam with blue band, 2x20  [x] Seated PFM squeeze + pillow squeeze between knees, 2x10 with 5-second hold  [x] Seated PFM squeeze + shoulder extension onto ball on knee (R&L), x10 with 3-second hold  [x] Seated PFM squeeze + glut set, x15 with 5-second hold    Therapeutic activities to improve functional performance for 8 minutes -   [x] Education on double-voiding to aid in complete bladder emptying   [x] Education on urge suppression techniques   [x] Education on pelvic floor anatomy & function  [x] HEP building/HEP review    Home Exercises and Patient Education     Patient Education: progression of plan of care, plan for next session, pelvic floor anatomy and function, urge suppression techniques, double-voiding    Home Exercise Program (10/24/24): ivan  Home Exercise Program Updates (10/31/24): see 'Patient Instructions'  Home Exercise Program Updates (11/21/24): see 'Patient Instructions'    Exercises were reviewed, and Mariangel was able to demonstrate them prior to the end of the session, as needed. Mariangel demonstrated good understanding of the education provided.   See 'Patient Instructions' for exercises/instructions provided.    Assessment     Pt tolerated treatment session well, with good understanding of education on behavior modifications provided. Exercises not progressed this session, as pt did not complete exercises at home and needed review of HEP. Upon exercises, pt reported being able to feel her pelvic floor squeezing this session compared to last session.  Pt required significant verbal cuing to coordinate breath with pelvic floor muscle contraction and movement during exercises. Pt provided printed home exercise program this session.  Pt's functional mobility and ability to perform ADLs still limited by pelvic floor dysfunction. She requires skilled therapy for continued patient education and coordination retraining.      Mariangel is progressing well towards her goals.   Pt prognosis: good    Pt will continue to benefit from skilled outpatient physical therapy to address the deficits listed in the problem list box on initial evaluation, provide pt/family education and to maximize pt's level of independence in the home and community environment.     Pt's spiritual, cultural and educational needs considered and pt agreeable to plan of care and goals.  Anticipated barriers to physical therapy: none      Short Term Goals: 6 weeks   Pt to report >50% improvement in urine leakage - NOT MET  Pt to report >50% improvement in constipation and ease of bowel movements to reduce impact on urinary urgency - MET   Pt to be independent with introductory home exercise program - MET      Long Term Goals: 12 weeks   Pt to report >90% improvement in urine leakage - NOT MET   Pt to report nocturia of no more than 1-2 times per night for improved sleep quality - NOT MET   Pt to demonstrate bilateral hip strength of at least 4+/5 for improved pelvic girdle support with load transfer - NOT MET  Pt to report >90% improvement in constipation and ease of bowel movements to reduce impact on urinary urgency - NOT MET   Pt to be independent with advanced home exercise program - NOT MET      Plan     Continue per Plan of Care    Adelina Landrum, PT, DPT

## 2024-11-21 ENCOUNTER — CLINICAL SUPPORT (OUTPATIENT)
Dept: REHABILITATION | Facility: OTHER | Age: 79
End: 2024-11-21
Payer: MEDICARE

## 2024-11-21 DIAGNOSIS — M62.89 PELVIC FLOOR DYSFUNCTION: Primary | ICD-10-CM

## 2024-11-21 DIAGNOSIS — R27.8 COORDINATION IMPAIRMENT: ICD-10-CM

## 2024-11-21 PROCEDURE — 97530 THERAPEUTIC ACTIVITIES: CPT | Mod: PN

## 2024-11-21 PROCEDURE — 97112 NEUROMUSCULAR REEDUCATION: CPT | Mod: PN

## 2024-11-21 NOTE — PATIENT INSTRUCTIONS
DOUBLE VOIDING - Double voiding is a technique that may assist the bladder to empty more effectively when urine is left in the bladder. It involves passing urine more than once each time that you go to the toilet. This makes sure that the bladder is completely empty.    Here are 3 strategies you can try to fully empty your bladder.  You do not have to do all of these things every single time. Find which ones work best for you.     Check to make sure your pelvic floor is relaxed   Do a body scan - make sure your legs, buttocks, and abdominals are relaxed.  Take a couple deep, slow breaths to encourage your pelvic floor muscles to DROP (ie. Try Diaphragmatic Breathing).  Gently apply pressure over your bladder.  Change your pelvic position: lean forward, rock your pelvis forward and backward, stand up then sit back down.     Wait at least 30 seconds to 1 minute to see if a second urine stream begins.     Do not stop your urine stream or push/strain!

## 2024-12-03 ENCOUNTER — DOCUMENTATION ONLY (OUTPATIENT)
Dept: REHABILITATION | Facility: OTHER | Age: 79
End: 2024-12-03
Payer: MEDICARE

## 2024-12-03 PROBLEM — R27.8 COORDINATION IMPAIRMENT: Status: RESOLVED | Noted: 2024-10-24 | Resolved: 2024-12-02

## 2024-12-03 PROBLEM — M62.89 PELVIC FLOOR DYSFUNCTION: Status: RESOLVED | Noted: 2024-10-24 | Resolved: 2024-12-02

## 2024-12-03 RX ORDER — NITROFURANTOIN 25; 75 MG/1; MG/1
CAPSULE ORAL
Qty: 10 CAPSULE | Refills: 0 | OUTPATIENT
Start: 2024-12-03

## 2024-12-03 NOTE — PROGRESS NOTES
Mariangel Moreau no-showed for pelvic floor physical therapy treatment session appointment on 12/2/2024. Discharged from therapy per attendance policy.    Kassidy Ruiz, PT, DPT

## 2025-02-07 ENCOUNTER — OFFICE VISIT (OUTPATIENT)
Dept: UROLOGY | Facility: CLINIC | Age: 80
End: 2025-02-07
Payer: MEDICARE

## 2025-02-07 DIAGNOSIS — R30.0 DYSURIA: Primary | ICD-10-CM

## 2025-02-07 DIAGNOSIS — N95.8 GENITOURINARY SYNDROME OF MENOPAUSE: ICD-10-CM

## 2025-02-07 DIAGNOSIS — N32.81 OAB (OVERACTIVE BLADDER): ICD-10-CM

## 2025-02-07 LAB
BACTERIA #/AREA URNS HPF: ABNORMAL /HPF
BILIRUB UR QL STRIP: NEGATIVE
CLARITY UR: ABNORMAL
COLOR UR: YELLOW
GLUCOSE UR QL STRIP: NEGATIVE
HGB UR QL STRIP: ABNORMAL
HYALINE CASTS #/AREA URNS LPF: 0 /LPF
KETONES UR QL STRIP: NEGATIVE
LEUKOCYTE ESTERASE UR QL STRIP: ABNORMAL
MICROSCOPIC COMMENT: ABNORMAL
NITRITE UR QL STRIP: NEGATIVE
PH UR STRIP: 6 [PH] (ref 5–8)
PROT UR QL STRIP: ABNORMAL
RBC #/AREA URNS HPF: 50 /HPF (ref 0–4)
SP GR UR STRIP: 1.02 (ref 1–1.03)
URN SPEC COLLECT METH UR: ABNORMAL
UROBILINOGEN UR STRIP-ACNC: NEGATIVE EU/DL
WBC #/AREA URNS HPF: >100 /HPF (ref 0–5)
WBC CLUMPS URNS QL MICRO: ABNORMAL

## 2025-02-07 PROCEDURE — 3288F FALL RISK ASSESSMENT DOCD: CPT | Mod: CPTII,S$GLB,, | Performed by: STUDENT IN AN ORGANIZED HEALTH CARE EDUCATION/TRAINING PROGRAM

## 2025-02-07 PROCEDURE — 1126F AMNT PAIN NOTED NONE PRSNT: CPT | Mod: CPTII,S$GLB,, | Performed by: STUDENT IN AN ORGANIZED HEALTH CARE EDUCATION/TRAINING PROGRAM

## 2025-02-07 PROCEDURE — G2211 COMPLEX E/M VISIT ADD ON: HCPCS | Mod: S$GLB,,, | Performed by: STUDENT IN AN ORGANIZED HEALTH CARE EDUCATION/TRAINING PROGRAM

## 2025-02-07 PROCEDURE — 1101F PT FALLS ASSESS-DOCD LE1/YR: CPT | Mod: CPTII,S$GLB,, | Performed by: STUDENT IN AN ORGANIZED HEALTH CARE EDUCATION/TRAINING PROGRAM

## 2025-02-07 PROCEDURE — 99214 OFFICE O/P EST MOD 30 MIN: CPT | Mod: S$GLB,,, | Performed by: STUDENT IN AN ORGANIZED HEALTH CARE EDUCATION/TRAINING PROGRAM

## 2025-02-07 PROCEDURE — 1159F MED LIST DOCD IN RCRD: CPT | Mod: CPTII,S$GLB,, | Performed by: STUDENT IN AN ORGANIZED HEALTH CARE EDUCATION/TRAINING PROGRAM

## 2025-02-07 PROCEDURE — 81000 URINALYSIS NONAUTO W/SCOPE: CPT | Performed by: STUDENT IN AN ORGANIZED HEALTH CARE EDUCATION/TRAINING PROGRAM

## 2025-02-07 PROCEDURE — 87086 URINE CULTURE/COLONY COUNT: CPT | Performed by: STUDENT IN AN ORGANIZED HEALTH CARE EDUCATION/TRAINING PROGRAM

## 2025-02-07 PROCEDURE — 99999 PR PBB SHADOW E&M-EST. PATIENT-LVL III: CPT | Mod: PBBFAC,,, | Performed by: STUDENT IN AN ORGANIZED HEALTH CARE EDUCATION/TRAINING PROGRAM

## 2025-02-07 RX ORDER — ESTRADIOL 0.1 MG/G
1 CREAM VAGINAL
Qty: 42.5 G | Refills: 3 | Status: SHIPPED | OUTPATIENT
Start: 2025-02-10 | End: 2026-02-10

## 2025-02-07 RX ORDER — VIBEGRON 75 MG/1
75 TABLET, FILM COATED ORAL DAILY
Qty: 90 TABLET | Refills: 3 | Status: SHIPPED | OUTPATIENT
Start: 2025-02-07 | End: 2026-01-29

## 2025-02-07 NOTE — PATIENT INSTRUCTIONS
How do I apply the Estrogen cream?  Estrogen cream comes in a tube with an applicator. The applicator is used to insert the cream into your vagina.  It is best to use at bedtime so that there is less mess with the cream. The packaging will have specific instructions for applying the cream and pictures to help you locate the vagina. General instructions are  as follows:  ? Wash your hands with warm water and soap.  ? Remove the cap from the tube and attach the end of the applicator onto  the tube.  ? Squeeze from the bottom of the tube to get the prescribed amount of cream into the applicator. Use the measurement markings on the  applicator to make sure you have the correct dose.  ? Remove the applicator from the tube.  ? Locate the opening to your vagina.      Insert the applicator into the vagina (like using a tampon), and push on the plunger of the applicator to insert the medication. Then slowly  withdraw the applicator.   ? Clean the applicator with warm water and soap. It is best to use liquid soap as bar soap can leave a film on the applicator.  ? Wash hands with warm water and soap.

## 2025-02-07 NOTE — PROGRESS NOTES
Patient ID: Mariangel Moreau is a 79 y.o. female.    Chief Complaint: FU   Referral: No referring provider defined for this encounter.     HPI- Interval  79 y.o. who presents to the Urology clinic for evaluation of OAB with incontinence. Patient noted improvement with gemtesa but too costly, she stopped taking it. Notes dysuria but prior POCT UA negative. The dysuria is quite frustrating to her QOL enjoyment.       Medically Necessary ROS documented in HPI    HPI  8/14/2024  79 y.o. who presents to the Urology clinic for evaluation of OAB, noted gemtesa is working but it is expensive. Patient denies dysuria, hematuria, signs of UTI. She is down to 1 pad per day for security.      HPI 1/4/20204  78 y.o. who presents to the Urology clinic for evaluation of urinary urge incontinence for past several months. No prior management, that patient can recall. Patient wears depends. Notes that she is not able to make it to the restroom without urine leaking down her legs. No prior  procedures. 2 prior vaginal deliveries. Denies leakage of urine with coughing, sneezing, laughing. She drinks very little fluids. No hx of UTIs. Denies odor to urine, blood.     Past Medical History  Active Ambulatory Problems     Diagnosis Date Noted    Foot pain, bilateral 04/17/2015    Fall     Chest pain     Type 2 diabetes mellitus with complication, without long-term current use of insulin 03/28/2017    Pure hypercholesterolemia 03/28/2017    Sarcoidosis 03/28/2017    Neck pain     Screening for colon cancer 09/13/2019    Arm mass, right 03/04/2022     Resolved Ambulatory Problems     Diagnosis Date Noted    Decreased  strength of left hand 01/03/2024    Decreased range of motion of finger of left hand 01/03/2024    Pelvic floor dysfunction 10/24/2024    Coordination impairment 10/24/2024     Past Medical History:   Diagnosis Date    Aortic atherosclerosis     Diabetes mellitus     High cholesterol     Hypertension          Past Surgical  "History  Past Surgical History:   Procedure Laterality Date    CHOLECYSTECTOMY      COLONOSCOPY N/A 9/13/2019    Procedure: COLONOSCOPY;  Surgeon: Miguelito Garcia MD;  Location: [address]);  Service: Endoscopy;  Laterality: N/A;  Referral from SABRINA Gonzalez, [facility]  Ht: 5'9"  Wt: 180 lbs BMI: 26.2    SURGICAL REMOVAL OF MASS OF UPPER EXTREMITY Right 3/4/2022    Procedure: EXCISION, MASS, UPPER EXTREMITY;  Surgeon: Miller Saravia MD;  Location: [facility] OR;  Service: General;  Laterality: Right;  CALLED AND SPOKE TO DAUGHTER ON 2/25/2022. DAUGHTER WILL CALL BACK IN 1 HOUR  WITH MOM AND BOOK PRE OP APPOINTMENT. 12:32PM-LO  RN PREOP ON 3/3/22; COVID NEGATIVE       Social History       Medications    Current Outpatient Medications:     ammonium lactate 12 % Crea, Apply 1 application topically once daily., Disp: 140 g, Rfl: 5    aspirin (ECOTRIN) 81 MG EC tablet, Take 1 tablet (81 mg total) by mouth once daily., Disp: 90 tablet, Rfl: 3    ciclopirox (PENLAC) 8 % Soln, Apply topically nightly., Disp: 6.6 mL, Rfl: 3    ergocalciferol (ERGOCALCIFEROL) 50,000 unit Cap, Take 50,000 Units by mouth every 7 days., Disp: , Rfl:     fluconazole (DIFLUCAN) 150 MG Tab, Take 150 mg by mouth once as needed., Disp: , Rfl:     glipiZIDE (GLUCOTROL) 10 MG TR24, , Disp: , Rfl:     ibuprofen (ADVIL,MOTRIN) 600 MG tablet, Take 1 tablet (600 mg total) by mouth 3 (three) times daily., Disp: 30 tablet, Rfl: 1    LIDOcaine (LIDODERM) 5 %, Place 1 patch onto the skin once daily. Remove & Discard patch within 12 hours or as directed by MD, Disp: 15 patch, Rfl: 5    LIDOcaine (SALONPAS, LIDOCAINE,) 4 % PtMd, Apply 1 patch topically once daily., Disp: 10 patch, Rfl: 5    lisinopril (PRINIVIL,ZESTRIL) 40 MG tablet, Take 40 mg by mouth once daily., Disp: , Rfl:     meloxicam (MOBIC) 7.5 MG tablet, Take 7.5 mg by mouth once daily., Disp: , Rfl:     metFORMIN (GLUCOPHAGE) 500 MG tablet, Take 500 mg by mouth 2 (two) times daily., Disp: , Rfl:    "  multivitamin capsule, Take 1 capsule by mouth once daily., Disp: , Rfl:     mupirocin (BACTROBAN) 2 % ointment, Use qd on wound, Disp: 30 g, Rfl: 2    predniSONE (DELTASONE) 5 MG tablet, Take 2 tablets (10 mg total) by mouth once daily., Disp: 60 tablet, Rfl: 6    vibegron (GEMTESA) 75 mg Tab, Take 1 tablet (75 mg total) by mouth once daily., Disp: 90 tablet, Rfl: 3    diclofenac sodium (VOLTAREN) 1 % Gel, Apply 2 g topically 4 (four) times daily as needed., Disp: 100 g, Rfl: 0    LORazepam (ATIVAN) 0.5 MG tablet, Take 2 tablets (1 mg total) by mouth every evening., Disp: 60 tablet, Rfl: 0    Allergies  Review of patient's allergies indicates:   Allergen Reactions    Darvocet a500 [propoxyphene n-acetaminophen] Swelling    Penicillins Swelling    Percocet [oxycodone-acetaminophen]     Tramadol Nausea And Vomiting       Patient's PMH, FH, Social hx, Medications, allergies reviewed and updated as pertinent to today's visit    Objective:      Physical Exam  Constitutional:       Appearance: She is well-developed.   HENT:      Head: Normocephalic and atraumatic.   Eyes:      Conjunctiva/sclera: Conjunctivae normal.   Pulmonary:      Effort: Pulmonary effort is normal. No respiratory distress.   Abdominal:      General: There is no distension.      Palpations: Abdomen is soft. There is no mass.      Tenderness: There is no abdominal tenderness. There is no guarding.   Skin:     General: Skin is warm.      Findings: No rash.   Neurological:      Mental Status: She is alert and oriented to person, place, and time.   Psychiatric:         Behavior: Behavior normal.             Assessment:       1. Dysuria    2. Genitourinary syndrome of menopause    3. OAB (overactive bladder)        Plan:         Genitourinary syndrome of menopause/dysuria    -The efficacy of estrogen for  post-menopausal women has been demonstrated in several studies. There appears to be a higher effectiveness rate in topically applied estrogen in the  vagina with an improvement in lactobacillus concentrations, decreased vaginal pH and a decrease in UTI episodes and sensations of dysuria  Rx provided  Urine culture to eval for infection in case of false neg POCT UA     OAB  Gemtesa rx provided with tier exemption form completed  Unable to use anticholinergics as patient is elderly with a fall risk   She has HTN, mirabegron should be avoided      Visit today included increased complexity associated with the care of the episodic problem as above addressed and managing the longitudinal care of the patient due to the serious and/or complex managed problem(s) RTC 3 months .

## 2025-02-09 LAB — BACTERIA UR CULT: NORMAL

## 2025-02-11 ENCOUNTER — TELEPHONE (OUTPATIENT)
Dept: UROLOGY | Facility: CLINIC | Age: 80
End: 2025-02-11
Payer: MEDICARE

## 2025-02-11 NOTE — TELEPHONE ENCOUNTER
----- Message from Madyson sent at 2/11/2025  9:48 AM CST -----  Regarding: missed call  Contact: Pt @[phone]  Pt is returning a missed call from someone in the office and is asking for a return call back soon. Thanks.

## 2025-02-20 ENCOUNTER — TELEPHONE (OUTPATIENT)
Dept: UROLOGY | Facility: CLINIC | Age: 80
End: 2025-02-20
Payer: MEDICARE

## 2025-02-20 NOTE — TELEPHONE ENCOUNTER
Pt was contacted informed her of which pharmacy her medication is at.  ----- Message from Nadine sent at 2/20/2025  9:34 AM CST -----  Regarding: Approved for GEMTESA-Medication  Type: Patient Call BackWho called: PatientWhat is the request in detail: Patient is calling to inform Dr. Garcia that she's been approved for vibegron (GEMTESA) 75 mg Tab. Please call pt to let her know which pharmacy the medication will be called in to. Would the patient rather a call back or a response via [facility]? Call [facility] call back number: [phone] Nadine nagy.[facility] Con[facility]ierFax: [phone]

## 2025-06-17 ENCOUNTER — TELEPHONE (OUTPATIENT)
Dept: UROLOGY | Facility: CLINIC | Age: 80
End: 2025-06-17
Payer: MEDICARE

## 2025-06-17 NOTE — TELEPHONE ENCOUNTER
----- Message from Melissa sent at 6/17/2025  1:46 PM CDT -----  6/17/25      Hello,      Pt called to advise that her symptoms are not better, and she would like more testing and a same day appt if possible.  Please call her at [phone]          Thanks        Melissa        CC

## 2025-06-18 ENCOUNTER — NURSE TRIAGE (OUTPATIENT)
Dept: ADMINISTRATIVE | Facility: CLINIC | Age: 80
End: 2025-06-18
Payer: MEDICARE

## 2025-06-18 ENCOUNTER — OCHSNER VIRTUAL EMERGENCY DEPARTMENT (OUTPATIENT)
Facility: CLINIC | Age: 80
End: 2025-06-18
Payer: MEDICARE

## 2025-06-18 ENCOUNTER — TELEPHONE (OUTPATIENT)
Dept: UROLOGY | Facility: CLINIC | Age: 80
End: 2025-06-18
Payer: MEDICARE

## 2025-06-18 ENCOUNTER — PATIENT OUTREACH (OUTPATIENT)
Facility: OTHER | Age: 80
End: 2025-06-18
Payer: MEDICARE

## 2025-06-18 NOTE — PROGRESS NOTES
"Patient spoke to [facility] RN on call nurse to report the following, "Pt called Md office. Md Office tried to reach pt but no answer and unable to leave a voice message. Pt is calling back. Pt has pain in perineal area. She is taking medication for over extended bladder. Pt stated there is an urgency to urinate that causes pain. Pt stated then it will flow. Its painful to sit down and walk. Pt stated she has urinated a full flow within last 4 hours. Side pain."    [facility] RN on call nurse consulted with Dilshad MD on call, Dr. Minnie Chavis, and disposition is Urgent Care. Follow up scheduled on 06/19/2025 to outreach to assess for any additional needs/concerns.     "

## 2025-06-18 NOTE — TELEPHONE ENCOUNTER
----- Message from Melissa sent at 6/18/2025 12:43 PM CDT -----  Contact: PT  6/18/25      Hello,      Patient called back requesting a call at .[phone] (home)           Thanks        Melissa COLON

## 2025-06-18 NOTE — TELEPHONE ENCOUNTER
Pt called Md office. Md Office tried to reach pt but no answer and unable to leave a voice message. Pt is calling back. Pt has pain in perineal area. She is taking medication for over extended bladder. Pt stated there is an urgency to urinate that causes pain. Pt stated then it will flow. Its painful to sit down and walk. Pt stated she has urinated a full flow within last 4 hours. Side pain. Care advice recommends pt see MD today. Dilshad provider Dr Chavis notified and stated for pt to see Urology today.No appts were available. Pt will go to .   Reason for Disposition   Side (flank) or lower back pain present    Additional Information   Negative: Shock suspected (e.g., cold/pale/clammy skin, too weak to stand, low BP, rapid pulse)   Negative: Sounds like a life-threatening emergency to the triager   Negative: Unable to urinate (or only a few drops) > 4 hours and bladder feels very full (e.g., palpable bladder or strong urge to urinate)   Negative: Decreased urination and drinking very little and dehydration suspected (e.g., dark urine, no urine > 12 hours, very dry mouth, very lightheaded)   Negative: Patient sounds very sick or weak to the triager   Negative: Fever > 100.4 F  (38.0 C)    Protocols used: Urinary Symptoms-A-[place]

## 2025-06-18 NOTE — PLAN OF CARE-OVED
[facility] Emergency Department Plan of Care Note  Referral Source: Nurse On-Call                               Chief Complaint   Patient presents with    Urinary Urgency     Pain in perineum and urinary urgency. Emptying bladder. Wants to see urology. Appt scheduled 6/20, unable to override.        Recommendation: Urgent Care                       Recommendation comment: For UA, though symptoms seem chronic and not related to infection.    No diagnosis found.

## 2025-06-19 ENCOUNTER — OFFICE VISIT (OUTPATIENT)
Dept: UROLOGY | Facility: CLINIC | Age: 80
End: 2025-06-19
Payer: MEDICARE

## 2025-06-19 ENCOUNTER — PATIENT OUTREACH (OUTPATIENT)
Facility: OTHER | Age: 80
End: 2025-06-19
Payer: MEDICARE

## 2025-06-19 VITALS — WEIGHT: 177.69 LBS | BODY MASS INDEX: 26.24 KG/M2

## 2025-06-19 DIAGNOSIS — N32.81 OAB (OVERACTIVE BLADDER): Primary | ICD-10-CM

## 2025-06-19 PROCEDURE — 99999 PR PBB SHADOW E&M-EST. PATIENT-LVL III: CPT | Mod: PBBFAC,,, | Performed by: STUDENT IN AN ORGANIZED HEALTH CARE EDUCATION/TRAINING PROGRAM

## 2025-06-19 PROCEDURE — 1159F MED LIST DOCD IN RCRD: CPT | Mod: CPTII,S$GLB,, | Performed by: STUDENT IN AN ORGANIZED HEALTH CARE EDUCATION/TRAINING PROGRAM

## 2025-06-19 PROCEDURE — 1101F PT FALLS ASSESS-DOCD LE1/YR: CPT | Mod: CPTII,S$GLB,, | Performed by: STUDENT IN AN ORGANIZED HEALTH CARE EDUCATION/TRAINING PROGRAM

## 2025-06-19 PROCEDURE — 99214 OFFICE O/P EST MOD 30 MIN: CPT | Mod: S$GLB,,, | Performed by: STUDENT IN AN ORGANIZED HEALTH CARE EDUCATION/TRAINING PROGRAM

## 2025-06-19 PROCEDURE — 3288F FALL RISK ASSESSMENT DOCD: CPT | Mod: CPTII,S$GLB,, | Performed by: STUDENT IN AN ORGANIZED HEALTH CARE EDUCATION/TRAINING PROGRAM

## 2025-06-19 PROCEDURE — G2211 COMPLEX E/M VISIT ADD ON: HCPCS | Mod: S$GLB,,, | Performed by: STUDENT IN AN ORGANIZED HEALTH CARE EDUCATION/TRAINING PROGRAM

## 2025-06-19 PROCEDURE — 1126F AMNT PAIN NOTED NONE PRSNT: CPT | Mod: CPTII,S$GLB,, | Performed by: STUDENT IN AN ORGANIZED HEALTH CARE EDUCATION/TRAINING PROGRAM

## 2025-06-19 PROCEDURE — 1160F RVW MEDS BY RX/DR IN RCRD: CPT | Mod: CPTII,S$GLB,, | Performed by: STUDENT IN AN ORGANIZED HEALTH CARE EDUCATION/TRAINING PROGRAM

## 2025-06-19 NOTE — PROGRESS NOTES
Patient ID: Mariangel Moreau is a 79 y.o. female.    Chief Complaint: FU   Referral: No referring provider defined for this encounter.     HPI- Interval  79 y.o. who presents to the Urology clinic for evaluation of abdominal pain and urge to void since starting gemtesa, has only taken medication about 30 days, previously noted cost issues. She has been taking iron which has caused constipation, she has been taking metamucil. She drinks 3-4 bottles of water daily. Denies excess caffeine intake. Patient accompanied by daughter who provides additional history. Notes patient is usually bent over in pain from her lower abdomen.     Medically Necessary ROS documented in [facility]    HPI-  2/7/2025  79 y.o. who presents to the Urology clinic for evaluation of OAB with incontinence. Patient noted improvement with gemtesa but too costly, she stopped taking it. Notes dysuria but prior POCT UA negative. The dysuria is quite frustrating to her QOL enjoyment.         Medically Necessary ROS documented in [facility]  8/14/2024  79 y.o. who presents to the Urology clinic for evaluation of OAB, noted gemtesa is working but it is expensive. Patient denies dysuria, hematuria, signs of UTI. She is down to 1 pad per day for security.      [facility] 1/4/20204  78 y.o. who presents to the Urology clinic for evaluation of urinary urge incontinence for past several months. No prior management, that patient can recall. Patient wears depends. Notes that she is not able to make it to the restroom without urine leaking down her legs. No prior  procedures. 2 prior vaginal deliveries. Denies leakage of urine with coughing, sneezing, laughing. She drinks very little fluids. No hx of UTIs. Denies odor to urine, blood.     Past Medical History  Active Ambulatory Problems     Diagnosis Date Noted    Foot pain, bilateral 04/17/2015    Fall     Chest pain     Type 2 diabetes mellitus with complication, without long-term current use of insulin 03/28/2017    Pure  "hypercholesterolemia 03/28/2017    Sarcoidosis 03/28/2017    Neck pain     Screening for colon cancer 09/13/2019    Arm mass, right 03/04/2022     Resolved Ambulatory Problems     Diagnosis Date Noted    Decreased  strength of left hand 01/03/2024    Decreased range of motion of finger of left hand 01/03/2024    Pelvic floor dysfunction 10/24/2024    Coordination impairment 10/24/2024     Past Medical History:   Diagnosis Date    Aortic atherosclerosis     Diabetes mellitus     High cholesterol     Hypertension          Past Surgical History  Past Surgical History:   Procedure Laterality Date    CHOLECYSTECTOMY      COLONOSCOPY N/A 9/13/2019    Procedure: COLONOSCOPY;  Surgeon: Miguelito Garcia MD;  Location: [facility] ENDO (4TH FLR);  Service: Endoscopy;  Laterality: N/A;  Referral from SABRINA Gonzalez, [facility]  Ht: 5'9"  Wt: 180 lbs BMI: 26.2    SURGICAL REMOVAL OF MASS OF UPPER EXTREMITY Right 3/4/2022    Procedure: EXCISION, MASS, UPPER EXTREMITY;  Surgeon: Miller Saravia MD;  Location: [facility];  Service: General;  Laterality: Right;  CALLED AND SPOKE TO DAUGHTER ON 2/25/2022. DAUGHTER WILL CALL BACK IN 1 HOUR  WITH MOM AND BOOK PRE OP APPOINTMENT. 12:32PM-LO  RN PREOP ON 3/3/22; COVID NEGATIVE       Social History       Medications  Current Medications[1]    Allergies  Review of patient's allergies indicates:   Allergen Reactions    Darvocet a500 [propoxyphene n-acetaminophen] Swelling    Penicillins Swelling    Percocet [oxycodone-acetaminophen]     Tramadol Nausea And Vomiting       Patient's PMH, FH, Social hx, Medications, allergies reviewed and updated as pertinent to today's visit    Objective:      Physical Exam  Constitutional:       Appearance: She is well-developed.   HENT:      Head: Normocephalic and atraumatic.   Eyes:      Conjunctiva/sclera: Conjunctivae normal.   Pulmonary:      Effort: Pulmonary effort is normal. No respiratory distress.   Abdominal:      General: There is no distension.      " Palpations: Abdomen is soft.      Tenderness: There is no abdominal tenderness.   Skin:     General: Skin is warm.      Findings: No rash.   Neurological:      Mental Status: She is alert and oriented to person, place, and time.   Psychiatric:         Behavior: Behavior normal.             Assessment:       1. OAB (overactive bladder)        Plan:       OAB  Chronic condition not at treatment goal  Avoid constipation, discussed iron/ metamucil can cause abdominal pain as patient's body gets used to the new regimen  PVR 23 cc, no retention  OAB exercises provided  Stop gemtesa for now    FU in 8 weeks  Discussed if conservative management fails can consider SNM vs Botox procedures    Visit today included increased complexity associated with the care of the episodic problem as above  addressed and managing the longitudinal care of the patient due to the serious and/or complex managed problem(s) as above .           [1]   Current Outpatient Medications:     ammonium lactate 12 % Crea, Apply 1 application topically once daily., Disp: 140 g, Rfl: 5    aspirin (ECOTRIN) 81 MG EC tablet, Take 1 tablet (81 mg total) by mouth once daily., Disp: 90 tablet, Rfl: 3    ciclopirox (PENLAC) 8 % Soln, Apply topically nightly., Disp: 6.6 mL, Rfl: 3    diclofenac sodium (VOLTAREN) 1 % Gel, Apply 2 g topically 4 (four) times daily as needed., Disp: 100 g, Rfl: 0    ergocalciferol (ERGOCALCIFEROL) 50,000 unit Cap, Take 50,000 Units by mouth every 7 days., Disp: , Rfl:     estradioL (ESTRACE) 0.01 % (0.1 mg/gram) vaginal cream, Place 1 g vaginally twice a week., Disp: 42.5 g, Rfl: 3    fluconazole (DIFLUCAN) 150 MG Tab, Take 150 mg by mouth once as needed., Disp: , Rfl:     glipiZIDE (GLUCOTROL) 10 MG TR24, , Disp: , Rfl:     ibuprofen (ADVIL,MOTRIN) 600 MG tablet, Take 1 tablet (600 mg total) by mouth 3 (three) times daily., Disp: 30 tablet, Rfl: 1    LIDOcaine (LIDODERM) 5 %, Place 1 patch onto the skin once daily. Remove & Discard  patch within 12 hours or as directed by MD, Disp: 15 patch, Rfl: 5    LIDOcaine (SALONPAS, LIDOCAINE,) 4 % PtMd, Apply 1 patch topically once daily., Disp: 10 patch, Rfl: 5    lisinopril (PRINIVIL,ZESTRIL) 40 MG tablet, Take 40 mg by mouth once daily., Disp: , Rfl:     LORazepam (ATIVAN) 0.5 MG tablet, Take 2 tablets (1 mg total) by mouth every evening., Disp: 60 tablet, Rfl: 0    meloxicam (MOBIC) 7.5 MG tablet, Take 7.5 mg by mouth once daily., Disp: , Rfl:     metFORMIN (GLUCOPHAGE) 500 MG tablet, Take 500 mg by mouth 2 (two) times daily., Disp: , Rfl:     multivitamin capsule, Take 1 capsule by mouth once daily., Disp: , Rfl:     mupirocin (BACTROBAN) 2 % ointment, Use qd on wound, Disp: 30 g, Rfl: 2    predniSONE (DELTASONE) 5 MG tablet, Take 2 tablets (10 mg total) by mouth once daily., Disp: 60 tablet, Rfl: 6    vibegron (GEMTESA) 75 mg Tab, Take 1 tablet (75 mg total) by mouth once daily., Disp: 90 tablet, Rfl: 3

## 2025-06-19 NOTE — PROGRESS NOTES
Spoke with pt for a follow-up today, as she had spoke with OOC yesterday and consulted Dilshad. Pt expressed she is doing better and attended urology appointment today. Pt did not have any needs. Pt was reminded she can give a call back if she may end up needing something. This is encounter will be closed.    Aar Wang  ED Navigator  [phone]

## (undated) DEVICE — SUT STRATAFIX 4-0 30CM PS-2

## (undated) DEVICE — CANISTER SUCTION 2 LTR

## (undated) DEVICE — ELECTRODE REM PLYHSV RETURN 9

## (undated) DEVICE — Device

## (undated) DEVICE — SUT VICRYL PLUS 3-0 SH 18IN

## (undated) DEVICE — SOL 9P NACL IRR PIC IL

## (undated) DEVICE — SEE MEDLINE ITEM 146292

## (undated) DEVICE — GLOVE BIOGEL 7.5

## (undated) DEVICE — APPLICATOR CHLORAPREP ORN 26ML

## (undated) DEVICE — NDL HYPO REG 25G X 1 1/2

## (undated) DEVICE — SYS CLSR DERMABOND PRINEO 22CM

## (undated) DEVICE — COVER OVERHEAD SURG LT BLUE

## (undated) DEVICE — SYR 10CC LUER LOCK

## (undated) DEVICE — SHEET THYROID W/ISO-BAC

## (undated) DEVICE — SEE MEDLINE ITEM 107746

## (undated) DEVICE — SEE MEDLINE ITEM 146298